# Patient Record
Sex: FEMALE | Race: BLACK OR AFRICAN AMERICAN | NOT HISPANIC OR LATINO | Employment: UNEMPLOYED | URBAN - METROPOLITAN AREA
[De-identification: names, ages, dates, MRNs, and addresses within clinical notes are randomized per-mention and may not be internally consistent; named-entity substitution may affect disease eponyms.]

---

## 2017-11-11 ENCOUNTER — HOSPITAL ENCOUNTER (EMERGENCY)
Facility: CLINIC | Age: 22
Discharge: HOME | End: 2017-11-11
Attending: EMERGENCY MEDICINE | Admitting: EMERGENCY MEDICINE

## 2017-11-11 VITALS
DIASTOLIC BLOOD PRESSURE: 57 MMHG | SYSTOLIC BLOOD PRESSURE: 115 MMHG | TEMPERATURE: 98.3 F | BODY MASS INDEX: 23.32 KG/M2 | HEIGHT: 65 IN | RESPIRATION RATE: 16 BRPM | WEIGHT: 140 LBS | HEART RATE: 71 BPM | OXYGEN SATURATION: 97 %

## 2017-11-11 DIAGNOSIS — R19.7 DIARRHEA, UNSPECIFIED TYPE: ICD-10-CM

## 2017-11-11 DIAGNOSIS — L73.1 INGROWN HAIR: ICD-10-CM

## 2017-11-11 PROCEDURE — 99283 EMERGENCY DEPT VISIT LOW MDM: CPT | Mod: 25

## 2017-11-11 PROCEDURE — 10060 I&D ABSCESS SIMPLE/SINGLE: CPT

## 2017-11-11 RX ORDER — LOPERAMIDE HYDROCHLORIDE 2 MG/1
2 TABLET ORAL 4 TIMES DAILY PRN
Qty: 8 TABLET | Refills: 0 | Status: SHIPPED | OUTPATIENT
Start: 2017-11-11 | End: 2020-10-06

## 2017-11-11 NOTE — ED AVS SNAPSHOT
Emergency Department    [address] [place] [address]    Phone:  [phone]    Fax:  [phone]                                       Hansa Ortiz   MRN: [id number]    Department:   Emergency Department   Date of Visit:  11/11/2017           After Visit Summary Signature Page     I have received my discharge instructions, and my questions have been answered. I have discussed any challenges I see with this plan with the nurse or doctor.    ..........................................................................................................................................  Patient/Patient Representative Signature      ..........................................................................................................................................  Patient Representative Print Name and Relationship to Patient    ..................................................               ................................................  Date                                            Time    ..........................................................................................................................................  Reviewed by Signature/Title    ...................................................              ..............................................  Date                                                            Time

## 2017-11-11 NOTE — ED PROVIDER NOTES
"  History     Chief Complaint:  \"I think I have an ingrown hair\"       HPI   Hansa Ortiz is a 22 year old female who presents for evaluation of the painful wound to her right groin. She denies a \"ingrown hair\" that is become painful with a small surrounding rash over the past 3 days. No prior similar problems. She is not diabetic and has had no fevers or purulent drainage, though she and her boyfriend had been picking at the area to see if it will drain. All they could get out was a scant amount of blood. She has no urinary symptoms and no symptoms down her leg.  She denies any possibility of pregnancy. She is single and reports concern for several loose nonbloody stools today, in the absence of recent antibiotic use or abdominal pain.    Allergies:    NKDA      Medications:    No regular medications      Past Medical History:    No past medical history on file.    There are no active problems to display for this patient.       Past Surgical History:    No past surgical history on file.     Family History:    family history is not on file.    Social History:    works at a local nursing home.      Review of Systems   All other systems reviewed and are negative.    Physical Exam     Patient Vitals for the past 24 hrs:   BP Temp Temp src Pulse Resp SpO2 Height Weight   11/11/17 1442 115/57 98.3  F (36.8  C) Oral 71 16 97 % 1.651 m (5' 5\") 63.5 kg (140 lb)        Physical Exam  General: nontoxic appearing woman sitting upright in room 27  HENT: mucous membranes moist   CV: regular rate, normal right femoral pulse is palpable and is distinct from the inguinal wound.  Resp: clear throughout, normal effort, no crackles or wheezing  GI: abdomen soft, nontender, normal bowel sounds  MSK: no bony tenderness, no CVAT  Skin: approximately 1 cm area of slight erythema at the right lateral edge of her pubic hair in the inguinal crease with a miniscule central opening through which only scant amount of blood can be drained.  No " fluctuance or purulence. No streaking erythema nor crepitus.  Female ED nurse Sheryl present as chaperone throughout the entire exam and procedure.  Neuro: alert, clear speech, oriented   Psych: normal mood and affect    Emergency Department Course   Procedures:  Procedure: Incision and Drainage   Performed by: Kevin Nunez MD    LOCATION:  R inguinal crease      ANESTHESIA:  Local field block using Marcaine 0.5% with epinephrine, total of 3 mLs    PREPARATION:  Cleansed with Betadine    PROCEDURE:  Area was incised with # 11 Blade (Sharp Point) with a Single Straight incision.  Wound treatment included removal of an embedded ingrown pubic hair, without purulence.  Wound gently bluntly explored.  Scant bleeding <1cc total.  No packing.  Appropriate dressing was applied to cover the area.    Patient Status:  Patient tolerated the procedure moderately well. There were no complications evident      Emergency Department Course:  Past medical records, nursing notes, and vitals reviewed.  I performed an exam of the patient and obtained history, as documented above.    Procedure performed as above.    I rechecked the patient. Findings and plan explained to the Patient. Patient was discharged home in improved condition.    Impression & Plan    Medical Decision Making:  She has some localized tenderness and findings on exam and procedure consistent with an ingrown hair. It is no convincing serious infection around this and I do not think she requires antibiotics. There is nothing to culture. Wound care was discussed. Regarding her diarrhea, I suspect a benign cause and there is no indication for emergent workup or immediate treatment, though I did discuss potential use of over-the-counter medications.  Return for acute worsening and otherwise follow up soon through clinic.      Diagnosis:    ICD-10-CM    1. Ingrown hair L73.1    2. Diarrhea, unspecified type R19.7         Discharge Medications:  Imodium          11/11/2017   No att. providers found        Kevin Nunez MD  11/11/17 1094

## 2017-11-11 NOTE — ED AVS SNAPSHOT
Emergency Department    [address] [facility] [address]    Phone:  [phone]    Fax:  [phone]                                       Hansa Ortiz   MRN: [id number]    Department:   Emergency Department   Date of Visit:  11/11/2017           Patient Information     Date Of Birth          1995        Your diagnoses for this visit were:     Ingrown hair     Diarrhea, unspecified type        You were seen by Kevin Nunez MD.      Follow-up Information     Follow up with [facility]. Schedule an appointment as soon as possible for a visit in 2 days.    Specialties:  Podiatry, Internal Medicine, Family Medicine    Why:  As needed    Contact information:    [address] [address] [phone]        Follow up with  Emergency Department.    Specialty:  EMERGENCY MEDICINE    Why:  As needed, If symptoms worsen    Contact information:    [address] [facility] [address] [phone]        Discharge Instructions       Keep your groin wound clean and dry for the next few days, though it is ok to bathe as you normally would.  No antibiotics are necessary at this time.  Regarding your diarrhea, it would be reasonable to take Imodium or Pepto Bismol (both available over the counter).  Be sure to drink plenty of fluids to stay well hydrated.    Discharge References/Attachments     DIARRHEA, TREATING (ENGLISH)      24 Hour Appointment Hotline       To make an appointment at any [facility], call [phone] ([phone]). If you don't have a family doctor or clinic, we will help you find one. [facility] are conveniently located to serve the needs of you and your family.             Review of your medicines      Notice     You have not been prescribed any medications.            Orders Needing Specimen Collection     None      Pending Results     No orders found from 11/9/2017 to 11/12/2017.             Pending Culture Results     No orders found from 11/9/2017 to 11/12/2017.            Pending Results Instructions     If you had any lab results that were not finalized at the time of your Discharge, you can call the ED Lab Result RN at [phone]. You will be contacted by this team for any positive Lab results or changes in treatment. The nurses are available 7 days a week from 10A to 6:30P.  You can leave a message 24 hours per day and they will return your call.        Test Results From Your Hospital Stay               Clinical Quality Measure: Blood Pressure Screening     Your blood pressure was checked while you were in the emergency department today. The last reading we obtained was  BP: 115/57 . Please read the guidelines below about what these numbers mean and what you should do about them.  If your systolic blood pressure (the top number) is less than 120 and your diastolic blood pressure (the bottom number) is less than 80, then your blood pressure is normal. There is nothing more that you need to do about it.  If your systolic blood pressure (the top number) is 120-139 or your diastolic blood pressure (the bottom number) is 80-89, your blood pressure may be higher than it should be. You should have your blood pressure rechecked within a year by a primary care provider.  If your systolic blood pressure (the top number) is 140 or greater or your diastolic blood pressure (the bottom number) is 90 or greater, you may have high blood pressure. High blood pressure is treatable, but if left untreated over time it can put you at risk for heart attack, stroke, or kidney failure. You should have your blood pressure rechecked by a primary care provider within the next 4 weeks.  If your provider in the emergency department today gave you specific instructions to follow-up with your doctor or provider even sooner than that, you should follow that instruction and not wait for up to 4 weeks for your follow-up  "visit.        Thank you for choosing [facility]       Thank you for choosing [facility] for your care. Our goal is always to provide you with excellent care. Hearing back from our patients is one way we can continue to improve our services. Please take a few minutes to complete the written survey that you may receive in the mail after you visit with us. Thank you!        [employer] Information     "[employer] lets you send messages to your doctor, view your test results, renew your prescriptions, schedule appointments and more. To sign up, go to [email or web address] . Click on \"Log in\" on the left side of the screen, which will take you to the Welcome page. Then click on \"Sign up Now\" on the right side of the page.     You will be asked to enter the access code listed below, as well as some personal information. Please follow the directions to create your username and password.     Your access code is: [email or web address]  Expires: 2018  3:41 PM     Your access code will  in 90 days. If you need help or a new code, please call your [facility] clinic or [phone].        Care EveryWhere ID     This is your Care EveryWhere ID. This could be used by other organizations to access your [facility] medical records  [id number]        Equal Access to Services     DAWNA BROWN : Hadii denny asencioo Soeva, waaxda luqadaha, qaybta kaalmada adeegyada, francesca manning. So [facility] [phone].    ATENCIÓN: Si habla español, tiene a mims disposición servicios gratuitos de asistencia lingüística. Llame al [phone].    We comply with applicable federal civil rights laws and [place] laws. We do not discriminate on the basis of race, color, national origin, age, disability, sex, sexual orientation, or gender identity.            After Visit Summary       This is your record. Keep this with you and show to your community pharmacist(s) and doctor(s) at your next visit.                  "

## 2017-11-11 NOTE — LETTER
November 11, 2017      To Whom It May Concern:      Hansa Ortiz was seen in our Emergency Department today, 11/11/17.  I expect her condition to improve over the next 2 days.  She may return to work when improved.        Sincerely,        Kevin Nunez MD

## 2020-01-14 LAB
HPV ABSTRACT: NORMAL
PAP-ABSTRACT: NORMAL
PAP: NORMAL

## 2020-05-09 ENCOUNTER — APPOINTMENT (OUTPATIENT)
Dept: ULTRASOUND IMAGING | Facility: CLINIC | Age: 25
End: 2020-05-09
Attending: PHYSICIAN ASSISTANT
Payer: COMMERCIAL

## 2020-05-09 ENCOUNTER — HOSPITAL ENCOUNTER (EMERGENCY)
Facility: CLINIC | Age: 25
Discharge: HOME | End: 2020-05-09
Attending: PHYSICIAN ASSISTANT | Admitting: PHYSICIAN ASSISTANT
Payer: COMMERCIAL

## 2020-05-09 VITALS
SYSTOLIC BLOOD PRESSURE: 104 MMHG | RESPIRATION RATE: 12 BRPM | OXYGEN SATURATION: 100 % | TEMPERATURE: 97.6 F | DIASTOLIC BLOOD PRESSURE: 80 MMHG | HEART RATE: 59 BPM

## 2020-05-09 DIAGNOSIS — R11.2 NAUSEA VOMITING AND DIARRHEA: ICD-10-CM

## 2020-05-09 DIAGNOSIS — R10.11 RUQ ABDOMINAL PAIN: ICD-10-CM

## 2020-05-09 DIAGNOSIS — R19.7 NAUSEA VOMITING AND DIARRHEA: ICD-10-CM

## 2020-05-09 LAB
ALBUMIN SERPL-MCNC: 4 G/DL (ref 3.4–5)
ALBUMIN UR-MCNC: 20 MG/DL
ALP SERPL-CCNC: 49 U/L (ref 40–150)
ALT SERPL W P-5'-P-CCNC: 45 U/L (ref 0–50)
AMORPH CRY #/AREA URNS HPF: ABNORMAL /HPF
ANION GAP SERPL CALCULATED.3IONS-SCNC: 7 MMOL/L (ref 3–14)
APPEARANCE UR: ABNORMAL
AST SERPL W P-5'-P-CCNC: 29 U/L (ref 0–45)
B-HCG FREE SERPL-ACNC: <5 IU/L
BASOPHILS # BLD AUTO: 0 10E9/L (ref 0–0.2)
BASOPHILS NFR BLD AUTO: 0.4 %
BILIRUB SERPL-MCNC: 0.6 MG/DL (ref 0.2–1.3)
BILIRUB UR QL STRIP: NEGATIVE
BUN SERPL-MCNC: 13 MG/DL (ref 7–30)
CALCIUM SERPL-MCNC: 8.4 MG/DL (ref 8.5–10.1)
CHLORIDE SERPL-SCNC: 109 MMOL/L (ref 94–109)
CO2 SERPL-SCNC: 23 MMOL/L (ref 20–32)
COLOR UR AUTO: YELLOW
CREAT SERPL-MCNC: 0.59 MG/DL (ref 0.52–1.04)
DIFFERENTIAL METHOD BLD: NORMAL
EOSINOPHIL # BLD AUTO: 0 10E9/L (ref 0–0.7)
EOSINOPHIL NFR BLD AUTO: 0.1 %
ERYTHROCYTE [DISTWIDTH] IN BLOOD BY AUTOMATED COUNT: 12.7 % (ref 10–15)
GFR SERPL CREATININE-BSD FRML MDRD: >90 ML/MIN/{1.73_M2}
GLUCOSE SERPL-MCNC: 123 MG/DL (ref 70–99)
GLUCOSE UR STRIP-MCNC: NEGATIVE MG/DL
HCT VFR BLD AUTO: 37.6 % (ref 35–47)
HGB BLD-MCNC: 12.2 G/DL (ref 11.7–15.7)
HGB UR QL STRIP: ABNORMAL
IMM GRANULOCYTES # BLD: 0.1 10E9/L (ref 0–0.4)
IMM GRANULOCYTES NFR BLD: 1.1 %
KETONES UR STRIP-MCNC: 10 MG/DL
LEUKOCYTE ESTERASE UR QL STRIP: NEGATIVE
LIPASE SERPL-CCNC: 48 U/L (ref 73–393)
LYMPHOCYTES # BLD AUTO: 1.6 10E9/L (ref 0.8–5.3)
LYMPHOCYTES NFR BLD AUTO: 21.8 %
MCH RBC QN AUTO: 31.4 PG (ref 26.5–33)
MCHC RBC AUTO-ENTMCNC: 32.4 G/DL (ref 31.5–36.5)
MCV RBC AUTO: 97 FL (ref 78–100)
MONOCYTES # BLD AUTO: 0.3 10E9/L (ref 0–1.3)
MONOCYTES NFR BLD AUTO: 3.9 %
MUCOUS THREADS #/AREA URNS LPF: PRESENT /LPF
NEUTROPHILS # BLD AUTO: 5.2 10E9/L (ref 1.6–8.3)
NEUTROPHILS NFR BLD AUTO: 72.7 %
NITRATE UR QL: NEGATIVE
NRBC # BLD AUTO: 0 10*3/UL
NRBC BLD AUTO-RTO: 0 /100
PH UR STRIP: 7.5 PH (ref 5–7)
PLATELET # BLD AUTO: 299 10E9/L (ref 150–450)
POTASSIUM SERPL-SCNC: 3.4 MMOL/L (ref 3.4–5.3)
PROT SERPL-MCNC: 8.2 G/DL (ref 6.8–8.8)
RBC # BLD AUTO: 3.89 10E12/L (ref 3.8–5.2)
RBC #/AREA URNS AUTO: 22 /HPF (ref 0–2)
SODIUM SERPL-SCNC: 139 MMOL/L (ref 133–144)
SOURCE: ABNORMAL
SP GR UR STRIP: 1.02 (ref 1–1.03)
SQUAMOUS #/AREA URNS AUTO: 12 /HPF (ref 0–1)
UROBILINOGEN UR STRIP-MCNC: NORMAL MG/DL (ref 0–2)
WBC # BLD AUTO: 7.2 10E9/L (ref 4–11)
WBC #/AREA URNS AUTO: 1 /HPF (ref 0–5)

## 2020-05-09 PROCEDURE — 25800030 ZZH RX IP 258 OP 636: Performed by: PHYSICIAN ASSISTANT

## 2020-05-09 PROCEDURE — 81001 URINALYSIS AUTO W/SCOPE: CPT | Performed by: PHYSICIAN ASSISTANT

## 2020-05-09 PROCEDURE — 96375 TX/PRO/DX INJ NEW DRUG ADDON: CPT

## 2020-05-09 PROCEDURE — 25000128 H RX IP 250 OP 636: Performed by: PHYSICIAN ASSISTANT

## 2020-05-09 PROCEDURE — 76705 ECHO EXAM OF ABDOMEN: CPT

## 2020-05-09 PROCEDURE — 84702 CHORIONIC GONADOTROPIN TEST: CPT

## 2020-05-09 PROCEDURE — 85025 COMPLETE CBC W/AUTO DIFF WBC: CPT | Performed by: PHYSICIAN ASSISTANT

## 2020-05-09 PROCEDURE — 96374 THER/PROPH/DIAG INJ IV PUSH: CPT

## 2020-05-09 PROCEDURE — 99285 EMERGENCY DEPT VISIT HI MDM: CPT | Mod: 25

## 2020-05-09 PROCEDURE — 96376 TX/PRO/DX INJ SAME DRUG ADON: CPT

## 2020-05-09 PROCEDURE — 83690 ASSAY OF LIPASE: CPT | Performed by: PHYSICIAN ASSISTANT

## 2020-05-09 PROCEDURE — 80053 COMPREHEN METABOLIC PANEL: CPT | Performed by: PHYSICIAN ASSISTANT

## 2020-05-09 PROCEDURE — 96361 HYDRATE IV INFUSION ADD-ON: CPT

## 2020-05-09 RX ORDER — SODIUM CHLORIDE 9 MG/ML
1000 INJECTION, SOLUTION INTRAVENOUS CONTINUOUS
Status: DISCONTINUED | OUTPATIENT
Start: 2020-05-09 | End: 2020-05-09 | Stop reason: HOSPADM

## 2020-05-09 RX ORDER — ONDANSETRON 2 MG/ML
4 INJECTION INTRAMUSCULAR; INTRAVENOUS EVERY 30 MIN PRN
Status: DISCONTINUED | OUTPATIENT
Start: 2020-05-09 | End: 2020-05-09 | Stop reason: HOSPADM

## 2020-05-09 RX ORDER — HYDROMORPHONE HYDROCHLORIDE 1 MG/ML
0.5 INJECTION, SOLUTION INTRAMUSCULAR; INTRAVENOUS; SUBCUTANEOUS
Status: COMPLETED | OUTPATIENT
Start: 2020-05-09 | End: 2020-05-09

## 2020-05-09 RX ORDER — OXYCODONE HYDROCHLORIDE 5 MG/1
5 TABLET ORAL EVERY 6 HOURS PRN
Qty: 12 TABLET | Refills: 0 | Status: SHIPPED | OUTPATIENT
Start: 2020-05-09 | End: 2020-10-06

## 2020-05-09 RX ORDER — ONDANSETRON 4 MG/1
4 TABLET, ORALLY DISINTEGRATING ORAL EVERY 8 HOURS PRN
Qty: 10 TABLET | Refills: 0 | Status: SHIPPED | OUTPATIENT
Start: 2020-05-09 | End: 2020-05-12

## 2020-05-09 RX ADMIN — HYDROMORPHONE HYDROCHLORIDE 0.5 MG: 1 INJECTION, SOLUTION INTRAMUSCULAR; INTRAVENOUS; SUBCUTANEOUS at 13:32

## 2020-05-09 RX ADMIN — SODIUM CHLORIDE 1000 ML: 9 INJECTION, SOLUTION INTRAVENOUS at 12:13

## 2020-05-09 RX ADMIN — ONDANSETRON 4 MG: 2 INJECTION INTRAMUSCULAR; INTRAVENOUS at 13:32

## 2020-05-09 RX ADMIN — HYDROMORPHONE HYDROCHLORIDE 0.5 MG: 1 INJECTION, SOLUTION INTRAMUSCULAR; INTRAVENOUS; SUBCUTANEOUS at 12:23

## 2020-05-09 RX ADMIN — ONDANSETRON 4 MG: 2 INJECTION INTRAMUSCULAR; INTRAVENOUS at 12:13

## 2020-05-09 ASSESSMENT — ENCOUNTER SYMPTOMS
VOMITING: 1
CHILLS: 1
ABDOMINAL PAIN: 1
FEVER: 0
NAUSEA: 1
HEADACHES: 0
DIARRHEA: 1

## 2020-05-09 NOTE — ED PROVIDER NOTES
History     Chief Complaint:  Nausea & Vomiting      HPI   Hansa Ortiz is a 24 year old female who presents via EMS with nausea and vomiting. Patient reports onset of upper abdominal pain preceding nausea and vomiting which began at 0700 today. She has also had three episodes of loose stools today. She also has chills. Hansa notes that she has not eaten abnormal food recently -- she did make herself chicken for dinner last night. She has been unable to tolerate food today. Patient has not taken any antiemetics. LMP was one week ago and this was normal for her. She denies fever, urinary symptoms, abnormal vaginal discharge, abnormal vaginal bleeding, and headache. However, she does confirm chance of pregnancy. She denies ill contacts.     Allergies:  No Known Drug Allergies     Medications:    The patient is currently on no regular medications.     Past Medical History:    Moderate cervical dysplasia   Irregular menstrual cycle   Varicella   STD   Situational stress     Past Surgical History:    LEEP     Family History:    Father - hypertension   Mother - DVT     Social History:  The patient was alone.  Smoking Status: Never  Smokeless Tobacco: Never  Alcohol Use: Not currently    Marital Status:  Single      Review of Systems   Constitutional: Positive for chills. Negative for fever.   Gastrointestinal: Positive for abdominal pain, diarrhea, nausea and vomiting.   Genitourinary: Negative.  Negative for menstrual problem, vaginal bleeding and vaginal discharge.   Neurological: Negative for headaches.   All other systems reviewed and are negative.      Physical Exam     Patient Vitals for the past 24 hrs:   BP Temp Temp src Pulse Heart Rate Resp SpO2   05/09/20 1137 (!) 147/91 97.6  F (36.4  C) Oral (!) 47 (!) 47 12 100 %       Physical Exam  Constitutional: Pleasant. Cooperative.   Eyes: Pupils equally round and reactive  HENT: Head is normal in appearance. Oropharynx is normal with moist mucus  membranes.  Cardiovascular: Regular rate and rhythm and without murmurs.  Respiratory: Normal respiratory effort, lungs are clear bilaterally.  GI: Diffuse TTP, however worse in RUQ. Soft, non-distended. No guarding, rebound, or rigidity.  Musculoskeletal: No asymmetry of the lower extremities, no tenderness to palpation.   Skin: Normal, without rash.  Neurologic: Cranial nerves grossly intact, normal cognition, no focal deficits. Alert and oriented x 3.   Psychiatric: Normal affect.  Nursing notes and vital signs reviewed.    Emergency Department Course     Imaging:  Radiology findings were communicated with the patient who voiced understanding of the findings.    US Abdomen Limited (RUQ)  Final Result  IMPRESSION:  1.  Normal right upper quadrant ultrasound  RAFA POOL MD    Laboratory:  Laboratory findings were communicated with the patient who voiced understanding of the findings.    CBC: AWNL. (WBC 7.2, HGB 12.2, )   CMP: Glucose 123 (H), Calcium 8.4 (L) o/w WNL (Creatinine 0.59)     Lipase: 48 (L)    ISTAT HCG Quantitative Pregnancy POCT: <5.0     UA: Yellow, slightly cloudy urine. Ketones urine 10, blood urine moderate, pH urine moderate, protein albumin urine 20, RBC urine 22 (H), Squamous epithelial/HPF urine 12 (H), mucous urine present, amorphous crystals few o/w WNL    Interventions:  Medications   0.9% sodium chloride BOLUS (0 mLs Intravenous Stopped 5/9/20 1520)     Followed by   sodium chloride 0.9% infusion (has no administration in time range)   ondansetron (ZOFRAN) injection 4 mg (4 mg Intravenous Given 5/9/20 1332)   HYDROmorphone (PF) (DILAUDID) injection 0.5 mg (0.5 mg Intravenous Given 5/9/20 1223)   HYDROmorphone (PF) (DILAUDID) injection 0.5 mg (0.5 mg Intravenous Given 5/9/20 1332)       Emergency Department Course:  Past medical records, nursing notes, and vitals reviewed.    1140 I performed an exam of the patient as documented above.     IV was inserted and blood was drawn for  laboratory testing, results above.  The patient provided a urine sample here in the emergency department. This was sent for laboratory testing, findings above.  The patient was sent for a US Abdomen Limited (RUQ) while in the emergency department, results above.     I rechecked the patient and discussed the results of her workup thus far.     Patient passed her PO challenge.     Findings and plan explained to the Patient. Patient discharged home with instructions regarding supportive care, medications, and reasons to return. The importance of close follow-up was reviewed. The patient was prescribed Zofran and Oxycodone.     I personally reviewed the laboratory and imaging results with the Patient and answered all related questions prior to discharge.     Impression & Plan     Medical Decision Making:  Hansa Ortiz is a 24 year old female who presents to the ED for evaluation of abdominal pain and nausea and vomiting.  Patient had sudden onset this a.m. with associated diarrhea.  See HPI as above for additional details.  Vitals and physical exam as above.  Differential is broad and included gallbladder etiology, gastroenteritis, SBO, GERD, hepatitis, pancreatitis, PUD, among others.  The above work-up was obtained.  No evidence for gallbladder pathology.  No evidence for pancreatitis or hepatitis either.  Patient reports complete resolution of symptoms following Zofran.  Low suspicion for SBO and symptoms are not consistent with GERD or PUD at this time.  Suspect her symptoms are secondary to gastroenteritis at this time.  She reported improvement of her symptoms following fluids and pain medication.  Will discharge her home with Zofran as well as a very short course of oral narcotics.  Discussed narcotic precautions. Discussed reasons to return. All questions answered. Patient discharged to home in stable condition.    Diagnosis:    ICD-10-CM    1. RUQ abdominal pain  R10.11 UA with Microscopic   2. Nausea  vomiting and diarrhea  R11.2     R19.7        Disposition:  Discharged to home.    Discharge Medications:  New Prescriptions    ONDANSETRON (ZOFRAN ODT) 4 MG ODT TAB    Take 1 tablet (4 mg) by mouth every 8 hours as needed for nausea    OXYCODONE (ROXICODONE) 5 MG TABLET    Take 1 tablet (5 mg) by mouth every 6 hours as needed for pain       Scribe Disclosure:  I, Johana Ortega, am serving as a scribe at 11:36 AM on 5/9/2020 to document services personally performed by Stefano Flannery PA-C based on my observations and the provider's statements to me.   5/9/2020   [facility] EMERGENCY DEPARTMENT       Stefano Flannery PA-C  05/09/20 1600

## 2020-05-09 NOTE — ED TRIAGE NOTES
Patient arrived at around 11:30 complaining of nausea and vomiting that started this morning. Denies fevers. Reports some chills that started at the same time as other symptoms. Patient reports lower mid abdominal pain described as an aching. Reports some diarrhea today with 3 episodes of loose stools. ABCs intact. Alert and oriented X4. Oriented to room and call light. Patient educated about hand hygiene practices.

## 2020-05-09 NOTE — ED NOTES
Bed: ED12  Expected date: 5/9/20  Expected time: 11:18 AM  Means of arrival: Ambulance  Comments:  ALLY 24f n/v

## 2020-05-09 NOTE — ED AVS SNAPSHOT
[facility] Emergency Department  [address]  [facility] [address]  Phone:  [phone]  Fax:  [phone]                                    Hansa Ortiz   MRN: [id number]    Department:  [facility] Emergency Department   Date of Visit:  5/9/2020           After Visit Summary Signature Page    I have received my discharge instructions, and my questions have been answered. I have discussed any challenges I see with this plan with the nurse or doctor.    ..........................................................................................................................................  Patient/Patient Representative Signature      ..........................................................................................................................................  Patient Representative Print Name and Relationship to Patient    ..................................................               ................................................  Date                                   Time    ..........................................................................................................................................  Reviewed by Signature/Title    ...................................................              ..............................................  Date                                               Time          22EPIC Rev 08/18

## 2020-08-25 ENCOUNTER — TRANSFERRED RECORDS (OUTPATIENT)
Dept: MULTI SPECIALTY CLINIC | Facility: CLINIC | Age: 25
End: 2020-08-25

## 2020-08-25 LAB
ABO + RH BLD: NORMAL
ABO + RH BLD: NORMAL
C TRACH DNA SPEC QL PROBE+SIG AMP: NEGATIVE
HBA1C MFR BLD: 5.2 % (ref 0–5.7)
HBV SURFACE AG SERPL QL IA: NEGATIVE
HIV 1+2 AB+HIV1 P24 AG SERPL QL IA: NEGATIVE
N GONORRHOEA DNA SPEC QL PROBE+SIG AMP: NEGATIVE
RUBELLA ABY IGG: NORMAL
TREPONEMA ANTIBODIES: NEGATIVE

## 2020-10-06 ENCOUNTER — PRENATAL OFFICE VISIT (OUTPATIENT)
Dept: NURSING | Facility: CLINIC | Age: 25
End: 2020-10-06
Payer: COMMERCIAL

## 2020-10-06 DIAGNOSIS — O09.91 SUPERVISION OF HIGH RISK PREGNANCY IN FIRST TRIMESTER: ICD-10-CM

## 2020-10-06 PROCEDURE — 99207 PR NO CHARGE NURSE ONLY: CPT

## 2020-10-06 RX ORDER — VITAMIN A ACETATE, .BETA.-CAROTENE, ASCORBIC ACID, CHOLECALCIFEROL, .ALPHA.-TOCOPHEROL ACETATE, DL-, THIAMINE MONONITRATE, RIBOFLAVIN, NIACINAMIDE, PYRIDOXINE HYDROCHLORIDE, FOLIC ACID, CYANOCOBALAMIN, CALCIUM CARBONATE, FERROUS FUMARATE, ZINC OXIDE, AND CUPRIC OXIDE 2000; 2000; 120; 400; 22; 1.84; 3; 20; 10; 1; 12; 200; 27; 25; 2 [IU]/1; [IU]/1; MG/1; [IU]/1; MG/1; MG/1; MG/1; MG/1; MG/1; MG/1; UG/1; MG/1; MG/1; MG/1; MG/1
1 TABLET ORAL DAILY
COMMUNITY
End: 2020-12-01

## 2020-10-06 ASSESSMENT — ANXIETY QUESTIONNAIRES
IF YOU CHECKED OFF ANY PROBLEMS ON THIS QUESTIONNAIRE, HOW DIFFICULT HAVE THESE PROBLEMS MADE IT FOR YOU TO DO YOUR WORK, TAKE CARE OF THINGS AT HOME, OR GET ALONG WITH OTHER PEOPLE: SOMEWHAT DIFFICULT
7. FEELING AFRAID AS IF SOMETHING AWFUL MIGHT HAPPEN: NOT AT ALL
1. FEELING NERVOUS, ANXIOUS, OR ON EDGE: SEVERAL DAYS
2. NOT BEING ABLE TO STOP OR CONTROL WORRYING: SEVERAL DAYS
5. BEING SO RESTLESS THAT IT IS HARD TO SIT STILL: NOT AT ALL
3. WORRYING TOO MUCH ABOUT DIFFERENT THINGS: SEVERAL DAYS
GAD7 TOTAL SCORE: 4
6. BECOMING EASILY ANNOYED OR IRRITABLE: SEVERAL DAYS

## 2020-10-06 ASSESSMENT — PATIENT HEALTH QUESTIONNAIRE - PHQ9
5. POOR APPETITE OR OVEREATING: NOT AT ALL
SUM OF ALL RESPONSES TO PHQ QUESTIONS 1-9: 4

## 2020-10-06 NOTE — Clinical Note
FYI mainly, but I am unsure which PNV I should send to pharmacy as there are so many.  [facility] [place] on [address]

## 2020-10-07 ASSESSMENT — ANXIETY QUESTIONNAIRES: GAD7 TOTAL SCORE: 4

## 2020-10-13 ENCOUNTER — PRENATAL OFFICE VISIT (OUTPATIENT)
Dept: OBGYN | Facility: CLINIC | Age: 25
End: 2020-10-13
Payer: COMMERCIAL

## 2020-10-13 VITALS — DIASTOLIC BLOOD PRESSURE: 64 MMHG | BODY MASS INDEX: 24.96 KG/M2 | WEIGHT: 150 LBS | SYSTOLIC BLOOD PRESSURE: 104 MMHG

## 2020-10-13 DIAGNOSIS — Z34.80 SUPERVISION OF OTHER NORMAL PREGNANCY: Primary | ICD-10-CM

## 2020-10-13 PROCEDURE — 99000 SPECIMEN HANDLING OFFICE-LAB: CPT | Performed by: OBSTETRICS & GYNECOLOGY

## 2020-10-13 PROCEDURE — 99207 PR PRENATAL VISIT: CPT | Performed by: OBSTETRICS & GYNECOLOGY

## 2020-10-13 PROCEDURE — 83021 HEMOGLOBIN CHROMOTOGRAPHY: CPT | Mod: 90 | Performed by: OBSTETRICS & GYNECOLOGY

## 2020-10-13 RX ORDER — PRENATAL VIT/IRON FUM/FOLIC AC 27MG-0.8MG
1 TABLET ORAL DAILY
Qty: 90 TABLET | Refills: 3 | Status: SHIPPED | OUTPATIENT
Start: 2020-10-13 | End: 2021-05-25

## 2020-10-14 LAB
HGB A1 MFR BLD: 96.8 % (ref 95–97.9)
HGB A2 MFR BLD: 2.8 % (ref 2–3.5)
HGB C MFR BLD: 0 % (ref 0–0)
HGB E MFR BLD: 0 % (ref 0–0)
HGB F MFR BLD: 0.4 % (ref 0–2.1)
HGB FRACT BLD ELPH-IMP: NORMAL
HGB OTHER MFR BLD: 0 % (ref 0–0)
HGB S BLD QL SOLY: NORMAL
HGB S MFR BLD: 0 % (ref 0–0)
PATH INTERP BLD-IMP: NORMAL

## 2020-10-22 ENCOUNTER — TELEPHONE (OUTPATIENT)
Dept: OBGYN | Facility: CLINIC | Age: 25
End: 2020-10-22

## 2020-10-22 NOTE — TELEPHONE ENCOUNTER
"Attempted to call pt to discuss unemployment appeal and why the need for the letter - not \"just cannot work\"/background.    Pt did not answer phone and mailbox is full.    Will routed to Dr Noel to see if she is aware of situation and willing to write a letter of appeal.    Maribel Arriaga RN on 10/22/2020 at 1:23 PM    "

## 2020-10-22 NOTE — TELEPHONE ENCOUNTER
Patient is asking for a note for her unemployment appeal stating that she is unable to work.  Please forward this on to Dr. Noel.  Patient specified it is just a note from the doctor, there is no specific form to fill out.    Thank you

## 2020-10-23 NOTE — TELEPHONE ENCOUNTER
Discussed with patient 's recommendations below. Patient states she has a hearing appt for unemployment on 11/8/2020. Discussed should make an office visit sooner to discuss this. reiterated 's instructions below.  States she is not specifically looking to file unemployment for pregnancy or health related however then said she was.discussed needs an appointment.should talk to PCP if not related to pregnancy, however restated again 's instructions.      Marie Jacobo RN on 10/23/2020 at 11:19 AM

## 2020-10-23 NOTE — TELEPHONE ENCOUNTER
No I do not. It did not come up at her visit. I will wait to discuss this with her in person because pregnancy on it's own does not qualify one for unemployment

## 2020-11-18 ENCOUNTER — NURSE TRIAGE (OUTPATIENT)
Dept: NURSING | Facility: CLINIC | Age: 25
End: 2020-11-18
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
## 2020-12-01 ENCOUNTER — PRENATAL OFFICE VISIT (OUTPATIENT)
Dept: OBGYN | Facility: CLINIC | Age: 25
End: 2020-12-01
Attending: OBSTETRICS & GYNECOLOGY
Payer: COMMERCIAL

## 2020-12-01 ENCOUNTER — ANCILLARY PROCEDURE (OUTPATIENT)
Dept: ULTRASOUND IMAGING | Facility: CLINIC | Age: 25
End: 2020-12-01
Attending: OBSTETRICS & GYNECOLOGY
Payer: COMMERCIAL

## 2020-12-01 VITALS — WEIGHT: 157.4 LBS | BODY MASS INDEX: 26.19 KG/M2 | DIASTOLIC BLOOD PRESSURE: 62 MMHG | SYSTOLIC BLOOD PRESSURE: 120 MMHG

## 2020-12-01 DIAGNOSIS — Z34.80 SUPERVISION OF OTHER NORMAL PREGNANCY: ICD-10-CM

## 2020-12-01 DIAGNOSIS — O09.92 SUPERVISION OF HIGH RISK PREGNANCY IN SECOND TRIMESTER: Primary | ICD-10-CM

## 2020-12-01 DIAGNOSIS — Z36.9 ENCOUNTER FOR ANTENATAL SCREENING: ICD-10-CM

## 2020-12-01 PROCEDURE — 76805 OB US >/= 14 WKS SNGL FETUS: CPT | Performed by: OBSTETRICS & GYNECOLOGY

## 2020-12-01 PROCEDURE — 81511 FTL CGEN ABNOR FOUR ANAL: CPT | Mod: 90 | Performed by: OBSTETRICS & GYNECOLOGY

## 2020-12-01 PROCEDURE — 99207 PR PRENATAL VISIT: CPT | Performed by: OBSTETRICS & GYNECOLOGY

## 2020-12-01 PROCEDURE — 99000 SPECIMEN HANDLING OFFICE-LAB: CPT | Performed by: OBSTETRICS & GYNECOLOGY

## 2020-12-01 NOTE — LETTER
December 1, 2020      Hansa Ortiz  [address]   [facility] [address]        To Whom It May Concern:    Hansa Ortiz was seen in our clinic for prenatal care, her estimated due date is 4/7/2021. She is physically able to work during this pregnancy. With the following restrictions of not having to lift more than 25 pounds to prevent lower back injury during her pregnancy. Preferably, we would also like to limit her exposure to people that may have COVID in any form of employment during her pregnancy.       Sincerely,        Rayna Noel MD

## 2020-12-01 NOTE — PROGRESS NOTES
Prenatal Visit: doing well. Would like a letter stating that she is physically able to work as when she initially filled out the form for unemployment benefits she had incorrectly stated that she was not able to work.  Her anatomic survey was done today  She desires some form of aneuploidy screening. After discussing the different options she would like to proceed with the quad screen.  RTC in 4 weeks  Rayna Noel MD

## 2020-12-05 LAB
# FETUSES US: NORMAL
# FETUSES: 1
AFP ADJ MOM AMN: 0.54
AFP SERPL-MCNC: 44 NG/ML
AGE - REPORTED: 27.7 YR
CURRENT SMOKER: NO
CURRENT SMOKER: NO
DIABETES STATUS PATIENT: NO
FAMILY MEMBER DISEASES HX: NO
FAMILY MEMBER DISEASES HX: NO
GA METHOD: NORMAL
GA METHOD: NORMAL
GA: NORMAL WK
HCG MOM SERPL: 0.49
HCG SERPL-ACNC: NORMAL IU/L
HX OF HEREDITARY DISORDERS: NO
IDDM PATIENT QL: NO
INHIBIN A MOM SERPL: 0.49
INHIBIN A SERPL-MCNC: 89 PG/ML
INTEGRATED SCN PATIENT-IMP: NORMAL
IVF PREGNANCY: NORMAL
LMP START DATE: NORMAL
MONOCHORIONIC TWINS: NO
PATHOLOGY STUDY: NORMAL
PREV FETUS DEFECT: NO
SERVICE CMNT-IMP: NO
SPECIMEN DRAWN SERPL: NORMAL
U ESTRIOL MOM SERPL: 0.7
U ESTRIOL SERPL-MCNC: 2.41 NG/ML
VALPROIC/CARBAMAZEPINE STATUS: NO
WEIGHT UNITS: NORMAL

## 2021-01-13 DIAGNOSIS — Z23 NEED FOR TDAP VACCINATION: ICD-10-CM

## 2021-01-13 DIAGNOSIS — Z36.9 ENCOUNTER FOR ANTENATAL SCREENING OF MOTHER: Primary | ICD-10-CM

## 2021-01-22 ENCOUNTER — PRENATAL OFFICE VISIT (OUTPATIENT)
Dept: OBGYN | Facility: CLINIC | Age: 26
End: 2021-01-22
Payer: COMMERCIAL

## 2021-01-22 ENCOUNTER — NURSE TRIAGE (OUTPATIENT)
Dept: NURSING | Facility: CLINIC | Age: 26
End: 2021-01-22

## 2021-01-22 VITALS — SYSTOLIC BLOOD PRESSURE: 116 MMHG | BODY MASS INDEX: 28.72 KG/M2 | DIASTOLIC BLOOD PRESSURE: 62 MMHG | WEIGHT: 172.6 LBS

## 2021-01-22 DIAGNOSIS — Z23 NEED FOR TDAP VACCINATION: ICD-10-CM

## 2021-01-22 DIAGNOSIS — D64.9 LOW HEMOGLOBIN: Primary | ICD-10-CM

## 2021-01-22 DIAGNOSIS — O09.93 SUPERVISION OF HIGH RISK PREGNANCY IN THIRD TRIMESTER: Primary | ICD-10-CM

## 2021-01-22 DIAGNOSIS — Z36.9 ENCOUNTER FOR ANTENATAL SCREENING OF MOTHER: ICD-10-CM

## 2021-01-22 DIAGNOSIS — O09.93 SUPERVISION OF HIGH RISK PREGNANCY IN THIRD TRIMESTER: ICD-10-CM

## 2021-01-22 PROBLEM — O09.90 PREGNANCY, SUPERVISION, HIGH-RISK: Status: ACTIVE | Noted: 2020-10-06

## 2021-01-22 LAB
ERYTHROCYTE [DISTWIDTH] IN BLOOD BY AUTOMATED COUNT: 13 % (ref 10–15)
GLUCOSE 1H P 50 G GLC PO SERPL-MCNC: 116 MG/DL (ref 60–129)
HCT VFR BLD AUTO: 33.2 % (ref 35–47)
HGB BLD-MCNC: 11 G/DL (ref 11.7–15.7)
MCH RBC QN AUTO: 30.6 PG (ref 26.5–33)
MCHC RBC AUTO-ENTMCNC: 33.1 G/DL (ref 31.5–36.5)
MCV RBC AUTO: 93 FL (ref 78–100)
PLATELET # BLD AUTO: 270 10E9/L (ref 150–450)
RBC # BLD AUTO: 3.59 10E12/L (ref 3.8–5.2)
WBC # BLD AUTO: 8.7 10E9/L (ref 4–11)

## 2021-01-22 PROCEDURE — 99207 PR PRENATAL VISIT: CPT | Performed by: OBSTETRICS & GYNECOLOGY

## 2021-01-22 PROCEDURE — 85027 COMPLETE CBC AUTOMATED: CPT | Performed by: OBSTETRICS & GYNECOLOGY

## 2021-01-22 PROCEDURE — 90715 TDAP VACCINE 7 YRS/> IM: CPT | Performed by: OBSTETRICS & GYNECOLOGY

## 2021-01-22 PROCEDURE — 82950 GLUCOSE TEST: CPT | Performed by: OBSTETRICS & GYNECOLOGY

## 2021-01-22 PROCEDURE — 36415 COLL VENOUS BLD VENIPUNCTURE: CPT | Performed by: OBSTETRICS & GYNECOLOGY

## 2021-01-22 PROCEDURE — 86780 TREPONEMA PALLIDUM: CPT | Mod: 90 | Performed by: OBSTETRICS & GYNECOLOGY

## 2021-01-22 PROCEDURE — 90471 IMMUNIZATION ADMIN: CPT | Performed by: OBSTETRICS & GYNECOLOGY

## 2021-01-22 PROCEDURE — 99000 SPECIMEN HANDLING OFFICE-LAB: CPT | Performed by: OBSTETRICS & GYNECOLOGY

## 2021-01-22 NOTE — PROGRESS NOTES
Syphilis is a sexually transmitted disease that can cause birth defects in the babies of untreated mothers. Every pregnant patient is tested for syphilis early in each pregnancy as part of the routine lab work. The [place] Department of [facility] has seen an increase in the rate of syphilis in [place]. The [facility] now recommends testing for syphilis 3 times during a pregnancy, the new prenatal visit, 28 weeks and when admitted for delivery. Patient accepts lab testing for syphilis.

## 2021-01-22 NOTE — NURSING NOTE
Prior to immunization administration, verified patients identity using patient s name and date of birth. Please see Immunization Activity for additional information.     Screening Questionnaire for Adult Immunization    Are you sick today?   No   Do you have allergies to medications, food, a vaccine component or latex?   No   Have you ever had a serious reaction after receiving a vaccination?   No   Do you have a long-term health problem with heart, lung, kidney, or metabolic disease (e.g., diabetes), asthma, a blood disorder, no spleen, complement component deficiency, a cochlear implant, or a spinal fluid leak?  Are you on long-term aspirin therapy?   No   Do you have cancer, leukemia, HIV/AIDS, or any other immune system problem?   No   Do you have a parent, brother, or sister with an immune system problem?   No   In the past 3 months, have you taken medications that affect  your immune system, such as prednisone, other steroids, or anticancer drugs; drugs for the treatment of rheumatoid arthritis, Crohn s disease, or psoriasis; or have you had radiation treatments?   No   Have you had a seizure, or a brain or other nervous system problem?   No   During the past year, have you received a transfusion of blood or blood    products, or been given immune (gamma) globulin or antiviral drug?   No   For women: Are you pregnant or is there a chance you could become       pregnant during the next month?   pregnant   Have you received any vaccinations in the past 4 weeks?   No     Immunization questionnaire answers were all negative.        Per orders of Dr. Noel, injection of Tdap given by Dorie Aguilar MA. Patient instructed to remain in clinic for 15 minutes afterwards, and to report any adverse reaction to me immediately.       Screening performed by Dorie Aguilar MA on 1/22/2021 at 2:17 PM.

## 2021-01-22 NOTE — PROGRESS NOTES
Prenatal visit doing well. We reviewed her last pap smear result. Cotesting pap smear wnl in January 2020, 1 year after LEEP for OSEI 2-3 with clear margins. Due for a repeat pap smear but will perform it postpartum at this point.  Discussed breast feeding. Had a lot of challenges the last time. Will think about whether or not she wants to breastfeed.  Discussed her mood changes. Continue to work with her therapist. I recommend adding in exercise. Consider starting an selective serotonin reuptake inhibitor to fend off PP anxiety and or depression. Accepts TDAP vaccine.  RTC in 2 weeks  Rayna Noel MD

## 2021-01-22 NOTE — TELEPHONE ENCOUNTER
"Pt requesting prescriptions be sent to her pharmacy for Vitamin C and Iron so that her insurance will pay for it.     Reason for Disposition    [1] Request for URGENT new prescription or refill of \"essential\" medication (i.e., likelihood of harm to patient if not taken) AND [2] triager unable to fill per unit policy    Protocols used: MEDICATION QUESTION CALL-A-AH      "

## 2021-01-23 LAB — T PALLIDUM AB SER QL: NONREACTIVE

## 2021-01-25 RX ORDER — ASCORBIC ACID 500 MG
500 TABLET ORAL DAILY
Qty: 90 TABLET | Refills: 1 | Status: ON HOLD | OUTPATIENT
Start: 2021-01-25 | End: 2022-09-07

## 2021-01-25 RX ORDER — FERROUS SULFATE 325(65) MG
325 TABLET ORAL
Qty: 90 TABLET | Refills: 1 | Status: ON HOLD | OUTPATIENT
Start: 2021-01-25 | End: 2022-09-07

## 2021-01-25 NOTE — TELEPHONE ENCOUNTER
Prescriptions for Iron and Vitamin C sent to pharmacy. Patient informed on vm that rx was sent to pharmacy.     Marie Jacobo RN

## 2021-02-01 ENCOUNTER — TELEPHONE (OUTPATIENT)
Dept: OBGYN | Facility: CLINIC | Age: 26
End: 2021-02-01

## 2021-02-01 NOTE — TELEPHONE ENCOUNTER
Patient calling to report COVID exposure on 1/28. Is quarantining and we rescheduled appointment to 2/9. Wondering if there's anything she should do aside from get tested if she develops symptoms.

## 2021-02-01 NOTE — TELEPHONE ENCOUNTER
Exposed to covid at a baby shower 1/28/21  Someone at her baby shower developed Covid   Does not feel she has sx's.  Just has some low back pain but knows    Eating healthy and checking her temps regularly. No sx's of loss of taste, fever, cough or fatigue. Taking care of her daughter and not sending her to  and monitoring her as well. Feeling a little overwhelmed emotionally.     Recommended pt keep her apt 2/4/21 as phone visit to touch base w Dr Noel and can go from there if she feels she needs to see her in office for any reason prior to there quarantine end. Pt would like that.    Applauded pt's care efforts of self and child - following recommendations and taking care of self and monitoring of sx's.    transferred to scheduling for phone visit.  Maribel Arriaga RN on 2/1/2021 at 9:50 AM

## 2021-02-04 ENCOUNTER — VIRTUAL VISIT (OUTPATIENT)
Dept: OBGYN | Facility: CLINIC | Age: 26
End: 2021-02-04
Payer: COMMERCIAL

## 2021-02-04 DIAGNOSIS — O09.93 SUPERVISION OF HIGH RISK PREGNANCY IN THIRD TRIMESTER: Primary | ICD-10-CM

## 2021-02-04 DIAGNOSIS — Z20.822 EXPOSURE TO COVID-19 VIRUS: ICD-10-CM

## 2021-02-04 PROCEDURE — 99207 PR PRENATAL VISIT: CPT | Performed by: OBSTETRICS & GYNECOLOGY

## 2021-02-04 NOTE — PROGRESS NOTES
"Hansa Ortiz is a 25 year old female who is being evaluated via a telephone visit.      What phone number would you like to be contacted at? [phone]        OBJECTIVE:     No vitals were obtained today due to virtual visit.    Physical Exam  {video visit exam brief selected:[id number]::\"GENERAL: Healthy, alert and no distress\",\"EYES: Eyes grossly normal to inspection.  No discharge or erythema, or obvious scleral/conjunctival abnormalities.\",\"RESP: No audible wheeze, cough, or visible cyanosis.  No visible retractions or increased work of breathing.  \",\"SKIN: Visible skin clear. No significant rash, abnormal pigmentation or lesions.\",\"NEURO: Cranial nerves grossly intact.  Mentation and speech appropriate for age.\",\"PSYCH: Mentation appears normal, affect normal/bright, judgement and insight intact, normal speech and appearance well-groomed.\"}          ASSESSMENT/PLAN:                                                      Phone call duration: *** minutes      ICD-10-CM    1. Supervision of high risk pregnancy in third trimester  O09.93        There are no Patient Instructions on file for this visit.    ***    Rayna Noel MD  [facility] [place]    "

## 2021-02-04 NOTE — Clinical Note
Please abstract the following data from this visit with this patient into the appropriate field in Epic:    Other Tests found in the patient's chart through Chart Review/Care Everywhere: abstract [facility] 1/14/20 NIL

## 2021-02-04 NOTE — PROGRESS NOTES
Hansa was exposed to a COVID positive person on 1/28 at a baby shower. Since then she developed congestion on 2/2. She has been having lower back pain prior to this exposure. Her daughter has mild symptoms but they are not distinguishable from allergies or her asthma. She would like to know if she could get testing done. She is having increased difficulty at work due to customer complaints about her attitude. 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 She is worried it will cost her her job.   I recommend testing for COVID due to the actual exposure and because it will give her peace of mind and it is also important to know if she has COVID due to the pregnancy as well. I advised her to do what she could to control how she responds to the negative stimulus from these customers. She has good fetal movement, no abdominal pain or contractions.     Total telephone visit time: 12 minutes     Rayna Noel MD

## 2021-02-08 DIAGNOSIS — Z20.822 EXPOSURE TO COVID-19 VIRUS: ICD-10-CM

## 2021-02-08 DIAGNOSIS — O09.93 SUPERVISION OF HIGH RISK PREGNANCY IN THIRD TRIMESTER: ICD-10-CM

## 2021-02-08 LAB
LABORATORY COMMENT REPORT: NORMAL
SARS-COV-2 RNA RESP QL NAA+PROBE: NEGATIVE
SARS-COV-2 RNA RESP QL NAA+PROBE: NORMAL
SPECIMEN SOURCE: NORMAL
SPECIMEN SOURCE: NORMAL

## 2021-02-08 PROCEDURE — U0003 INFECTIOUS AGENT DETECTION BY NUCLEIC ACID (DNA OR RNA); SEVERE ACUTE RESPIRATORY SYNDROME CORONAVIRUS 2 (SARS-COV-2) (CORONAVIRUS DISEASE [COVID-19]), AMPLIFIED PROBE TECHNIQUE, MAKING USE OF HIGH THROUGHPUT TECHNOLOGIES AS DESCRIBED BY CMS-2020-01-R: HCPCS | Performed by: OBSTETRICS & GYNECOLOGY

## 2021-02-08 PROCEDURE — U0005 INFEC AGEN DETEC AMPLI PROBE: HCPCS | Performed by: OBSTETRICS & GYNECOLOGY

## 2021-02-15 ENCOUNTER — TELEPHONE (OUTPATIENT)
Dept: OBGYN | Facility: CLINIC | Age: 26
End: 2021-02-15

## 2021-02-15 ENCOUNTER — HOSPITAL ENCOUNTER (OUTPATIENT)
Facility: CLINIC | Age: 26
Discharge: HOME | End: 2021-02-15
Attending: OBSTETRICS & GYNECOLOGY | Admitting: OBSTETRICS & GYNECOLOGY
Payer: COMMERCIAL

## 2021-02-15 ENCOUNTER — HOSPITAL ENCOUNTER (OUTPATIENT)
Facility: CLINIC | Age: 26
End: 2021-02-15
Admitting: OBSTETRICS & GYNECOLOGY
Payer: COMMERCIAL

## 2021-02-15 VITALS — DIASTOLIC BLOOD PRESSURE: 64 MMHG | SYSTOLIC BLOOD PRESSURE: 124 MMHG | TEMPERATURE: 98 F | RESPIRATION RATE: 16 BRPM

## 2021-02-15 PROBLEM — Z36.89 ENCOUNTER FOR TRIAGE IN PREGNANT PATIENT: Status: ACTIVE | Noted: 2021-02-15

## 2021-02-15 PROCEDURE — 59025 FETAL NON-STRESS TEST: CPT

## 2021-02-15 PROCEDURE — G0463 HOSPITAL OUTPT CLINIC VISIT: HCPCS | Mod: 25

## 2021-02-15 NOTE — TELEPHONE ENCOUNTER
"32w5d    Less than 10\" ago around 1100, sitting in chair at desk and chair broke and fell backwards and fell pretty hard onto her back and pelvis. Sat there for a few minutes and feels sore.  Light FM since fall  Did not hit her belly directly.    Instructed pt to be evaluated in [facility] at [facility] and expect about a 4 hour evaluation from the time of fall.    Pt verbalized understanding, in agreement with plan, and voiced no further questions.    [facility] notified pt coming in 20-30\" and to notify Dr Noel.    Maribel Arriaga, RN on 2/15/2021 at 11:12 AM    "

## 2021-02-16 NOTE — PLAN OF CARE
Hansa admitted to MAC room 235 for evaluation after fall.  Fall happened at 1045 this morning but wasn't able to arrive until this evening due to .oriented to room and call light. Placed on monitors with patient's consent.

## 2021-02-16 NOTE — DISCHARGE INSTRUCTIONS
Discharge Instruction for Undelivered Patients      You were seen for: Fetal Assessment  We Consulted: Dr. Noel  You had (Test or Medicine):NST     Diet:   Drink 8 to 12 glasses of liquids (milk, juice, water) every day.  You may eat meals and snacks.     Activity:  Count fetal kicks everyday (see handout)  Call your doctor or nurse midwife if your baby is moving less than usual.     Call your provider if you notice:  Swelling in your face or increased swelling in your hands or legs.  Headaches that are not relieved by Tylenol (acetaminophen).  Changes in your vision (blurring: seeing spots or stars.)  Nausea (sick to your stomach) and vomiting (throwing up).   Weight gain of 5 pounds or more per week.  Heartburn that doesn't go away.  Signs of bladder infection: pain when you urinate (use the toilet), need to go more often and more urgently.  The bag of montesinos (rupture of membranes) breaks, or you notice leaking in your underwear.  Bright red blood in your underwear.  Abdominal (lower belly) or stomach pain.  For first baby: Contractions (tightening) less than 5 minutes apart for one hour or more.  Second (plus) baby: Contractions (tightening) less than 10 minutes apart and getting stronger.  *If less than 34 weeks: Contractions (tightenings) more than 6 times in one hour.  Increase or change in vaginal discharge (note the color and amount)      Follow-up:  As scheduled in the clinic

## 2021-02-16 NOTE — PROVIDER NOTIFICATION
Dr. Noel notifed that patient was placed on the monitor and FHT with accelerations and moderate variability. Plan is to discharge patient and have her come in for her scheduled appointment.

## 2021-02-22 ENCOUNTER — PRENATAL OFFICE VISIT (OUTPATIENT)
Dept: OBGYN | Facility: CLINIC | Age: 26
End: 2021-02-22
Payer: COMMERCIAL

## 2021-02-22 VITALS — SYSTOLIC BLOOD PRESSURE: 128 MMHG | BODY MASS INDEX: 29.29 KG/M2 | DIASTOLIC BLOOD PRESSURE: 76 MMHG | WEIGHT: 176 LBS

## 2021-02-22 DIAGNOSIS — O09.93 SUPERVISION OF HIGH RISK PREGNANCY IN THIRD TRIMESTER: ICD-10-CM

## 2021-02-22 PROCEDURE — 99207 PR PRENATAL VISIT: CPT | Performed by: OBSTETRICS & GYNECOLOGY

## 2021-02-22 NOTE — PROGRESS NOTES
Prenatal Visit: Hansa is doing well overall. She states that she has been on edge lately. She is still continuing on in therapy. She states that her anxiety has helped her cope as a single mother up until this point. She is not interested in medication at this time because she is almost at the end of the pregnancy. We discussed the potential need for medication postpartum. Will continue to address.  Plan for GBS at the next visit.  Rayna Noel MD

## 2021-03-15 ENCOUNTER — PRENATAL OFFICE VISIT (OUTPATIENT)
Dept: OBGYN | Facility: CLINIC | Age: 26
End: 2021-03-15
Payer: COMMERCIAL

## 2021-03-15 VITALS — WEIGHT: 182.4 LBS | SYSTOLIC BLOOD PRESSURE: 108 MMHG | DIASTOLIC BLOOD PRESSURE: 62 MMHG | BODY MASS INDEX: 30.35 KG/M2

## 2021-03-15 DIAGNOSIS — Z36.85 ANTENATAL SCREENING FOR STREPTOCOCCUS B: ICD-10-CM

## 2021-03-15 DIAGNOSIS — O09.93 SUPERVISION OF HIGH RISK PREGNANCY IN THIRD TRIMESTER: Primary | ICD-10-CM

## 2021-03-15 PROCEDURE — 87653 STREP B DNA AMP PROBE: CPT | Performed by: OBSTETRICS & GYNECOLOGY

## 2021-03-15 PROCEDURE — 99207 PR PRENATAL VISIT: CPT | Performed by: OBSTETRICS & GYNECOLOGY

## 2021-03-15 NOTE — PROGRESS NOTES
Prenatal Visit: doing well. Good fetal movement. No other concerns. GBS done today. Labor precautions reviewed.  RTC in 1 week  Rayna Noel MD

## 2021-03-17 LAB
GP B STREP DNA SPEC QL NAA+PROBE: POSITIVE
SPECIMEN SOURCE: ABNORMAL

## 2021-03-23 ENCOUNTER — PRENATAL OFFICE VISIT (OUTPATIENT)
Dept: OBGYN | Facility: CLINIC | Age: 26
End: 2021-03-23
Payer: COMMERCIAL

## 2021-03-23 VITALS — DIASTOLIC BLOOD PRESSURE: 52 MMHG | SYSTOLIC BLOOD PRESSURE: 118 MMHG | WEIGHT: 182 LBS | BODY MASS INDEX: 30.29 KG/M2

## 2021-03-23 DIAGNOSIS — O09.93 SUPERVISION OF HIGH RISK PREGNANCY IN THIRD TRIMESTER: ICD-10-CM

## 2021-03-23 PROCEDURE — 99207 PR PRENATAL VISIT: CPT | Performed by: OBSTETRICS & GYNECOLOGY

## 2021-03-23 NOTE — PROGRESS NOTES
Prenatal Visit: good fetal movement. Cervix is 0.5cm today. Labor precautions reviewed. Hansa is getting more uncomfortable but fears a PLTCS so does not want an induction unless absolutely medically indicated.  RTC in one week  Rayna Noel MD

## 2021-03-24 ENCOUNTER — TELEPHONE (OUTPATIENT)
Dept: OBGYN | Facility: CLINIC | Age: 26
End: 2021-03-24

## 2021-03-24 NOTE — TELEPHONE ENCOUNTER
Patient saw ME yesterday and she told her to get a Covid test. Patient is asking how to go about that. Please call patient back today.

## 2021-03-24 NOTE — TELEPHONE ENCOUNTER
This was misunderstood. We will only do one ahead of her admission if we schedule an induction. Nothing to schedule for now

## 2021-03-24 NOTE — TELEPHONE ENCOUNTER
38w0d  Was told needed a covid test.    Patient not scheduled for induction.    Routing to  to review.     Marie Jacobo RN

## 2021-03-29 ENCOUNTER — PRENATAL OFFICE VISIT (OUTPATIENT)
Dept: OBGYN | Facility: CLINIC | Age: 26
End: 2021-03-29
Payer: COMMERCIAL

## 2021-03-29 VITALS — BODY MASS INDEX: 30.22 KG/M2 | SYSTOLIC BLOOD PRESSURE: 108 MMHG | DIASTOLIC BLOOD PRESSURE: 60 MMHG | WEIGHT: 181.6 LBS

## 2021-03-29 DIAGNOSIS — O09.93 SUPERVISION OF HIGH RISK PREGNANCY IN THIRD TRIMESTER: Primary | ICD-10-CM

## 2021-03-29 PROCEDURE — 99207 PR PRENATAL VISIT: CPT | Performed by: OBSTETRICS & GYNECOLOGY

## 2021-03-29 NOTE — PROGRESS NOTES
Prenatal Visit: Doing well. GBS positive. Cervix is unchanged. Fetal movement is present. Not as active since this morning. Active during the exam today. Discussed length of pregnancy. Would not recommend beyond 41 weeks. Labor precautions reviewed.  RTC in one week  Rayna Noel MD

## 2021-04-05 ENCOUNTER — PRENATAL OFFICE VISIT (OUTPATIENT)
Dept: OBGYN | Facility: CLINIC | Age: 26
End: 2021-04-05
Payer: COMMERCIAL

## 2021-04-05 VITALS — WEIGHT: 184 LBS | SYSTOLIC BLOOD PRESSURE: 130 MMHG | BODY MASS INDEX: 30.62 KG/M2 | DIASTOLIC BLOOD PRESSURE: 64 MMHG

## 2021-04-05 DIAGNOSIS — O09.93 SUPERVISION OF HIGH RISK PREGNANCY IN THIRD TRIMESTER: ICD-10-CM

## 2021-04-05 PROCEDURE — 99207 PR PRENATAL VISIT: CPT | Performed by: OBSTETRICS & GYNECOLOGY

## 2021-04-05 NOTE — PROGRESS NOTES
Prenatal Visit: doing well. Not having contractions. Membrane sweep. Recommend a blood pressure recheck in 1 day. Prehypertensive today. Discussed IOL at 41 weeks if BP is normal and if labor does not happen on its own.  Rayna Noel MD

## 2021-04-06 ENCOUNTER — ALLIED HEALTH/NURSE VISIT (OUTPATIENT)
Dept: NURSING | Facility: CLINIC | Age: 26
End: 2021-04-06
Payer: COMMERCIAL

## 2021-04-06 VITALS — SYSTOLIC BLOOD PRESSURE: 133 MMHG | HEART RATE: 101 BPM | DIASTOLIC BLOOD PRESSURE: 81 MMHG

## 2021-04-06 DIAGNOSIS — O09.91 SUPERVISION OF HIGH RISK PREGNANCY IN FIRST TRIMESTER: Primary | ICD-10-CM

## 2021-04-06 LAB
SARS-COV-2 RNA RESP QL NAA+PROBE: NORMAL
SPECIMEN SOURCE: NORMAL

## 2021-04-06 PROCEDURE — U0003 INFECTIOUS AGENT DETECTION BY NUCLEIC ACID (DNA OR RNA); SEVERE ACUTE RESPIRATORY SYNDROME CORONAVIRUS 2 (SARS-COV-2) (CORONAVIRUS DISEASE [COVID-19]), AMPLIFIED PROBE TECHNIQUE, MAKING USE OF HIGH THROUGHPUT TECHNOLOGIES AS DESCRIBED BY CMS-2020-01-R: HCPCS | Performed by: OBSTETRICS & GYNECOLOGY

## 2021-04-06 PROCEDURE — 99207 PR NO CHARGE NURSE ONLY: CPT

## 2021-04-06 PROCEDURE — U0005 INFEC AGEN DETEC AMPLI PROBE: HCPCS | Performed by: OBSTETRICS & GYNECOLOGY

## 2021-04-06 RX ORDER — NALOXONE HYDROCHLORIDE 0.4 MG/ML
0.4 INJECTION, SOLUTION INTRAMUSCULAR; INTRAVENOUS; SUBCUTANEOUS
Status: CANCELLED | OUTPATIENT
Start: 2021-04-06

## 2021-04-06 RX ORDER — LIDOCAINE 40 MG/G
CREAM TOPICAL
Status: CANCELLED | OUTPATIENT
Start: 2021-04-06

## 2021-04-06 RX ORDER — ACETAMINOPHEN 325 MG/1
650 TABLET ORAL EVERY 4 HOURS PRN
Status: CANCELLED | OUTPATIENT
Start: 2021-04-06

## 2021-04-06 RX ORDER — OXYTOCIN/0.9 % SODIUM CHLORIDE 30/500 ML
1-24 PLASTIC BAG, INJECTION (ML) INTRAVENOUS CONTINUOUS
Status: CANCELLED | OUTPATIENT
Start: 2021-04-06

## 2021-04-06 RX ORDER — METOCLOPRAMIDE HYDROCHLORIDE 5 MG/ML
10 INJECTION INTRAMUSCULAR; INTRAVENOUS EVERY 6 HOURS PRN
Status: CANCELLED | OUTPATIENT
Start: 2021-04-06

## 2021-04-06 RX ORDER — METHYLERGONOVINE MALEATE 0.2 MG/ML
200 INJECTION INTRAVENOUS
Status: CANCELLED | OUTPATIENT
Start: 2021-04-06

## 2021-04-06 RX ORDER — OXYCODONE AND ACETAMINOPHEN 5; 325 MG/1; MG/1
1 TABLET ORAL
Status: CANCELLED | OUTPATIENT
Start: 2021-04-06

## 2021-04-06 RX ORDER — NALOXONE HYDROCHLORIDE 0.4 MG/ML
0.2 INJECTION, SOLUTION INTRAMUSCULAR; INTRAVENOUS; SUBCUTANEOUS
Status: CANCELLED | OUTPATIENT
Start: 2021-04-06

## 2021-04-06 RX ORDER — SODIUM CHLORIDE, SODIUM LACTATE, POTASSIUM CHLORIDE, CALCIUM CHLORIDE 600; 310; 30; 20 MG/100ML; MG/100ML; MG/100ML; MG/100ML
INJECTION, SOLUTION INTRAVENOUS CONTINUOUS
Status: CANCELLED | OUTPATIENT
Start: 2021-04-06

## 2021-04-06 RX ORDER — PROCHLORPERAZINE 25 MG
25 SUPPOSITORY, RECTAL RECTAL EVERY 12 HOURS PRN
Status: CANCELLED | OUTPATIENT
Start: 2021-04-06

## 2021-04-06 RX ORDER — TERBUTALINE SULFATE 1 MG/ML
0.25 INJECTION, SOLUTION SUBCUTANEOUS
Status: CANCELLED | OUTPATIENT
Start: 2021-04-06

## 2021-04-06 RX ORDER — FENTANYL CITRATE 50 UG/ML
50-100 INJECTION, SOLUTION INTRAMUSCULAR; INTRAVENOUS
Status: CANCELLED | OUTPATIENT
Start: 2021-04-06

## 2021-04-06 RX ORDER — ONDANSETRON 2 MG/ML
4 INJECTION INTRAMUSCULAR; INTRAVENOUS EVERY 6 HOURS PRN
Status: CANCELLED | OUTPATIENT
Start: 2021-04-06

## 2021-04-06 RX ORDER — CARBOPROST TROMETHAMINE 250 UG/ML
250 INJECTION, SOLUTION INTRAMUSCULAR
Status: CANCELLED | OUTPATIENT
Start: 2021-04-06

## 2021-04-06 RX ORDER — IBUPROFEN 200 MG
800 TABLET ORAL
Status: CANCELLED | OUTPATIENT
Start: 2021-04-06

## 2021-04-06 RX ORDER — OXYTOCIN/0.9 % SODIUM CHLORIDE 30/500 ML
100-340 PLASTIC BAG, INJECTION (ML) INTRAVENOUS CONTINUOUS PRN
Status: CANCELLED | OUTPATIENT
Start: 2021-04-06

## 2021-04-06 RX ORDER — OXYTOCIN 10 [USP'U]/ML
10 INJECTION, SOLUTION INTRAMUSCULAR; INTRAVENOUS
Status: CANCELLED | OUTPATIENT
Start: 2021-04-06

## 2021-04-06 NOTE — PROGRESS NOTES
Hansa called about her scheduled IOL for 4/9 due to prehypertensive blood pressures. COVID testing done today. Informed to call at 6:30 and ask for the Charge RN  Rayna Noel MD  4/6/2021

## 2021-04-06 NOTE — NURSING NOTE
Pt here for BP check, feeling well. Consulted with Dr. Noel, ok to see pt next week or elective induction Friday. Discussed induction process with Hansa. She would prefer this. Dr. Noel will place COVID order, pt escorted to lab. Will be contacted with induction details.  Rochelle Kumar RN on 4/6/2021 at 9:55 AM

## 2021-04-07 LAB
LABORATORY COMMENT REPORT: NORMAL
SARS-COV-2 RNA RESP QL NAA+PROBE: NEGATIVE
SPECIMEN SOURCE: NORMAL

## 2021-04-09 ENCOUNTER — ANESTHESIA EVENT (OUTPATIENT)
Dept: OBGYN | Facility: CLINIC | Age: 26
End: 2021-04-09
Payer: COMMERCIAL

## 2021-04-09 ENCOUNTER — HOSPITAL ENCOUNTER (INPATIENT)
Facility: CLINIC | Age: 26
LOS: 2 days | Discharge: HOME | End: 2021-04-11
Attending: OBSTETRICS & GYNECOLOGY | Admitting: OBSTETRICS & GYNECOLOGY
Payer: COMMERCIAL

## 2021-04-09 ENCOUNTER — ANESTHESIA (OUTPATIENT)
Dept: OBGYN | Facility: CLINIC | Age: 26
End: 2021-04-09
Payer: COMMERCIAL

## 2021-04-09 PROBLEM — O09.93 SUPERVISION OF HIGH RISK PREGNANCY IN THIRD TRIMESTER: Status: ACTIVE | Noted: 2021-04-09

## 2021-04-09 LAB
ABO + RH BLD: NORMAL
ABO + RH BLD: NORMAL
AMPHETAMINES UR QL SCN: NEGATIVE
BASOPHILS # BLD AUTO: 0 10E9/L (ref 0–0.2)
BASOPHILS NFR BLD AUTO: 0.3 %
BLD GP AB SCN SERPL QL: NORMAL
BLOOD BANK CMNT PATIENT-IMP: NORMAL
CANNABINOIDS UR QL: NEGATIVE
COCAINE UR QL: NEGATIVE
DIFFERENTIAL METHOD BLD: ABNORMAL
EOSINOPHIL # BLD AUTO: 0.1 10E9/L (ref 0–0.7)
EOSINOPHIL NFR BLD AUTO: 1.8 %
ERYTHROCYTE [DISTWIDTH] IN BLOOD BY AUTOMATED COUNT: 14.5 % (ref 10–15)
HCT VFR BLD AUTO: 32 % (ref 35–47)
HGB BLD-MCNC: 10.4 G/DL (ref 11.7–15.7)
IMM GRANULOCYTES # BLD: 0.2 10E9/L (ref 0–0.4)
IMM GRANULOCYTES NFR BLD: 1.9 %
LYMPHOCYTES # BLD AUTO: 2 10E9/L (ref 0.8–5.3)
LYMPHOCYTES NFR BLD AUTO: 25 %
MCH RBC QN AUTO: 30.1 PG (ref 26.5–33)
MCHC RBC AUTO-ENTMCNC: 32.5 G/DL (ref 31.5–36.5)
MCV RBC AUTO: 93 FL (ref 78–100)
MONOCYTES # BLD AUTO: 0.6 10E9/L (ref 0–1.3)
MONOCYTES NFR BLD AUTO: 7.4 %
NEUTROPHILS # BLD AUTO: 5 10E9/L (ref 1.6–8.3)
NEUTROPHILS NFR BLD AUTO: 63.6 %
NRBC # BLD AUTO: 0 10*3/UL
NRBC BLD AUTO-RTO: 0 /100
OPIATES UR QL SCN: NEGATIVE
PCP UR QL SCN: NEGATIVE
PLATELET # BLD AUTO: 256 10E9/L (ref 150–450)
RBC # BLD AUTO: 3.45 10E12/L (ref 3.8–5.2)
SPECIMEN EXP DATE BLD: NORMAL
T PALLIDUM AB SER QL: NONREACTIVE
WBC # BLD AUTO: 7.9 10E9/L (ref 4–11)

## 2021-04-09 PROCEDURE — 250N000011 HC RX IP 250 OP 636: Performed by: ANESTHESIOLOGY

## 2021-04-09 PROCEDURE — 250N000013 HC RX MED GY IP 250 OP 250 PS 637: Performed by: OBSTETRICS & GYNECOLOGY

## 2021-04-09 PROCEDURE — 258N000003 HC RX IP 258 OP 636: Performed by: ANESTHESIOLOGY

## 2021-04-09 PROCEDURE — 86850 RBC ANTIBODY SCREEN: CPT | Performed by: OBSTETRICS & GYNECOLOGY

## 2021-04-09 PROCEDURE — 722N000001 HC LABOR CARE VAGINAL DELIVERY SINGLE

## 2021-04-09 PROCEDURE — 59400 OBSTETRICAL CARE: CPT | Performed by: OBSTETRICS & GYNECOLOGY

## 2021-04-09 PROCEDURE — 86901 BLOOD TYPING SEROLOGIC RH(D): CPT | Performed by: OBSTETRICS & GYNECOLOGY

## 2021-04-09 PROCEDURE — 3E0R3BZ INTRODUCTION OF ANESTHETIC AGENT INTO SPINAL CANAL, PERCUTANEOUS APPROACH: ICD-10-PCS | Performed by: ANESTHESIOLOGY

## 2021-04-09 PROCEDURE — 258N000003 HC RX IP 258 OP 636: Performed by: OBSTETRICS & GYNECOLOGY

## 2021-04-09 PROCEDURE — 10907ZC DRAINAGE OF AMNIOTIC FLUID, THERAPEUTIC FROM PRODUCTS OF CONCEPTION, VIA NATURAL OR ARTIFICIAL OPENING: ICD-10-PCS | Performed by: OBSTETRICS & GYNECOLOGY

## 2021-04-09 PROCEDURE — 36415 COLL VENOUS BLD VENIPUNCTURE: CPT | Performed by: OBSTETRICS & GYNECOLOGY

## 2021-04-09 PROCEDURE — 86780 TREPONEMA PALLIDUM: CPT | Performed by: OBSTETRICS & GYNECOLOGY

## 2021-04-09 PROCEDURE — 0UQMXZZ REPAIR VULVA, EXTERNAL APPROACH: ICD-10-PCS | Performed by: OBSTETRICS & GYNECOLOGY

## 2021-04-09 PROCEDURE — 80307 DRUG TEST PRSMV CHEM ANLYZR: CPT | Performed by: OBSTETRICS & GYNECOLOGY

## 2021-04-09 PROCEDURE — 85025 COMPLETE CBC W/AUTO DIFF WBC: CPT | Performed by: OBSTETRICS & GYNECOLOGY

## 2021-04-09 PROCEDURE — 250N000009 HC RX 250: Performed by: OBSTETRICS & GYNECOLOGY

## 2021-04-09 PROCEDURE — 370N000003 HC ANESTHESIA WARD SERVICE

## 2021-04-09 PROCEDURE — 86900 BLOOD TYPING SEROLOGIC ABO: CPT | Performed by: OBSTETRICS & GYNECOLOGY

## 2021-04-09 PROCEDURE — 120N000001 HC R&B MED SURG/OB

## 2021-04-09 PROCEDURE — 00HU33Z INSERTION OF INFUSION DEVICE INTO SPINAL CANAL, PERCUTANEOUS APPROACH: ICD-10-PCS | Performed by: ANESTHESIOLOGY

## 2021-04-09 PROCEDURE — 3E0P7VZ INTRODUCTION OF HORMONE INTO FEMALE REPRODUCTIVE, VIA NATURAL OR ARTIFICIAL OPENING: ICD-10-PCS | Performed by: OBSTETRICS & GYNECOLOGY

## 2021-04-09 PROCEDURE — 250N000011 HC RX IP 250 OP 636: Performed by: OBSTETRICS & GYNECOLOGY

## 2021-04-09 RX ORDER — ROPIVACAINE HYDROCHLORIDE 2 MG/ML
10 INJECTION, SOLUTION EPIDURAL; INFILTRATION; PERINEURAL ONCE
Status: DISCONTINUED | OUTPATIENT
Start: 2021-04-09 | End: 2021-04-10 | Stop reason: CLARIF

## 2021-04-09 RX ORDER — SODIUM CHLORIDE, SODIUM LACTATE, POTASSIUM CHLORIDE, CALCIUM CHLORIDE 600; 310; 30; 20 MG/100ML; MG/100ML; MG/100ML; MG/100ML
INJECTION, SOLUTION INTRAVENOUS CONTINUOUS
Status: DISCONTINUED | OUTPATIENT
Start: 2021-04-09 | End: 2021-04-09

## 2021-04-09 RX ORDER — MISOPROSTOL 200 UG/1
800 TABLET ORAL
Status: DISCONTINUED | OUTPATIENT
Start: 2021-04-09 | End: 2021-04-11 | Stop reason: HOSPADM

## 2021-04-09 RX ORDER — EPHEDRINE SULFATE 50 MG/ML
5 INJECTION, SOLUTION INTRAMUSCULAR; INTRAVENOUS; SUBCUTANEOUS
Status: DISCONTINUED | OUTPATIENT
Start: 2021-04-09 | End: 2021-04-10 | Stop reason: CLARIF

## 2021-04-09 RX ORDER — LIDOCAINE 40 MG/G
CREAM TOPICAL
Status: DISCONTINUED | OUTPATIENT
Start: 2021-04-09 | End: 2021-04-09

## 2021-04-09 RX ORDER — NALOXONE HYDROCHLORIDE 0.4 MG/ML
0.2 INJECTION, SOLUTION INTRAMUSCULAR; INTRAVENOUS; SUBCUTANEOUS
Status: DISCONTINUED | OUTPATIENT
Start: 2021-04-09 | End: 2021-04-11 | Stop reason: HOSPADM

## 2021-04-09 RX ORDER — TRANEXAMIC ACID 10 MG/ML
1 INJECTION, SOLUTION INTRAVENOUS EVERY 30 MIN PRN
Status: DISCONTINUED | OUTPATIENT
Start: 2021-04-09 | End: 2021-04-11 | Stop reason: HOSPADM

## 2021-04-09 RX ORDER — ONDANSETRON 2 MG/ML
4 INJECTION INTRAMUSCULAR; INTRAVENOUS EVERY 6 HOURS PRN
Status: DISCONTINUED | OUTPATIENT
Start: 2021-04-09 | End: 2021-04-10 | Stop reason: CLARIF

## 2021-04-09 RX ORDER — NALOXONE HYDROCHLORIDE 0.4 MG/ML
0.4 INJECTION, SOLUTION INTRAMUSCULAR; INTRAVENOUS; SUBCUTANEOUS
Status: DISCONTINUED | OUTPATIENT
Start: 2021-04-09 | End: 2021-04-11 | Stop reason: HOSPADM

## 2021-04-09 RX ORDER — SODIUM CHLORIDE 9 MG/ML
INJECTION, SOLUTION INTRAVENOUS CONTINUOUS
Status: DISCONTINUED | OUTPATIENT
Start: 2021-04-09 | End: 2021-04-09

## 2021-04-09 RX ORDER — OXYTOCIN/0.9 % SODIUM CHLORIDE 30/500 ML
340 PLASTIC BAG, INJECTION (ML) INTRAVENOUS CONTINUOUS PRN
Status: DISCONTINUED | OUTPATIENT
Start: 2021-04-09 | End: 2021-04-11 | Stop reason: HOSPADM

## 2021-04-09 RX ORDER — NALOXONE HYDROCHLORIDE 0.4 MG/ML
0.2 INJECTION, SOLUTION INTRAMUSCULAR; INTRAVENOUS; SUBCUTANEOUS
Status: DISCONTINUED | OUTPATIENT
Start: 2021-04-09 | End: 2021-04-09

## 2021-04-09 RX ORDER — IBUPROFEN 400 MG/1
800 TABLET, FILM COATED ORAL EVERY 6 HOURS PRN
Status: DISCONTINUED | OUTPATIENT
Start: 2021-04-09 | End: 2021-04-11 | Stop reason: HOSPADM

## 2021-04-09 RX ORDER — CARBOPROST TROMETHAMINE 250 UG/ML
250 INJECTION, SOLUTION INTRAMUSCULAR
Status: DISCONTINUED | OUTPATIENT
Start: 2021-04-09 | End: 2021-04-11 | Stop reason: HOSPADM

## 2021-04-09 RX ORDER — HYDROCORTISONE 2.5 %
CREAM (GRAM) TOPICAL 3 TIMES DAILY PRN
Status: DISCONTINUED | OUTPATIENT
Start: 2021-04-09 | End: 2021-04-11 | Stop reason: HOSPADM

## 2021-04-09 RX ORDER — MODIFIED LANOLIN
OINTMENT (GRAM) TOPICAL
Status: DISCONTINUED | OUTPATIENT
Start: 2021-04-09 | End: 2021-04-11 | Stop reason: HOSPADM

## 2021-04-09 RX ORDER — ACETAMINOPHEN 325 MG/1
650 TABLET ORAL EVERY 4 HOURS PRN
Status: DISCONTINUED | OUTPATIENT
Start: 2021-04-09 | End: 2021-04-09

## 2021-04-09 RX ORDER — PROCHLORPERAZINE 25 MG
25 SUPPOSITORY, RECTAL RECTAL EVERY 12 HOURS PRN
Status: DISCONTINUED | OUTPATIENT
Start: 2021-04-09 | End: 2021-04-09

## 2021-04-09 RX ORDER — AMOXICILLIN 250 MG
1 CAPSULE ORAL 2 TIMES DAILY
Status: DISCONTINUED | OUTPATIENT
Start: 2021-04-09 | End: 2021-04-11 | Stop reason: HOSPADM

## 2021-04-09 RX ORDER — IBUPROFEN 400 MG/1
800 TABLET, FILM COATED ORAL
Status: COMPLETED | OUTPATIENT
Start: 2021-04-09 | End: 2021-04-09

## 2021-04-09 RX ORDER — METHYLERGONOVINE MALEATE 0.2 MG/ML
200 INJECTION INTRAVENOUS
Status: DISCONTINUED | OUTPATIENT
Start: 2021-04-09 | End: 2021-04-11 | Stop reason: HOSPADM

## 2021-04-09 RX ORDER — AMOXICILLIN 250 MG
2 CAPSULE ORAL 2 TIMES DAILY
Status: DISCONTINUED | OUTPATIENT
Start: 2021-04-09 | End: 2021-04-11 | Stop reason: HOSPADM

## 2021-04-09 RX ORDER — ASCORBIC ACID 500 MG
500 TABLET ORAL DAILY
Status: DISCONTINUED | OUTPATIENT
Start: 2021-04-10 | End: 2021-04-11 | Stop reason: HOSPADM

## 2021-04-09 RX ORDER — OXYTOCIN 10 [USP'U]/ML
10 INJECTION, SOLUTION INTRAMUSCULAR; INTRAVENOUS
Status: DISCONTINUED | OUTPATIENT
Start: 2021-04-09 | End: 2021-04-11 | Stop reason: HOSPADM

## 2021-04-09 RX ORDER — OXYTOCIN/0.9 % SODIUM CHLORIDE 30/500 ML
100-340 PLASTIC BAG, INJECTION (ML) INTRAVENOUS CONTINUOUS PRN
Status: COMPLETED | OUTPATIENT
Start: 2021-04-09 | End: 2021-04-09

## 2021-04-09 RX ORDER — FENTANYL CITRATE 50 UG/ML
100 INJECTION, SOLUTION INTRAMUSCULAR; INTRAVENOUS ONCE
Status: DISCONTINUED | OUTPATIENT
Start: 2021-04-09 | End: 2021-04-10 | Stop reason: CLARIF

## 2021-04-09 RX ORDER — LIDOCAINE HYDROCHLORIDE AND EPINEPHRINE 15; 5 MG/ML; UG/ML
3 INJECTION, SOLUTION EPIDURAL
Status: DISCONTINUED | OUTPATIENT
Start: 2021-04-09 | End: 2021-04-10 | Stop reason: CLARIF

## 2021-04-09 RX ORDER — NALOXONE HYDROCHLORIDE 0.4 MG/ML
0.4 INJECTION, SOLUTION INTRAMUSCULAR; INTRAVENOUS; SUBCUTANEOUS
Status: DISCONTINUED | OUTPATIENT
Start: 2021-04-09 | End: 2021-04-09

## 2021-04-09 RX ORDER — FENTANYL CITRATE 50 UG/ML
50-100 INJECTION, SOLUTION INTRAMUSCULAR; INTRAVENOUS
Status: DISCONTINUED | OUTPATIENT
Start: 2021-04-09 | End: 2021-04-09

## 2021-04-09 RX ORDER — TERBUTALINE SULFATE 1 MG/ML
0.25 INJECTION, SOLUTION SUBCUTANEOUS
Status: DISCONTINUED | OUTPATIENT
Start: 2021-04-09 | End: 2021-04-09

## 2021-04-09 RX ORDER — ONDANSETRON 2 MG/ML
4 INJECTION INTRAMUSCULAR; INTRAVENOUS EVERY 6 HOURS PRN
Status: DISCONTINUED | OUTPATIENT
Start: 2021-04-09 | End: 2021-04-11 | Stop reason: HOSPADM

## 2021-04-09 RX ORDER — NALBUPHINE HYDROCHLORIDE 10 MG/ML
2.5-5 INJECTION, SOLUTION INTRAMUSCULAR; INTRAVENOUS; SUBCUTANEOUS EVERY 6 HOURS PRN
Status: DISCONTINUED | OUTPATIENT
Start: 2021-04-09 | End: 2021-04-10 | Stop reason: CLARIF

## 2021-04-09 RX ORDER — OXYTOCIN 10 [USP'U]/ML
10 INJECTION, SOLUTION INTRAMUSCULAR; INTRAVENOUS
Status: DISCONTINUED | OUTPATIENT
Start: 2021-04-09 | End: 2021-04-09

## 2021-04-09 RX ORDER — OXYTOCIN/0.9 % SODIUM CHLORIDE 30/500 ML
100 PLASTIC BAG, INJECTION (ML) INTRAVENOUS CONTINUOUS
Status: DISCONTINUED | OUTPATIENT
Start: 2021-04-09 | End: 2021-04-11 | Stop reason: HOSPADM

## 2021-04-09 RX ORDER — OXYCODONE AND ACETAMINOPHEN 5; 325 MG/1; MG/1
1 TABLET ORAL
Status: DISCONTINUED | OUTPATIENT
Start: 2021-04-09 | End: 2021-04-09

## 2021-04-09 RX ORDER — ONDANSETRON 4 MG/1
4 TABLET, ORALLY DISINTEGRATING ORAL EVERY 6 HOURS PRN
Status: DISCONTINUED | OUTPATIENT
Start: 2021-04-09 | End: 2021-04-10 | Stop reason: CLARIF

## 2021-04-09 RX ORDER — OXYTOCIN/0.9 % SODIUM CHLORIDE 30/500 ML
1-24 PLASTIC BAG, INJECTION (ML) INTRAVENOUS CONTINUOUS
Status: DISCONTINUED | OUTPATIENT
Start: 2021-04-09 | End: 2021-04-09

## 2021-04-09 RX ORDER — ACETAMINOPHEN 325 MG/1
650 TABLET ORAL EVERY 4 HOURS PRN
Status: DISCONTINUED | OUTPATIENT
Start: 2021-04-09 | End: 2021-04-11 | Stop reason: HOSPADM

## 2021-04-09 RX ORDER — PENICILLIN G POTASSIUM 5000000 [IU]/1
5 INJECTION, POWDER, FOR SOLUTION INTRAMUSCULAR; INTRAVENOUS ONCE
Status: COMPLETED | OUTPATIENT
Start: 2021-04-09 | End: 2021-04-09

## 2021-04-09 RX ORDER — ROPIVACAINE HYDROCHLORIDE 2 MG/ML
INJECTION, SOLUTION EPIDURAL; INFILTRATION; PERINEURAL
Status: COMPLETED | OUTPATIENT
Start: 2021-04-09 | End: 2021-04-09

## 2021-04-09 RX ORDER — BISACODYL 10 MG
10 SUPPOSITORY, RECTAL RECTAL DAILY PRN
Status: DISCONTINUED | OUTPATIENT
Start: 2021-04-11 | End: 2021-04-11 | Stop reason: HOSPADM

## 2021-04-09 RX ORDER — FERROUS SULFATE 325(65) MG
325 TABLET ORAL
Status: DISCONTINUED | OUTPATIENT
Start: 2021-04-10 | End: 2021-04-11 | Stop reason: HOSPADM

## 2021-04-09 RX ORDER — MISOPROSTOL 200 UG/1
800 TABLET ORAL
Status: COMPLETED | OUTPATIENT
Start: 2021-04-09 | End: 2021-04-09

## 2021-04-09 RX ORDER — OXYCODONE HYDROCHLORIDE 5 MG/1
5 TABLET ORAL EVERY 4 HOURS PRN
Status: DISCONTINUED | OUTPATIENT
Start: 2021-04-09 | End: 2021-04-11 | Stop reason: HOSPADM

## 2021-04-09 RX ORDER — TRANEXAMIC ACID 10 MG/ML
1 INJECTION, SOLUTION INTRAVENOUS EVERY 30 MIN PRN
Status: DISCONTINUED | OUTPATIENT
Start: 2021-04-09 | End: 2021-04-09

## 2021-04-09 RX ORDER — FENTANYL CITRATE 50 UG/ML
INJECTION, SOLUTION INTRAMUSCULAR; INTRAVENOUS
Status: COMPLETED | OUTPATIENT
Start: 2021-04-09 | End: 2021-04-09

## 2021-04-09 RX ORDER — PRENATAL VIT/IRON FUM/FOLIC AC 27MG-0.8MG
1 TABLET ORAL DAILY
Status: DISCONTINUED | OUTPATIENT
Start: 2021-04-10 | End: 2021-04-11 | Stop reason: HOSPADM

## 2021-04-09 RX ORDER — METOCLOPRAMIDE HYDROCHLORIDE 5 MG/ML
10 INJECTION INTRAMUSCULAR; INTRAVENOUS EVERY 6 HOURS PRN
Status: DISCONTINUED | OUTPATIENT
Start: 2021-04-09 | End: 2021-04-11 | Stop reason: HOSPADM

## 2021-04-09 RX ADMIN — ACETAMINOPHEN 650 MG: 325 TABLET, FILM COATED ORAL at 23:02

## 2021-04-09 RX ADMIN — SODIUM CHLORIDE, POTASSIUM CHLORIDE, SODIUM LACTATE AND CALCIUM CHLORIDE 125 ML: 600; 310; 30; 20 INJECTION, SOLUTION INTRAVENOUS at 19:29

## 2021-04-09 RX ADMIN — Medication 12 ML/HR: at 13:53

## 2021-04-09 RX ADMIN — SODIUM CHLORIDE, POTASSIUM CHLORIDE, SODIUM LACTATE AND CALCIUM CHLORIDE 500 ML: 600; 310; 30; 20 INJECTION, SOLUTION INTRAVENOUS at 20:40

## 2021-04-09 RX ADMIN — PENICILLIN G POTASSIUM 5 MILLION UNITS: 5000000 POWDER, FOR SOLUTION INTRAMUSCULAR; INTRAPLEURAL; INTRATHECAL; INTRAVENOUS at 09:30

## 2021-04-09 RX ADMIN — Medication 2 MILLI-UNITS/MIN: at 09:31

## 2021-04-09 RX ADMIN — SODIUM CHLORIDE, POTASSIUM CHLORIDE, SODIUM LACTATE AND CALCIUM CHLORIDE 1000 ML: 600; 310; 30; 20 INJECTION, SOLUTION INTRAVENOUS at 15:30

## 2021-04-09 RX ADMIN — Medication 12 ML/HR: at 21:53

## 2021-04-09 RX ADMIN — MISOPROSTOL 800 MCG: 200 TABLET ORAL at 22:45

## 2021-04-09 RX ADMIN — SODIUM CHLORIDE 2.5 MILLION UNITS: 9 INJECTION, SOLUTION INTRAVENOUS at 13:28

## 2021-04-09 RX ADMIN — IBUPROFEN 800 MG: 400 TABLET ORAL at 23:02

## 2021-04-09 RX ADMIN — FENTANYL CITRATE 100 MCG: 50 INJECTION, SOLUTION INTRAMUSCULAR; INTRAVENOUS at 14:04

## 2021-04-09 RX ADMIN — Medication 2 MILLI-UNITS/MIN: at 21:40

## 2021-04-09 RX ADMIN — ROPIVACAINE HYDROCHLORIDE 10 ML: 2 INJECTION, SOLUTION EPIDURAL; INFILTRATION at 14:04

## 2021-04-09 RX ADMIN — ONDANSETRON 4 MG: 2 INJECTION INTRAMUSCULAR; INTRAVENOUS at 21:46

## 2021-04-09 RX ADMIN — SODIUM CHLORIDE 2.5 MILLION UNITS: 9 INJECTION, SOLUTION INTRAVENOUS at 22:01

## 2021-04-09 RX ADMIN — Medication 340 ML/HR: at 22:38

## 2021-04-09 RX ADMIN — SODIUM CHLORIDE 2.5 MILLION UNITS: 9 INJECTION, SOLUTION INTRAVENOUS at 17:35

## 2021-04-09 ASSESSMENT — ACTIVITIES OF DAILY LIVING (ADL)
HEARING_DIFFICULTY_OR_DEAF: NO
DRESSING/BATHING_DIFFICULTY: NO
TOILETING_ISSUES: NO
WALKING_OR_CLIMBING_STAIRS_DIFFICULTY: NO
DIFFICULTY_COMMUNICATING: NO
CONCENTRATING,_REMEMBERING_OR_MAKING_DECISIONS_DIFFICULTY: NO
DIFFICULTY_EATING/SWALLOWING: NO
FALL_HISTORY_WITHIN_LAST_SIX_MONTHS: NO
VISION_MANAGEMENT: GLASSES
WEAR_GLASSES_OR_BLIND: YES

## 2021-04-09 NOTE — PROVIDER NOTIFICATION
04/09/21 1330 04/09/21 1352 04/09/21 1400   Epidural Placement Care   Epidural Request/Placement epidural requested anesthesiologist at bedside;patient positioned;time out performed epidural test

## 2021-04-09 NOTE — PROGRESS NOTES
[facility] Labor and Delivery Progress Note    Hansa Ortiz MRN# [id number]   Age: 25 year old YOB: 1995           Subjective:   Feeling nausea. Not feeling pain.            Objective:     Patient Vitals for the past 24 hrs:   BP Temp Temp src Resp SpO2 Oximeter Heart Rate   21 1600 104/68 -- -- -- 93 % 73 bpm   21 1530 107/61 -- -- -- 96 % 59 bpm   21 1500 116/70 98.2  F (36.8  C) Temporal -- 94 % 69 bpm   21 1430 115/70 -- -- -- 94 % 86 bpm   21 1400 122/85 -- -- -- 99 % 86 bpm   21 1330 128/73 -- -- -- -- 85 bpm   21 1205 -- -- -- -- -- 89 bpm   21 1200 123/66 99  F (37.2  C) Temporal 15 -- --   21 0835 126/70 98.2  F (36.8  C) Temporal -- -- 91 bpm         Cervical Exam: 3.5 / % / -2      Position: Mid    Membranes: Leaking     Fetal Heart Rate:    Monitor: external US    Variability: moderate (amplitude range 6 to 25 bpm)    Baseline Rate: normal range    Fetal Heart Rate Tracin bpm baseline          Assessment:   Hansa Ortiz is a 25 year old  who is 40w2d here with an induction of labor          Plan:   Able to break up scar tissue digitally. Continue to augment with oxytocin. Fetal tracing is cat II but overall reassuring with moderate variability.        Rayna Noel MD

## 2021-04-09 NOTE — PLAN OF CARE
0830:   Pt. Arrived and was admitted to unit.   Pt. Consented to monitoring of fetal heart and TOCO.   Plan of care discussed with patient. Penicillin started per GBS positive protocol. SVE performed by RN. OB will be in this afternoon to evaluate.   0931:   Pitocin started at 2 mu.

## 2021-04-09 NOTE — ANESTHESIA PROCEDURE NOTES
Epidural catheter Procedure Note  Pre-Procedure   Staff -        Anesthesiologist:  Bakari Grimm MD       Performed By: anesthesiologist       Location: OB       Pre-Anesthestic Checklist: patient identified, IV checked, site marked, risks and benefits discussed, informed consent, monitors and equipment checked, pre-op evaluation, at physician/surgeon's request and post-op pain management  Timeout:       Correct Patient: Yes        Correct Procedure: Yes        Correct Site: Yes        Correct Position: Yes   Procedure Documentation  Procedure: epidural catheter       Diagnosis: Labor Pain       Patient Position: sitting       Patient Prep/Sterile Barriers: sterile gloves, mask, patient draped       Skin prep: Betadine       Local skin infiltrated with 4 mL of 1% lidocaine.        Insertion Site: L3-4. (midline approach).       Technique: LORT saline        BHAVIN at 5 cm.       Needle Type: Mom Trustedy needle       Needle Gauge: 17.        Needle Length (Inches): 3.5        Catheter: 19 G.         Catheter threaded easily.         4 cm epidural space.         Threaded 9 cm at skin.         # of attempts: 1 and  # of redirects:  1    Assessment/Narrative         Paresthesias: No.      Test dose of 3 mL lidocaine 1.5% w/ 1:200,000 epinephrine at.         Test dose negative, 3 minutes after injection, for signs of intravascular, subdural, or intrathecal injection.       Insertion/Infusion Method: LORT saline       Aspiration negative for Heme or CSF via Epidural Catheter.    Medication(s) Administered   0.2% Ropivacaine (Epidural), 10 mL  Fentanyl PF (Epidural), 100 mcg  Medication Administration Time: 4/9/2021 2:04 PM    Comments:  Patient tolerated procedure well

## 2021-04-09 NOTE — PLAN OF CARE
1730:  Sadie at bedside. Pt. Has scar tissue from Leep procedure in 2019. Sadie performed a SVE to help release some scar tissue to progress cervical dilation.

## 2021-04-09 NOTE — PROVIDER NOTIFICATION
04/09/21 1500   Provider Notification   Provider Name/Title Sadie   Method of Notification Electronic Page   Request Evaluate - Remote   Notification Reason Decels;Labor Status;Uterine Activity;Pain;Status Update;SVE

## 2021-04-09 NOTE — H&P
[facility]    History and Physical  Obstetrics and Gynecology     Date of Admission:  2021    Assessment & Plan   Hansa Ortiz is a 25 year old female  here for an elective induction of labor.     ASSESSMENT:   IUP @ 40w2d  History of LEEP  GBS positive on PCN      PLAN:   Amniotomy performed for augmentation  Continue PCN    Rayna Noel MD    History of Present Illness   Hansa Ortiz is a 25 year old female  40w2d  Estimated Date of Delivery: 2021 is calculated from Patient's last menstrual period was 2020. [facility] of labor. She is GBS positive    PRENATAL COURSE  Prenatal course was essentially uncomplicated      Recent Labs   Lab Test 21  0916   ABO A   RH Pos   AS Neg     Rhogam not indicated   Recent Labs   Lab Test 03/15/21  1640 20   HEPBANG  --  negative   HIAGAB  --  negative   GBS Positive*  --          Prior to Admission Medications   Prior to Admission Medications   Prescriptions Last Dose Informant Patient Reported? Taking?   Prenatal Vit-Fe Fumarate-FA (PRENATAL MULTIVITAMIN W/IRON) 27-0.8 MG tablet 2021 at Unknown time  No Yes   Sig: Take 1 tablet by mouth daily   ferrous sulfate (FEROSUL) 325 (65 Fe) MG tablet 2021 at Unknown time  No Yes   Sig: Take 1 tablet (325 mg) by mouth daily (with breakfast)   vitamin C (ASCORBIC ACID) 500 MG tablet 2021 at Unknown time  No Yes   Sig: Take 1 tablet (500 mg) by mouth daily      Facility-Administered Medications: None     Allergies   No Known Allergies      Immunization History   Immunization History   Administered Date(s) Administered     Tdap (Adacel,Boostrix) 2021       Past Medical History:   Diagnosis Date     Back pain     [place] in [place] lot in [place].  Has on going mid back pain     Hx of abnormal cervical Pap smear      Trichomonas infection 2020    treated       Past Surgical History:    Procedure Laterality Date     LEEP TX, CERVICAL  01/01/2019       Vitals:    04/09/21 0835 04/09/21 1200   BP: 126/70 123/66   Resp:  15   Temp: 98.2  F (36.8  C) 99  F (37.2  C)   TempSrc: Temporal Temporal   ;    Abdomen: gravid, single vertex fetus, non-tender, EFW 6 lbs 10oz  SVE:1.5/75/-2 AROM clear fluid. Scar tissue from LEEP  FHT: 150bpm/+accels/ no decels/ mod giovanny  Constitutional: healthy, alert, active and no distress   Extremities: NT, no edema  Neurologic: Awake, alert, oriented x3  Neuropsychiatric: General: normal, calm and normal eye contact  Heart: well perfused extremities  Lungs: non-labored respirations  Rayna Noel MD

## 2021-04-09 NOTE — PLAN OF CARE
Suspicious odor of marijuana in room.  Became more intense after partner went outside.  Also bottle of tequilla was on bathroom vanity and then was visible in blue bag.  Charge nurse notified.  Tox screen was sent on pt due to earlier admitted marijuana use in pregnancy.

## 2021-04-09 NOTE — PROVIDER NOTIFICATION
04/09/21 1730   Provider Notification   Provider Name/Title Enademariannee   Method of Notification At Bedside   Notification Reason Decels;Membrane Status;Labor Status;Uterine Activity;Status Update;SVE

## 2021-04-09 NOTE — ANESTHESIA PREPROCEDURE EVALUATION
Anesthesia Pre-Procedure Evaluation    Patient: Hansa Ortiz   MRN: [id number] : 1995        Preoperative Diagnosis: * No surgery found *   Procedure :      Past Medical History:   Diagnosis Date     Back pain     [place] in [place] lot in [place].  Has on going mid back pain     Hx of abnormal cervical Pap smear      Trichomonas infection 2020    treated      Past Surgical History:   Procedure Laterality Date     LEEP TX, CERVICAL  2019      No Known Allergies   Social History     Tobacco Use     Smoking status: Never Smoker     Smokeless tobacco: Never Used   Substance Use Topics     Alcohol use: Not Currently      Wt Readings from Last 1 Encounters:   21 83.5 kg (184 lb)        Anesthesia Evaluation            ROS/MED HX  ENT/Pulmonary:    (-) asthma   Neurologic:  - neg neurologic ROS     Cardiovascular:    (-) PIH   METS/Exercise Tolerance:     Hematologic:     (+) no anticoagulation therapy, no coagulopathy,     Musculoskeletal:       GI/Hepatic:     (+) GERD,     Renal/Genitourinary:       Endo:       Psychiatric/Substance Use:       Infectious Disease:       Malignancy:       Other:            Physical Exam    Airway        Mallampati: II   TM distance: > 3 FB   Neck ROM: full     Respiratory Devices and Support         Dental  no notable dental history         Cardiovascular   cardiovascular exam normal          Pulmonary   pulmonary exam normal                OUTSIDE LABS:  CBC:   Lab Results   Component Value Date    WBC 7.9 2021    WBC 8.7 2021    HGB 10.4 (L) 2021    HGB 11.0 (L) 2021    HCT 32.0 (L) 2021    HCT 33.2 (L) 2021     2021     2021     BMP:   Lab Results   Component Value Date     2020     2007    POTASSIUM 3.4 2020    POTASSIUM 3.4 2007    CHLORIDE 109 2020    CHLORIDE 105 2007    CO2 23 2020    CO2 19 (L) 2007    BUN 13 2020     BUN 9 12/27/2007    CR 0.59 05/09/2020    CR 0.58 (L) 12/27/2007     (H) 05/09/2020     (H) 12/27/2007     COAGS: No results found for: PTT, INR, FIBR  POC:   Lab Results   Component Value Date    HCG Negative 12/27/2007     HEPATIC:   Lab Results   Component Value Date    ALBUMIN 4.0 05/09/2020    PROTTOTAL 8.2 05/09/2020    ALT 45 05/09/2020    AST 29 05/09/2020    ALKPHOS 49 05/09/2020    BILITOTAL 0.6 05/09/2020     OTHER:   Lab Results   Component Value Date    A1C 5.2 08/25/2020    SUMAN 8.4 (L) 05/09/2020    LIPASE 48 (L) 05/09/2020       Anesthesia Plan    ASA Status:  2      Anesthesia Type: Epidural.              Consents    Anesthesia Plan(s) and associated risks, benefits, and realistic alternatives discussed. Questions answered and patient/representative(s) expressed understanding.     - Discussed with:  Patient         Postoperative Care            Comments:    Orders to manage the epidural infusion have been entered, and through coordination with the nurse, we will continute to manage and monitor the patient's labor epidural.  We will continuously be available to adjust as needed thruout the entire L&D process.             Bakari Grimm MD

## 2021-04-10 LAB — HGB BLD-MCNC: 9.4 G/DL (ref 11.7–15.7)

## 2021-04-10 PROCEDURE — 36415 COLL VENOUS BLD VENIPUNCTURE: CPT | Performed by: OBSTETRICS & GYNECOLOGY

## 2021-04-10 PROCEDURE — 250N000011 HC RX IP 250 OP 636: Performed by: OBSTETRICS & GYNECOLOGY

## 2021-04-10 PROCEDURE — 90707 MMR VACCINE SC: CPT | Performed by: OBSTETRICS & GYNECOLOGY

## 2021-04-10 PROCEDURE — 85018 HEMOGLOBIN: CPT | Performed by: OBSTETRICS & GYNECOLOGY

## 2021-04-10 PROCEDURE — 120N000012 HC R&B POSTPARTUM

## 2021-04-10 PROCEDURE — 90471 IMMUNIZATION ADMIN: CPT | Performed by: OBSTETRICS & GYNECOLOGY

## 2021-04-10 PROCEDURE — 250N000013 HC RX MED GY IP 250 OP 250 PS 637: Performed by: OBSTETRICS & GYNECOLOGY

## 2021-04-10 RX ADMIN — ACETAMINOPHEN 650 MG: 325 TABLET, FILM COATED ORAL at 05:03

## 2021-04-10 RX ADMIN — ACETAMINOPHEN 650 MG: 325 TABLET, FILM COATED ORAL at 20:54

## 2021-04-10 RX ADMIN — PRENATAL VITAMINS-IRON FUMARATE 27 MG IRON-FOLIC ACID 0.8 MG TABLET 1 TABLET: at 10:49

## 2021-04-10 RX ADMIN — ACETAMINOPHEN 650 MG: 325 TABLET, FILM COATED ORAL at 15:17

## 2021-04-10 RX ADMIN — FERROUS SULFATE TAB 325 MG (65 MG ELEMENTAL FE) 325 MG: 325 (65 FE) TAB at 10:47

## 2021-04-10 RX ADMIN — MEASLES, MUMPS, AND RUBELLA VIRUS VACCINE LIVE 0.5 ML: 1000; 12500; 1000 INJECTION, POWDER, LYOPHILIZED, FOR SUSPENSION SUBCUTANEOUS at 20:55

## 2021-04-10 RX ADMIN — IBUPROFEN 800 MG: 400 TABLET ORAL at 17:05

## 2021-04-10 RX ADMIN — IBUPROFEN 800 MG: 400 TABLET ORAL at 10:47

## 2021-04-10 RX ADMIN — SENNOSIDES AND DOCUSATE SODIUM 1 TABLET: 8.6; 5 TABLET ORAL at 20:54

## 2021-04-10 RX ADMIN — OXYCODONE HYDROCHLORIDE AND ACETAMINOPHEN 500 MG: 500 TABLET ORAL at 10:47

## 2021-04-10 RX ADMIN — SENNOSIDES AND DOCUSATE SODIUM 1 TABLET: 8.6; 5 TABLET ORAL at 10:46

## 2021-04-10 RX ADMIN — ACETAMINOPHEN 650 MG: 325 TABLET, FILM COATED ORAL at 10:47

## 2021-04-10 RX ADMIN — IBUPROFEN 800 MG: 400 TABLET ORAL at 22:38

## 2021-04-10 RX ADMIN — IBUPROFEN 800 MG: 400 TABLET ORAL at 05:03

## 2021-04-10 NOTE — PROGRESS NOTES
"[facility] Obstetrics Post-Partum Progress Note          Assessment and Plan:    Assessment:   Post-partum day #1  Vacuum Assisted Vaginal Delivery  L&D complications: Fetal distress      Doing well.  Anemia present prior to delivery  No excessive bleeding  Pain well-controlled.      Plan:   Anticipate discharge tomorrow on oral iron           Interval History:   Doing well overall. Having some vaginal soreness near periurethral stitches          Significant Problems:    None          Review of Systems:    The Review of Systems is negative other than noted in the HPI          Medications:     Current Facility-Administered Medications   Medication     acetaminophen (TYLENOL) tablet 650 mg     [START ON 4/11/2021] bisacodyl (DULCOLAX) Suppository 10 mg     carboprost (HEMABATE) injection 250 mcg     carboprost (HEMABATE) injection 250 mcg     ferrous sulfate (FEROSUL) tablet 325 mg     hydrocortisone 2.5 % cream     ibuprofen (ADVIL/MOTRIN) tablet 800 mg     lactated ringers BOLUS 1,000 mL     lanolin cream     [START ON 4/11/2021] magnesium hydroxide (MILK OF MAGNESIA) suspension 30 mL     Measles, Mumps & Rubella Vac (MMR) injection 0.5 mL     Medication Instructions: misoprostol (CYTOTEC)- Nurse to discuss ordering with provider, if needed. Ordered via \"OB misoprostol (CYTOTEC) Postpartum Hemorrhage PANEL\"     methylergonovine (METHERGINE) injection 200 mcg     methylergonovine (METHERGINE) injection 200 mcg     metoclopramide (REGLAN) injection 10 mg     misoprostol (CYTOTEC) tablet 800 mcg     naloxone (NARCAN) injection 0.2 mg    Or     naloxone (NARCAN) injection 0.4 mg    Or     naloxone (NARCAN) injection 0.2 mg    Or     naloxone (NARCAN) injection 0.4 mg     NO Rho (D) immune globulin (RhoGam) needed - mother Rh POSITIVE     No Tdap Needed - Assessment: Patient does not need Tdap vaccine     ondansetron (ZOFRAN) injection 4 mg     Opioid plan postpartum - medication instruction     Opioid " plan postpartum - medication instruction     oxyCODONE (ROXICODONE) tablet 5 mg     oxytocin (PITOCIN) 30 units in 500 mL 0.9% NaCl infusion     oxytocin (PITOCIN) 30 units in 500 mL 0.9% NaCl infusion     oxytocin (PITOCIN) injection 10 Units     prenatal multivitamin w/iron per tablet 1 tablet     prochlorperazine (COMPAZINE) injection 10 mg     senna-docusate (SENOKOT-S/PERICOLACE) 8.6-50 MG per tablet 1 tablet    Or     senna-docusate (SENOKOT-S/PERICOLACE) 8.6-50 MG per tablet 2 tablet     [START ON 4/11/2021] sodium phosphate (FLEET ENEMA) 1 enema     tranexamic acid 1 g in 100 mL 0.7% NaCl IV bag (premix)     vitamin C (ASCORBIC ACID) tablet 500 mg             Physical Exam:     Patient Vitals for the past 24 hrs:   BP Temp Temp src Pulse Resp SpO2   04/10/21 0503 -- -- -- -- 16 --   04/10/21 0400 118/66 98.4  F (36.9  C) Oral 83 16 --   04/10/21 0130 117/71 98.8  F (37.1  C) Oral 82 16 --   04/10/21 0040 138/78 -- -- -- -- --   04/10/21 0025 128/68 -- -- -- -- --   04/10/21 0010 134/63 -- -- -- -- --   04/09/21 2350 133/66 -- -- -- -- --   04/09/21 2335 136/70 -- -- -- -- --   04/09/21 2320 (!) 152/78 -- -- -- -- --   04/09/21 2305 (!) 142/69 -- -- -- -- --   04/09/21 2250 130/58 -- -- -- -- --   04/09/21 2215 133/60 -- -- -- -- 98 %   04/09/21 2200 123/58 98.7  F (37.1  C) Temporal -- -- --   04/09/21 2145 133/76 -- -- -- -- --   04/09/21 2100 132/72 98  F (36.7  C) -- -- -- --   04/09/21 2015 131/69 -- -- -- -- 96 %   04/09/21 1955 115/72 -- -- -- -- 96 %   04/09/21 1953 -- 98.2  F (36.8  C) Temporal -- -- --   04/09/21 1945 -- -- -- -- -- 94 %   04/09/21 1941 118/75 -- -- -- -- --   04/09/21 1926 106/68 -- -- -- -- --   04/09/21 1911 105/74 -- -- -- -- --   04/09/21 1830 -- 98.3  F (36.8  C) Temporal -- -- 95 %   04/09/21 1800 94/52 -- -- -- -- 96 %   04/09/21 1730 111/61 97.7  F (36.5  C) Temporal -- -- 92 %   04/09/21 1715 112/75 -- -- -- -- 95 %   04/09/21 1700 112/78 -- -- -- -- 94 %   04/09/21 1630  -- 98  F (36.7  C) Temporal -- -- --   04/09/21 1600 104/68 -- -- -- -- 93 %   04/09/21 1530 107/61 -- -- -- -- 96 %   04/09/21 1500 116/70 98.2  F (36.8  C) Temporal -- -- 94 %   04/09/21 1430 115/70 -- -- -- -- 94 %   04/09/21 1400 122/85 -- -- -- -- 99 %   04/09/21 1330 128/73 -- -- -- -- --   04/09/21 1200 123/66 99  F (37.2  C) Temporal -- 15 --     GEN: NAD  GI: soft, fundus firm at the level of the umbilicus  Ext: non-tender          Data:     Hemoglobin   Date Value Ref Range Status   04/10/2021 9.4 (L) 11.7 - 15.7 g/dL Final   04/09/2021 10.4 (L) 11.7 - 15.7 g/dL Final   01/22/2021 11.0 (L) 11.7 - 15.7 g/dL Final   05/09/2020 12.2 11.7 - 15.7 g/dL Final   12/27/2007 12.6 11.7 - 15.7 g/dL Final     -    Rayna Noel MD

## 2021-04-10 NOTE — ANESTHESIA POSTPROCEDURE EVALUATION
Patient: Hansa Ortiz    * No procedures listed *    Diagnosis:* No pre-op diagnosis entered *  Diagnosis Additional Information: No value filed.    Anesthesia Type:  No value filed.    Note:  Disposition: Inpatient   Postop Pain Control: Uneventful   PONV: No   Neuro/Psych: Uneventful            Sign Out: Acceptable/Baseline neuro status   Airway/Respiratory: Uneventful            Sign Out: Acceptable/Baseline resp. status   CV/Hemodynamics: Uneventful            Sign Out: Acceptable CV status   Other NRE: NONE   DID A NON-ROUTINE EVENT OCCUR? No    Event details/Postop Comments:  Patient reports tenderness at the sight of the epidural placement.  No shooting pain.      Patient also reports some numbness on the inside of her left foot.  She has good strength and good movement.  She said that side of her epidural was very strong.      Patient instructed to monitor symptoms closely.  If she has worsening numbness, worsening back pain, or starts developing weakness she will contact us immediately.           Last vitals:  Vitals:    04/10/21 0503 04/10/21 0900 04/10/21 1535   BP:  114/71 118/77   Pulse:  83 55   Resp: 16 16 16   Temp:  36.8  C (98.3  F) 36.7  C (98  F)   SpO2:          Last vitals prior to Anesthesia Care Transfer:      Electronically Signed By: Beba Love  April 10, 2021  4:40 PM

## 2021-04-10 NOTE — PLAN OF CARE
Vital signs stable. Postpartum assessment WDL. Pain controlled with ibuprofen and tylenol, as well as hot packs and encouraging to void for cramps. Patient voiding without difficulty.  Baby is formula feeding. Patient and infant bonding well. Will continue with current plan of care.

## 2021-04-10 NOTE — PROVIDER NOTIFICATION
04/09/21 1955   Provider Notification   Provider Name/Title Dr. Noel   Method of Notification At Bedside   Request Evaluate in Person   Notification Reason SVE    Patient completely dilated per provider. Will labor patient down for an hour.

## 2021-04-10 NOTE — PROGRESS NOTES
[facility] Labor and Delivery Progress Note    Hansa Ortiz MRN# [id number]   Age: 25 year old YOB: 1995           Subjective:   Called in for deep variable decelerations. 7 cm at the time. Pitocin turned off at 191           Objective:     Patient Vitals for the past 24 hrs:   BP Temp Temp src Resp SpO2 Oximeter Heart Rate   21 115/72 -- -- -- 96 % 82 bpm   21 -- 98.2  F (36.8  C) Temporal -- -- --   21 -- -- -- -- 94 % 74 bpm   21 118/75 -- -- -- -- 72 bpm   21 1926 106/68 -- -- -- -- 82 bpm   21 1911 105/74 -- -- -- -- 78 bpm   21 1830 -- 98.3  F (36.8  C) Temporal -- 95 % 70 bpm   21 1800 94/52 -- -- -- 96 % 69 bpm   21 1730 111/61 97.7  F (36.5  C) Temporal -- 92 % --   21 1715 112/75 -- -- -- 95 % --   21 1700 112/78 -- -- -- 94 % 71 bpm   21 1630 -- 98  F (36.7  C) Temporal -- -- --   21 1600 104/68 -- -- -- 93 % 73 bpm   21 1530 107/61 -- -- -- 96 % 59 bpm   21 1500 116/70 98.2  F (36.8  C) Temporal -- 94 % 69 bpm   21 1430 115/70 -- -- -- 94 % 86 bpm   21 1400 122/85 -- -- -- 99 % 86 bpm   21 1330 128/73 -- -- -- -- 85 bpm   21 1205 -- -- -- -- -- 89 bpm   21 1200 123/66 99  F (37.2  C) Temporal 15 -- --   21 0835 126/70 98.2  F (36.8  C) Temporal -- -- 91 bpm         Cervical Exam: 10 / % / 0         Membranes: Leaking     Fetal Heart Rate:    Monitor: external US    Variability: moderate (amplitude range 6 to 25 bpm)    Baseline Rate: normal range    Fetal Heart Rate Tracin bpm / early decelerations. Intermittent variable decelerations.          Assessment:   Hansa Ortiz is a 25 year old  who is 40w2d here with elective induction of labor due to pre-hypertensive blood pressures          Plan:   Labor down for one hour then start pushing. Consider restarting pitocin during pushing if needed.      Rayna Noel  MD

## 2021-04-10 NOTE — PLAN OF CARE
Vital signs stable. Postpartum assessment WDL. Pain controlled with tylenol and motrin  Patient  still have slight tingling and numbness in left feet  stand by assist voiding without difficulty. Bottle feeding baby . Patient and infant bonding well. Will continue with current plan of care.

## 2021-04-10 NOTE — L&D DELIVERY NOTE
Delivery Summary    Hansa Ortiz MRN# [id number]   Age: 25 year old YOB: 1995     ASSESSMENT & PLAN: Hansa is a 24 yo  admitted at 40+2wga for an elective induction of labor due to prehypertensive blood pressures in the office of 130s/80s. She was induced with oxytocin and amniotomy. Digital loosening of the scar tissue from her prior LEEP was done after her epidural was in place. She experienced intermittent variable decelerations and prolonged decelerations. She progressed to complete dilation 6 hours and 40 minutes after ruptured membranes. She was placed in a laboring down position but after having a prolonged deceleration she was made to start pushing. She pushed mostly on hands and knees for 2 hours and 49 minutes to deliver a viable male infant in KERI presentation via vacuum assist. See details below. Apgars 7, 9. Weight 8 lbs 11 ounces.    Vacuum Delivery: Hansa had fetal bradycardia to 60bpm after cat II tracing throughout pushing. She was verbally consented for a vacuum assisted vaginal delivery. The risks were discussed as well as alternative delivery strategies. The bladder was emptied and the fetal position was noted to be in the left occiput presentation. The Kiwi was placed anterior to the posterior fontanelle. With excellent maternal effort she was able to deliver the fetal head. The nuchal cord was reduced. The anterior and posterior shoulders delivered easily. The vacuum was removed and the infant was placed on the maternal abdomen and immediately evaluated by the  ICU team. She sustained bilateral periurethral lacerations that was repaired with 3-0 vicryl. She had a large gush of fluid after the placenta delivered and was treated with rectal misoprostol. Her estimated blood loss is 300 ml.       Kelli Ortiz [[id number]]    Labor Event Times    Labor onset date: 21 Onset time:  2:00 PM   Dilation complete date: 21 Complete time:  8:40 PM   Start  pushing date/time: 2021 204      Labor Events     labor?: No   steroids: None  Labor Type: AROM, Induction/Cervical ripening  Predominate monitoring during 1st stage: continuous electronic fetal monitoring     Antibiotics received during labor?: Yes  Reason for Antibiotics: GBS  Antibiotics received for GBS: Penicillin  Antibiotics Given (GBS): Greater than 4 hours prior to delivery     Rupture date/time: 21 1305   Rupture type: Artificial Rupture of Membranes  Fluid color: Clear     Induction: Oxytocin  Induction date/time: 21 0931   Cervical ripening date/time:     Indications for induction: Elective     Augmentation: AROM  Indications for augmentation: Ineffective Contraction Pattern  1:1 continuous labor support provided by?: RN       Delivery/Placenta Date and Time    Delivery Date: 21 Delivery Time: 10:36 PM           Apgars    Living status: Living   1 Minute 5 Minute 10 Minute 15 Minute 20 Minute   Skin color: 0  1       Heart rate: 2  2       Reflex irritability: 2  2       Muscle tone: 1  2       Respiratory effort: 2  2       Total: 7  9       Apgars assigned by: PHYLLIS WORKMAN CNP     Cord    Cord Complications: Nuchal   Nuchal Intervention: reduced         Nuchal cord description: tight nuchal cord         Stem cell collection?: No        Resuscitation    Output in Delivery Room: Stool      Measurements    Weight: 8 lb 11.3 oz    Output in delivery room: Stool     Labor Events and Shoulder Dystocia    Fetal Tracing Prior to Delivery: Category 2  Fetal Tracing Comments: moderate variability. +accels. Terminal bradycardia  Shoulder dystocia present?: Neg     Delivery (Maternal) (Provider to Complete) ([id number])    Episiotomy: None  Perineal lacerations: None    Periurethral laceration: bilateral Repaired?: Yes   Repair suture: 3-0 Vicryl  Genital tract inspection done: Pos     Blood Loss  Mother: Hansa Ortiz S #[id number]   Start of Mother's Information     IO Blood Loss  21 1400 - 21 7667    None           End of Mother's Information  Mother: Hansa Ortiz S #[id number]          Delivery - Provider to Complete ([id number])    Attempted Delivery Types (Choose all that apply): Vacuum (Extractor)  Delivery Type (Choose the 1 that will go to the Birth History): Vaginal, Vacuum (Extractor)    Verbal Informed Consent Obtained For Vacuum: Yes Alternate Labor Strategies Discussed (vacuum): Yes    Emergency Resources Available (vacuum): Charge Nurse/Team, Resuscitation Team   Type (vacuum): Kiwi Accrued Pulling Time (vacuum): 50 seconds      # of Pop-Offs (vacuum): 0 # of Pulls/Contractions (vacuum): 2   Position (vacuum): OA Fetal Station (vacuum): +2      Indication for Operative Vaginal Delivery (vacuum): Fetal Status   Operative Vaginal Delivery Brief Note Vacuum: Hansa had fetal bradycardia to 60bpm after cat II tracing throughout pushing. She was verbally consented for a vacuum assisted vaginal delivery. The risks were discussed as well as alternative delivery strategies. The bladder was emptied and the fetal position was noted to be in the left occiput presentation. The Kiwi was placed anterior to the posterior fontanelle. With excellent maternal effort she was able to deliver the fetal head. The nuchal cord was reduced. The anterior and posterior shoulders delivered easily. The vacuum was removed and the infant was placed on the maternal abdomen and immediately evaluated by the  ICU team. She sustained bilateral periurethral lacerations that was repaired with 3-0 vicryl. She had a large gush of fluid after the placenta delivered and was treated with rectal misoprostol. Her estimated blood loss is 300 ml.                          Placenta    Removal: Spontaneous  Disposition: Hospital disposal           Anesthesia    Method: Epidural                Presentation and Position    Presentation: Vertex    Position: Left Occiput Anterior                  Rayna Noel MD

## 2021-04-10 NOTE — PLAN OF CARE
Up to floor at 0000 oriented to room /unit resources folder given infant safety reviewed questions answered pain  well controlled call light at bed side  encourage to call with needs continue to monitor

## 2021-04-11 VITALS
TEMPERATURE: 98 F | DIASTOLIC BLOOD PRESSURE: 85 MMHG | RESPIRATION RATE: 16 BRPM | HEART RATE: 69 BPM | SYSTOLIC BLOOD PRESSURE: 116 MMHG | OXYGEN SATURATION: 98 %

## 2021-04-11 PROCEDURE — 250N000013 HC RX MED GY IP 250 OP 250 PS 637: Performed by: OBSTETRICS & GYNECOLOGY

## 2021-04-11 RX ORDER — ACETAMINOPHEN 325 MG/1
650 TABLET ORAL EVERY 4 HOURS PRN
Qty: 60 TABLET | Refills: 0 | Status: ON HOLD | OUTPATIENT
Start: 2021-04-11 | End: 2022-09-07

## 2021-04-11 RX ORDER — IBUPROFEN 800 MG/1
800 TABLET, FILM COATED ORAL EVERY 6 HOURS PRN
Qty: 90 TABLET | Refills: 0 | Status: SHIPPED | OUTPATIENT
Start: 2021-04-11 | End: 2022-06-15

## 2021-04-11 RX ORDER — CYCLOBENZAPRINE HCL 10 MG
10 TABLET ORAL 3 TIMES DAILY PRN
Qty: 60 TABLET | Refills: 0 | Status: SHIPPED | OUTPATIENT
Start: 2021-04-11 | End: 2021-04-27

## 2021-04-11 RX ADMIN — FERROUS SULFATE TAB 325 MG (65 MG ELEMENTAL FE) 325 MG: 325 (65 FE) TAB at 07:38

## 2021-04-11 RX ADMIN — IBUPROFEN 800 MG: 400 TABLET ORAL at 05:06

## 2021-04-11 RX ADMIN — ACETAMINOPHEN 650 MG: 325 TABLET, FILM COATED ORAL at 12:06

## 2021-04-11 RX ADMIN — IBUPROFEN 800 MG: 400 TABLET ORAL at 12:06

## 2021-04-11 RX ADMIN — SENNOSIDES AND DOCUSATE SODIUM 1 TABLET: 8.6; 5 TABLET ORAL at 07:37

## 2021-04-11 RX ADMIN — ACETAMINOPHEN 650 MG: 325 TABLET, FILM COATED ORAL at 05:05

## 2021-04-11 RX ADMIN — OXYCODONE HYDROCHLORIDE 5 MG: 5 TABLET ORAL at 07:38

## 2021-04-11 RX ADMIN — OXYCODONE HYDROCHLORIDE AND ACETAMINOPHEN 500 MG: 500 TABLET ORAL at 07:38

## 2021-04-11 RX ADMIN — OXYCODONE HYDROCHLORIDE 5 MG: 5 TABLET ORAL at 03:14

## 2021-04-11 RX ADMIN — PRENATAL VITAMINS-IRON FUMARATE 27 MG IRON-FOLIC ACID 0.8 MG TABLET 1 TABLET: at 07:38

## 2021-04-11 RX ADMIN — ACETAMINOPHEN 650 MG: 325 TABLET, FILM COATED ORAL at 01:24

## 2021-04-11 NOTE — PLAN OF CARE
Hansa reports low back pain and uterine cramping 7/10. Decreased with oxycodone and heating pad. Pt continues to report numbness on bottom of right foot. No weakness noted, steady gait. Anesthesia aware. Pt bottle feeding infant, tolerating well. Plans to discharge  today.

## 2021-04-11 NOTE — PLAN OF CARE
VSS.Ibuprofen & tylenol providing adequate pain control for perineal discomfort. Also using ice and tucks pads. C/O abdominal cramping and low back pain. Aqua K pad given to pt and is helping. Also c/o slight numbness in left ankle but able to ambulate independently.Catrina. Reg diet. Voiding.Scored 9 on depression screen.  Bottle feeding formula to .

## 2021-04-11 NOTE — PROGRESS NOTES
"[facility] Obstetrics Post-Partum Progress Note          Assessment and Plan:    Assessment:   Post-partum day #2  Vacuum extraction  L&D complications: Fetal bradycardia      Doing well.  Anemia not from hemorrhage but present prior to delivery: continue home oral iron  Lower back pain and numbness in right foot is due to her epidural and prolonged pushing. Normal strength in her feet bilaterally.       Plan:   Discharge today  Get Flexeril from [facility]  Follow up in 2 weeks for mood check and also for neurological symptoms           Interval History:   Doing better. Right foot numbness and lower back pain          Significant Problems:    None          Review of Systems:    The Review of Systems is negative other than noted in the HPI          Medications:     Current Facility-Administered Medications   Medication     acetaminophen (TYLENOL) tablet 650 mg     bisacodyl (DULCOLAX) Suppository 10 mg     carboprost (HEMABATE) injection 250 mcg     carboprost (HEMABATE) injection 250 mcg     ferrous sulfate (FEROSUL) tablet 325 mg     hydrocortisone 2.5 % cream     ibuprofen (ADVIL/MOTRIN) tablet 800 mg     lactated ringers BOLUS 1,000 mL     lanolin cream     magnesium hydroxide (MILK OF MAGNESIA) suspension 30 mL     Medication Instructions: misoprostol (CYTOTEC)- Nurse to discuss ordering with provider, if needed. Ordered via \"OB misoprostol (CYTOTEC) Postpartum Hemorrhage PANEL\"     methylergonovine (METHERGINE) injection 200 mcg     methylergonovine (METHERGINE) injection 200 mcg     metoclopramide (REGLAN) injection 10 mg     misoprostol (CYTOTEC) tablet 800 mcg     naloxone (NARCAN) injection 0.2 mg    Or     naloxone (NARCAN) injection 0.4 mg    Or     naloxone (NARCAN) injection 0.2 mg    Or     naloxone (NARCAN) injection 0.4 mg     NO Rho (D) immune globulin (RhoGam) needed - mother Rh POSITIVE     No Tdap Needed - Assessment: Patient does not need Tdap vaccine     ondansetron (ZOFRAN) " injection 4 mg     Opioid plan postpartum - medication instruction     Opioid plan postpartum - medication instruction     oxyCODONE (ROXICODONE) tablet 5 mg     oxytocin (PITOCIN) 30 units in 500 mL 0.9% NaCl infusion     oxytocin (PITOCIN) 30 units in 500 mL 0.9% NaCl infusion     oxytocin (PITOCIN) injection 10 Units     prenatal multivitamin w/iron per tablet 1 tablet     prochlorperazine (COMPAZINE) injection 10 mg     senna-docusate (SENOKOT-S/PERICOLACE) 8.6-50 MG per tablet 1 tablet    Or     senna-docusate (SENOKOT-S/PERICOLACE) 8.6-50 MG per tablet 2 tablet     sodium phosphate (FLEET ENEMA) 1 enema     tranexamic acid 1 g in 100 mL 0.7% NaCl IV bag (premix)     vitamin C (ASCORBIC ACID) tablet 500 mg             Physical Exam:     Patient Vitals for the past 24 hrs:   BP Temp Temp src Pulse Resp   04/11/21 0756 116/85 98  F (36.7  C) Oral 69 16   04/11/21 0000 109/72 98.1  F (36.7  C) Oral 69 18   04/10/21 1535 118/77 98  F (36.7  C) Oral 55 16     Uterine fundus is firm, non-tender and at below the level of the umbilicus  MSK: epidural site non-tender. Bilateral tenderness at the level of her iliac crests  Normal strength in her feet bilaterally          Data:     Hemoglobin   Date Value Ref Range Status   04/10/2021 9.4 (L) 11.7 - 15.7 g/dL Final   04/09/2021 10.4 (L) 11.7 - 15.7 g/dL Final   01/22/2021 11.0 (L) 11.7 - 15.7 g/dL Final   05/09/2020 12.2 11.7 - 15.7 g/dL Final   12/27/2007 12.6 11.7 - 15.7 g/dL Final     -    Rayna Noel MD

## 2021-04-11 NOTE — PLAN OF CARE
Upon entering room, infant was found alone in chair. Educated parents on importance of keeping infant in bassinet on back at all times when not holding infant.

## 2021-04-11 NOTE — PLAN OF CARE
D: VSS, assessments WDL.   I: Pt. received complete discharge paperwork and home medications as filled by discharge pharmacy. Additional rx sent to home pharmacy; pt plans to stop on her way home.  Pt. was given times of last dose for all discharge medications in writing on discharge medication sheets.  Discharge teaching included home medication, pain management, activity restrictions, postpartum cares, and signs and symptoms of infection.    A: Discharge outcomes on care plan met.  Mother states understanding and comfort with self care and follow up care.   P: Pt. Discharged.  Pt. was accompanied by FOB and  and left with personal belongings.  Home care referral sent.  Pt. to follow up with OB provider per discharge instructions.  Pt. had no further questions at the time of discharge and no unmet needs were identified.

## 2021-04-11 NOTE — PLAN OF CARE
Vital signs stable. Postpartum assessment WDL. Uterine fundus is firm and midline. Pt reports abdominal cramping, tingling in left foot, and back pain. Was seen by anesthesia in evening. Started taking Oxy for increasing pain throughout night. Pt is also taking Tylenol, Ibu, and using a heating pad. Up and ambulating; free of dizziness. Breastfeeding well. Questions/concerns addressed.

## 2021-04-26 NOTE — PROGRESS NOTES
SUBJECTIVE:                                                   Hansa Ortiz is a 25 year old female who presents to clinic today for the following health issue(s):  Patient presents with:  Follow Up: Patient states if she sits too long she would have pinched nerve feeling in her toes. It's making progress from before.       Additional information: Patient delivered on 2021 to a baby boy.     HPI:  Doing better from a neuropathy perspective. Only two toes are numb. Able to walk for the most part without any difficulty. Reports a good mood. She has noted discharge that has a fishy odor.    Patient's last menstrual period was 2020..     Patient is not sexually active, .  Using none for contraception.    reports that she has never smoked. She has never used smokeless tobacco.    STD testing offered?  Declined    Health maintenance updated:  yes    Today's PHQ-2 Score: No flowsheet data found.  Today's PHQ-9 Score:   PHQ-9 SCORE 2021   PHQ-9 Total Score 0     Today's KIRTI-7 Score:   KIRTI-7 SCORE 2021   Total Score 0       Problem list and histories reviewed & adjusted, as indicated.  Additional history: as documented.    Patient Active Problem List   Diagnosis     Pregnancy, supervision, high-risk     Encounter for triage in pregnant patient     Indication for care in labor or delivery     Supervision of high risk pregnancy in third trimester     Past Surgical History:   Procedure Laterality Date     LEEP TX, CERVICAL  2019      Social History     Tobacco Use     Smoking status: Never Smoker     Smokeless tobacco: Never Used   Substance Use Topics     Alcohol use: Not Currently      Problem (# of Occurrences) Relation (Name,Age of Onset)    Clotting Disorder (1) Mother            Current Outpatient Medications   Medication Sig     acetaminophen (TYLENOL) 325 MG tablet Take 2 tablets (650 mg) by mouth every 4 hours as needed for mild pain     ferrous sulfate (FEROSUL) 325 (65 Fe) MG  "tablet Take 1 tablet (325 mg) by mouth daily (with breakfast)     ibuprofen (ADVIL/MOTRIN) 800 MG tablet Take 1 tablet (800 mg) by mouth every 6 hours as needed for other (cramping)     Prenatal Vit-Fe Fumarate-FA (PRENATAL MULTIVITAMIN W/IRON) 27-0.8 MG tablet Take 1 tablet by mouth daily     vitamin C (ASCORBIC ACID) 500 MG tablet Take 1 tablet (500 mg) by mouth daily     No current facility-administered medications for this visit.      No Known Allergies    ROS:  12 point review of systems negative other than symptoms noted below or in the HPI.  No urinary frequency or dysuria, bladder or kidney problems      OBJECTIVE:     /60 (BP Location: Right arm, Patient Position: Sitting, Cuff Size: Adult Regular)   Pulse 96   Ht 1.619 m (5' 3.75\")   Wt 74.8 kg (165 lb)   LMP 07/01/2020   Breastfeeding No   BMI 28.54 kg/m    Body mass index is 28.54 kg/m .    Exam:  Constitutional:  Appearance: Well nourished, well developed alert, in no acute distress  Skin: General Inspection:  No rashes present, no lesions present, no areas of discoloration.  Neurologic:  Mental Status:  Oriented X3.  Normal strength and tone, sensory exam grossly normal, mentation intact and speech normal.    Psychiatric:  Mentation appears normal and affect normal/bright.   GI: soft, non-tender to palpation  : deferred. No perineal stitches.    In-Clinic Test Results:  No results found for this or any previous visit (from the past 24 hour(s)).    ASSESSMENT/PLAN:                                                        ICD-10-CM    1. Routine postpartum follow-up  Z39.2        Continued observation for now as her symptoms are improving. No concern for postpartum depression. Will give a course of flagyl for maldorous discharge. No concern for endometritis.    Rayna Noel MD  [facility] [place]  "

## 2021-04-27 ENCOUNTER — OFFICE VISIT (OUTPATIENT)
Dept: OBGYN | Facility: CLINIC | Age: 26
End: 2021-04-27
Payer: COMMERCIAL

## 2021-04-27 VITALS
SYSTOLIC BLOOD PRESSURE: 106 MMHG | BODY MASS INDEX: 28.17 KG/M2 | HEART RATE: 96 BPM | WEIGHT: 165 LBS | DIASTOLIC BLOOD PRESSURE: 60 MMHG | HEIGHT: 64 IN

## 2021-04-27 DIAGNOSIS — N89.8 VAGINAL ODOR: ICD-10-CM

## 2021-04-27 PROCEDURE — 99024 POSTOP FOLLOW-UP VISIT: CPT | Performed by: OBSTETRICS & GYNECOLOGY

## 2021-04-27 RX ORDER — METRONIDAZOLE 500 MG/1
500 TABLET ORAL 2 TIMES DAILY
Qty: 14 TABLET | Refills: 0 | Status: SHIPPED | OUTPATIENT
Start: 2021-04-27 | End: 2021-05-04

## 2021-04-27 ASSESSMENT — ANXIETY QUESTIONNAIRES
3. WORRYING TOO MUCH ABOUT DIFFERENT THINGS: NOT AT ALL
IF YOU CHECKED OFF ANY PROBLEMS ON THIS QUESTIONNAIRE, HOW DIFFICULT HAVE THESE PROBLEMS MADE IT FOR YOU TO DO YOUR WORK, TAKE CARE OF THINGS AT HOME, OR GET ALONG WITH OTHER PEOPLE: NOT DIFFICULT AT ALL
6. BECOMING EASILY ANNOYED OR IRRITABLE: NOT AT ALL
1. FEELING NERVOUS, ANXIOUS, OR ON EDGE: NOT AT ALL
7. FEELING AFRAID AS IF SOMETHING AWFUL MIGHT HAPPEN: NOT AT ALL
5. BEING SO RESTLESS THAT IT IS HARD TO SIT STILL: NOT AT ALL
GAD7 TOTAL SCORE: 0
2. NOT BEING ABLE TO STOP OR CONTROL WORRYING: NOT AT ALL

## 2021-04-27 ASSESSMENT — PATIENT HEALTH QUESTIONNAIRE - PHQ9
5. POOR APPETITE OR OVEREATING: NOT AT ALL
SUM OF ALL RESPONSES TO PHQ QUESTIONS 1-9: 0

## 2021-04-27 ASSESSMENT — MIFFLIN-ST. JEOR: SCORE: 1464.47

## 2021-04-28 ASSESSMENT — ANXIETY QUESTIONNAIRES: GAD7 TOTAL SCORE: 0

## 2021-05-20 NOTE — PROGRESS NOTES
"SUBJECTIVE:  Hansa Ortiz,  is here for a postpartum visit.  She had a Vacuum delivery on 2021 delivering a healthy baby boy, named Chelle weighing 8 lbs 11.3 oz at term.      HPI:  Hansa presents for follow up. She is doing well overall. Her mood is reported as up and down but mostly good. Her daughter is bonding well with her son. She is interested in getting the Depo Provera for contraception. She is not breastfeeding currently. She is due for a repeat pap smear this year as follow up to her LEEP in 2019 with OSEI 2-3.    Last PHQ-9 score on record=   PHQ-9 SCORE 2021   PHQ-9 Total Score 3     KIRTI-7 SCORE 10/6/2020 2021 2021   Total Score 4 0 3       Pap:   Lab Results   Component Value Date    PAP NILM 2020        Delivery complications:  No  Breast feeding:  No  Bladder problems:  No  Bowel problems/hemorrhoids:  No  Episiotomy/laceration/incision healed? No  Vaginal flow:  None  Tehama:  Yes, prior to her last period.  Contraception: depo or other injectable  Emotional adjustment:  doing well, happy, tired and having some difficulties adjusting  Back to work:  Unsure for now    12 point review of systems negative other than symptoms noted below or in the HPI.    OBJECTIVE:  Vitals: /72 (BP Location: Right arm, Patient Position: Sitting, Cuff Size: Adult Regular)   Pulse 76   Ht 1.619 m (5' 3.75\")   Wt 76.1 kg (167 lb 12.8 oz)   LMP 2021   Breastfeeding No   BMI 29.03 kg/m    BMI= Body mass index is 29.03 kg/m .  General - pleasant female in no acute distress.  Breast -  symmetric, no skin changes, no puckering and no lymphadenopathy  Abdomen - No incision  Pelvic - EG: normal adult female, BUS: within normal limits, Vagina: well rugated, no discharge, Cervix: no lesions or CMT, Uterus: firm, normal sized and nontender, retroverted in position. Adnexae: no masses or tenderness.  Rectovaginal - deferred.    ASSESSMENT:    ICD-10-CM    1. Routine postpartum " follow-up  Z39.2    2. Screening for cervical cancer  Z12.4 Pap imaged thin layer screen with HPV - recommended age 30 - 65 years (select HPV order below)     HPV High Risk Types DNA Cervical   3. S/P LEEP of cervix  Z98.890 Pap imaged thin layer screen with HPV - recommended age 30 - 65 years (select HPV order below)     HPV High Risk Types DNA Cervical       PLAN:  May resume normal activities without restrictions.  Pap smear was done today.    Full counseling was provided, and all questions were answered.   Return to clinic in one year for an annual visit.   UPT and Depo Provera today    Rayna Noel MD

## 2021-05-25 ENCOUNTER — PRENATAL OFFICE VISIT (OUTPATIENT)
Dept: OBGYN | Facility: CLINIC | Age: 26
End: 2021-05-25
Payer: COMMERCIAL

## 2021-05-25 VITALS
DIASTOLIC BLOOD PRESSURE: 72 MMHG | HEIGHT: 64 IN | WEIGHT: 167.8 LBS | HEART RATE: 76 BPM | BODY MASS INDEX: 28.65 KG/M2 | SYSTOLIC BLOOD PRESSURE: 100 MMHG

## 2021-05-25 DIAGNOSIS — Z30.42 ENCOUNTER FOR DEPO-PROVERA CONTRACEPTION: Primary | ICD-10-CM

## 2021-05-25 DIAGNOSIS — Z12.4 SCREENING FOR CERVICAL CANCER: ICD-10-CM

## 2021-05-25 DIAGNOSIS — Z98.890 S/P LEEP OF CERVIX: ICD-10-CM

## 2021-05-25 PROBLEM — O09.90 PREGNANCY, SUPERVISION, HIGH-RISK: Status: RESOLVED | Noted: 2020-10-06 | Resolved: 2021-05-25

## 2021-05-25 LAB — HCG UR QL: NEGATIVE

## 2021-05-25 PROCEDURE — 87624 HPV HI-RISK TYP POOLED RSLT: CPT | Performed by: OBSTETRICS & GYNECOLOGY

## 2021-05-25 PROCEDURE — 81025 URINE PREGNANCY TEST: CPT | Performed by: OBSTETRICS & GYNECOLOGY

## 2021-05-25 PROCEDURE — 96372 THER/PROPH/DIAG INJ SC/IM: CPT | Performed by: OBSTETRICS & GYNECOLOGY

## 2021-05-25 PROCEDURE — G0145 SCR C/V CYTO,THINLAYER,RESCR: HCPCS | Performed by: OBSTETRICS & GYNECOLOGY

## 2021-05-25 PROCEDURE — 99207 PR POST PARTUM EXAM: CPT | Performed by: OBSTETRICS & GYNECOLOGY

## 2021-05-25 RX ORDER — MEDROXYPROGESTERONE ACETATE 150 MG/ML
150 INJECTION, SUSPENSION INTRAMUSCULAR
Status: COMPLETED | OUTPATIENT
Start: 2021-05-25 | End: 2022-01-27

## 2021-05-25 RX ADMIN — MEDROXYPROGESTERONE ACETATE 150 MG: 150 INJECTION, SUSPENSION INTRAMUSCULAR at 10:42

## 2021-05-25 SDOH — HEALTH STABILITY: MENTAL HEALTH: HOW OFTEN DO YOU HAVE 6 OR MORE DRINKS ON ONE OCCASION?: NOT ASKED

## 2021-05-25 SDOH — HEALTH STABILITY: MENTAL HEALTH: HOW OFTEN DO YOU HAVE A DRINK CONTAINING ALCOHOL?: NOT ASKED

## 2021-05-25 SDOH — HEALTH STABILITY: MENTAL HEALTH: HOW MANY STANDARD DRINKS CONTAINING ALCOHOL DO YOU HAVE ON A TYPICAL DAY?: NOT ASKED

## 2021-05-25 ASSESSMENT — PATIENT HEALTH QUESTIONNAIRE - PHQ9
5. POOR APPETITE OR OVEREATING: SEVERAL DAYS
SUM OF ALL RESPONSES TO PHQ QUESTIONS 1-9: 3

## 2021-05-25 ASSESSMENT — ANXIETY QUESTIONNAIRES
IF YOU CHECKED OFF ANY PROBLEMS ON THIS QUESTIONNAIRE, HOW DIFFICULT HAVE THESE PROBLEMS MADE IT FOR YOU TO DO YOUR WORK, TAKE CARE OF THINGS AT HOME, OR GET ALONG WITH OTHER PEOPLE: SOMEWHAT DIFFICULT
7. FEELING AFRAID AS IF SOMETHING AWFUL MIGHT HAPPEN: NOT AT ALL
3. WORRYING TOO MUCH ABOUT DIFFERENT THINGS: SEVERAL DAYS
2. NOT BEING ABLE TO STOP OR CONTROL WORRYING: SEVERAL DAYS
6. BECOMING EASILY ANNOYED OR IRRITABLE: NOT AT ALL
1. FEELING NERVOUS, ANXIOUS, OR ON EDGE: NOT AT ALL
GAD7 TOTAL SCORE: 3
5. BEING SO RESTLESS THAT IT IS HARD TO SIT STILL: NOT AT ALL

## 2021-05-25 ASSESSMENT — MIFFLIN-ST. JEOR: SCORE: 1477.17

## 2021-05-25 NOTE — NURSING NOTE
Clinic Administered Medication Documentation      Depo Provera Documentation    URINE HCG: negative    Depo-Provera Standing Order inclusion/exclusion criteria reviewed.   Patient meets: inclusion criteria     BP: 100/72  LAST PAP/EXAM:   Lab Results   Component Value Date    PAP NILM 01/14/2020       Prior to injection, verified patient identity using patient's name and date of birth. Medication was administered. Please see MAR and medication order for additional information.     Was entire vial of medication used? Yes  Vial/Syringe: Single dose vial  Expiration Date:  02/28/2023    Patient instructed to report any adverse reaction to staff immediately .  NEXT INJECTION DUE: 8/10/21 - 8/24/21  SITE: BALAJI HENRIQUEZ    NDC # 1444-0001-56  LOT # [id number]

## 2021-05-26 ASSESSMENT — ANXIETY QUESTIONNAIRES: GAD7 TOTAL SCORE: 3

## 2021-05-27 LAB
COPATH REPORT: NORMAL
PAP: NORMAL

## 2021-06-01 ENCOUNTER — PATIENT OUTREACH (OUTPATIENT)
Dept: OBGYN | Facility: CLINIC | Age: 26
End: 2021-06-01

## 2021-06-01 LAB
FINAL DIAGNOSIS: ABNORMAL
HPV HR 12 DNA CVX QL NAA+PROBE: POSITIVE
HPV16 DNA SPEC QL NAA+PROBE: NEGATIVE
HPV18 DNA SPEC QL NAA+PROBE: NEGATIVE
SPECIMEN DESCRIPTION: ABNORMAL
SPECIMEN SOURCE CVX/VAG CYTO: ABNORMAL

## 2021-06-16 NOTE — PROGRESS NOTES
SUBJECTIVE:                                                   Hansa Ortiz is a 25 year old female who presents to clinic today for the following health issue(s):  Patient presents with:  Abnormal Uterine Bleeding: Pt states that today is day 10 of bleeding. Normal bleeding just longer than she is used to. She got her depo shot at her PP visit.      HPI:  Patient had PP visit in 21.  In May had a depo-provera injection with cycle.  She has been lightly bleeding for last 10 days.  Patient denies any pelvic pain, no vaginal symptoms, no clots with it. Patient states she does need to come back for colpo with DR. Abrams for other HPV+.      Patient's last menstrual period was 2021..     Patient is sexually active, .  Using depo or other injectable for contraception.    reports that she has never smoked. She has never used smokeless tobacco.    STD testing offered?  Declined    Health maintenance updated:  yes    Today's PHQ-2 Score: No flowsheet data found.  Today's PHQ-9 Score:   PHQ-9 SCORE 2021   PHQ-9 Total Score 2     Today's KIRTI-7 Score:   KIRTI-7 SCORE 2021   Total Score 0       Problem list and histories reviewed & adjusted, as indicated.  Additional history: as documented.    Patient Active Problem List   Diagnosis     Encounter for triage in pregnant patient     Indication for care in labor or delivery     Supervision of high risk pregnancy in third trimester     OSEI III (cervical intraepithelial neoplasia III)     Past Surgical History:   Procedure Laterality Date     LEEP TX, CERVICAL  2019      Social History     Tobacco Use     Smoking status: Never Smoker     Smokeless tobacco: Never Used   Substance Use Topics     Alcohol use: Yes     Comment: wine      Problem (# of Occurrences) Relation (Name,Age of Onset)    Clotting Disorder (1) Mother            Current Outpatient Medications   Medication Sig     acetaminophen (TYLENOL) 325 MG tablet Take 2 tablets (650 mg) by  "mouth every 4 hours as needed for mild pain (Patient not taking: Reported on 6/17/2021)     ferrous sulfate (FEROSUL) 325 (65 Fe) MG tablet Take 1 tablet (325 mg) by mouth daily (with breakfast) (Patient not taking: Reported on 6/17/2021)     ibuprofen (ADVIL/MOTRIN) 800 MG tablet Take 1 tablet (800 mg) by mouth every 6 hours as needed for other (cramping) (Patient not taking: Reported on 6/17/2021)     vitamin C (ASCORBIC ACID) 500 MG tablet Take 1 tablet (500 mg) by mouth daily (Patient not taking: Reported on 6/17/2021)     Current Facility-Administered Medications   Medication     medroxyPROGESTERone (DEPO-PROVERA) injection 150 mg     No Known Allergies    ROS:  12 point review of systems negative other than symptoms noted below or in the HPI.  Genitourinary: Irregular Menses  No urinary frequency or dysuria, bladder or kidney problems      OBJECTIVE:     BP 92/56   Ht 1.619 m (5' 3.75\")   Wt 75.9 kg (167 lb 6.4 oz)   LMP 06/07/2021   Breastfeeding No   BMI 28.96 kg/m    Body mass index is 28.96 kg/m .    Exam:  Constitutional:  Appearance: Well nourished, well developed alert, in no acute distress  Neurologic:  Mental Status:  Oriented X3.  Normal strength and tone, sensory exam grossly normal, mentation intact and speech normal.    Psychiatric:  Mentation appears normal and affect normal/bright.  No Pelvic Exam performed  Patient declined.    In-Clinic Test Results:  No results found for this or any previous visit (from the past 24 hour(s)).    ASSESSMENT/PLAN:                                                        ICD-10-CM    1. Irregular bleeding  N92.6          Discussed with patient this can be normal on progesterone only birth control.  Patient chooses to monitor bleeding for now.will schedule colposcopy    PHYLLIS Simons CNP  [facility] [place]  "

## 2021-06-17 ENCOUNTER — OFFICE VISIT (OUTPATIENT)
Dept: OBGYN | Facility: CLINIC | Age: 26
End: 2021-06-17
Payer: COMMERCIAL

## 2021-06-17 VITALS
DIASTOLIC BLOOD PRESSURE: 56 MMHG | SYSTOLIC BLOOD PRESSURE: 92 MMHG | HEIGHT: 64 IN | WEIGHT: 167.4 LBS | BODY MASS INDEX: 28.58 KG/M2

## 2021-06-17 DIAGNOSIS — N92.6 IRREGULAR BLEEDING: Primary | ICD-10-CM

## 2021-06-17 PROCEDURE — 99212 OFFICE O/P EST SF 10 MIN: CPT | Performed by: NURSE PRACTITIONER

## 2021-06-17 ASSESSMENT — MIFFLIN-ST. JEOR: SCORE: 1475.35

## 2021-06-17 ASSESSMENT — PATIENT HEALTH QUESTIONNAIRE - PHQ9
SUM OF ALL RESPONSES TO PHQ QUESTIONS 1-9: 2
5. POOR APPETITE OR OVEREATING: NOT AT ALL

## 2021-06-17 ASSESSMENT — ANXIETY QUESTIONNAIRES
2. NOT BEING ABLE TO STOP OR CONTROL WORRYING: NOT AT ALL
5. BEING SO RESTLESS THAT IT IS HARD TO SIT STILL: NOT AT ALL
7. FEELING AFRAID AS IF SOMETHING AWFUL MIGHT HAPPEN: NOT AT ALL
3. WORRYING TOO MUCH ABOUT DIFFERENT THINGS: NOT AT ALL
GAD7 TOTAL SCORE: 0
IF YOU CHECKED OFF ANY PROBLEMS ON THIS QUESTIONNAIRE, HOW DIFFICULT HAVE THESE PROBLEMS MADE IT FOR YOU TO DO YOUR WORK, TAKE CARE OF THINGS AT HOME, OR GET ALONG WITH OTHER PEOPLE: NOT DIFFICULT AT ALL
1. FEELING NERVOUS, ANXIOUS, OR ON EDGE: NOT AT ALL
6. BECOMING EASILY ANNOYED OR IRRITABLE: NOT AT ALL

## 2021-06-18 ASSESSMENT — ANXIETY QUESTIONNAIRES: GAD7 TOTAL SCORE: 0

## 2021-07-05 NOTE — PROGRESS NOTES
INDICATIONS:                                                    Is a pregnancy test required: No  Was a consent obtained?  Yes    Today's PHQ-2 Score: No flowsheet data found.  Today's PHQ-9 Score:    PHQ-9 SCORE 6/17/2021   PHQ-9 Total Score 2       Hansa Ortiz, is a 25 year old female, who had a recent NIL pap.  HPV Other positive Yes prior history of abnormal pap. Here today for colposcopy. Discussed indication, risks of infection and bleeding.  8/27/14 ASCUS  9/7/16 ASCUS  10/5/17 ASCUS  11/8/18 HSIL  11/27/18 Colpo ECC OSEI II, bx cervicitis  1/3/19 LEEP OSEI II-III completely excised, ECC neg  1/14/20 NIL pap, neg HPV  5/25/21 NIL pap, + HR HPV (not 16/18)  Her last pap was   Lab Results   Component Value Date    PAP NIL, HPV other + 05/25/2021    .    PROCEDURE:                                                      Cervix is stained with acetic acid and viewed colposcopically. Squamocolumnar junction is visualized in it's entirety. no visible lesions . Biopsy done No. Endocervical curretage Done         POST PROCEDURE:                                                      IMPRESSION: Patient tolerated procedure well, colposcopy adequate and HPV    PLAN : Await the results of the biopsies.  She tolerated the procedure well. There were no complications. Patient was discharged in stable condition.    Patient advised to call the clinic if excessive bleeding, pelvic pain, or fever.     Follow-up based on pathology results.  Rayna Noel MD

## 2021-07-06 ENCOUNTER — OFFICE VISIT (OUTPATIENT)
Dept: OBGYN | Facility: CLINIC | Age: 26
End: 2021-07-06
Payer: COMMERCIAL

## 2021-07-06 VITALS
HEIGHT: 64 IN | BODY MASS INDEX: 28.68 KG/M2 | WEIGHT: 168 LBS | SYSTOLIC BLOOD PRESSURE: 112 MMHG | DIASTOLIC BLOOD PRESSURE: 64 MMHG

## 2021-07-06 DIAGNOSIS — Z01.812 PRE-PROCEDURE LAB EXAM: ICD-10-CM

## 2021-07-06 DIAGNOSIS — R87.810 CERVICAL HIGH RISK HPV (HUMAN PAPILLOMAVIRUS) TEST POSITIVE: Primary | ICD-10-CM

## 2021-07-06 DIAGNOSIS — Z98.890 S/P LEEP: ICD-10-CM

## 2021-07-06 PROCEDURE — 57456 ENDOCERV CURETTAGE W/SCOPE: CPT | Performed by: OBSTETRICS & GYNECOLOGY

## 2021-07-06 PROCEDURE — 88305 TISSUE EXAM BY PATHOLOGIST: CPT | Performed by: PATHOLOGY

## 2021-07-06 ASSESSMENT — MIFFLIN-ST. JEOR: SCORE: 1478.07

## 2021-07-09 LAB — COPATH REPORT: NORMAL

## 2021-07-13 ENCOUNTER — PATIENT OUTREACH (OUTPATIENT)
Dept: OBGYN | Facility: CLINIC | Age: 26
End: 2021-07-13

## 2021-08-13 ENCOUNTER — HOSPITAL ENCOUNTER (EMERGENCY)
Facility: CLINIC | Age: 26
Discharge: LEFT BEFORE DISCHARGE | End: 2021-08-13
Attending: PHYSICIAN ASSISTANT

## 2021-08-13 VITALS
WEIGHT: 163 LBS | TEMPERATURE: 97.3 F | SYSTOLIC BLOOD PRESSURE: 139 MMHG | HEART RATE: 71 BPM | RESPIRATION RATE: 20 BRPM | OXYGEN SATURATION: 99 % | BODY MASS INDEX: 28.2 KG/M2 | DIASTOLIC BLOOD PRESSURE: 94 MMHG
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
## 2021-08-15 ENCOUNTER — HOSPITAL ENCOUNTER (EMERGENCY)
Facility: CLINIC | Age: 26
Discharge: LEFT BEFORE DISCHARGE | End: 2021-08-15
Payer: COMMERCIAL

## 2021-08-15 VITALS
WEIGHT: 163 LBS | BODY MASS INDEX: 28.2 KG/M2 | RESPIRATION RATE: 16 BRPM | DIASTOLIC BLOOD PRESSURE: 95 MMHG | HEART RATE: 66 BPM | SYSTOLIC BLOOD PRESSURE: 137 MMHG | TEMPERATURE: 99 F | OXYGEN SATURATION: 100 %
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
## 2021-08-20 ENCOUNTER — TELEPHONE (OUTPATIENT)
Dept: OBGYN | Facility: CLINIC | Age: 26
End: 2021-08-20

## 2021-08-20 ENCOUNTER — ALLIED HEALTH/NURSE VISIT (OUTPATIENT)
Dept: NURSING | Facility: CLINIC | Age: 26
End: 2021-08-20
Payer: COMMERCIAL

## 2021-08-20 DIAGNOSIS — Z30.42 ENCOUNTER FOR DEPO-PROVERA CONTRACEPTION: Primary | ICD-10-CM

## 2021-08-20 PROCEDURE — 96372 THER/PROPH/DIAG INJ SC/IM: CPT | Performed by: OBSTETRICS & GYNECOLOGY

## 2021-08-20 PROCEDURE — 99207 PR NO CHARGE NURSE ONLY: CPT

## 2021-08-20 RX ADMIN — MEDROXYPROGESTERONE ACETATE 150 MG: 150 INJECTION, SUSPENSION INTRAMUSCULAR at 09:18

## 2021-08-20 NOTE — PROGRESS NOTES
Clinic Administered Medication Documentation          Depo Provera Documentation    URINE HCG: not indicated    Depo-Provera Standing Order inclusion/exclusion criteria reviewed.   Patient meets: inclusion criteria     BP: Data Unavailable  LAST PAP/EXAM:   Lab Results   Component Value Date    PAP NIL 05/25/2021       Prior to injection, verified patient identity using patient's name and date of birth. Medication was administered. Please see MAR and medication order for additional information.     Was entire vial of medication used? Yes  Vial/Syringe: Single dose vial  Expiration Date:  12/31/2022  NDC: 62981-700-80  LOT: [id number]    Patient instructed to report any adverse reaction to staff immediately .  NEXT INJECTION DUE: 11/5/21 - 11/19/21  SITE: BALAJI Miranda CMA on 8/20/2021 at 9:11 AM    Writer gave incorrect dates to patient for return date. Writer left message for patient with correct dates. Writer created phone encounter regarding this matter.

## 2021-08-20 NOTE — TELEPHONE ENCOUNTER
Consent to communicate PHI was given. Writer Left message stating her next depo shot date was supposed to be in December. Writer was incorrect. Writer stated her next depo shot should be between 11/5/2021 - 11/19/2021. If patient calls back please notify her of message above and schedule her next depo date correctly.

## 2021-11-10 ENCOUNTER — ALLIED HEALTH/NURSE VISIT (OUTPATIENT)
Dept: NURSING | Facility: CLINIC | Age: 26
End: 2021-11-10
Payer: COMMERCIAL

## 2021-11-10 DIAGNOSIS — Z30.42 ENCOUNTER FOR DEPO-PROVERA CONTRACEPTION: Primary | ICD-10-CM

## 2021-11-10 PROCEDURE — 96372 THER/PROPH/DIAG INJ SC/IM: CPT | Performed by: OBSTETRICS & GYNECOLOGY

## 2021-11-10 PROCEDURE — 99207 PR NO CHARGE NURSE ONLY: CPT

## 2021-11-10 RX ADMIN — MEDROXYPROGESTERONE ACETATE 150 MG: 150 INJECTION, SUSPENSION INTRAMUSCULAR at 11:03

## 2021-11-10 NOTE — PROGRESS NOTES
Clinic Administered Medication Documentation    Administrations This Visit     medroxyPROGESTERone (DEPO-PROVERA) injection 150 mg     Admin Date  11/10/2021 Action  Given Dose  150 mg Route  Intramuscular Site  Left Ventrogluteal Administered By  Audrey Dupree CMA    Ordering Provider: Rayna Noel MD    Patient Supplied?: No    Comments: due: 1/26-2/9/22                  Depo Provera Documentation    URINE HCG: not indicated    Depo-Provera Standing Order inclusion/exclusion criteria reviewed.   Patient meets: inclusion criteria     BP: Data Unavailable  LAST PAP/EXAM:   Lab Results   Component Value Date    PAP NIL 05/25/2021       Prior to injection, verified patient identity using patient's name and date of birth. Medication was administered. Please see MAR and medication order for additional information.     Was entire vial of medication used? Yes  Vial/Syringe: Single dose vial  Expiration Date:  04/2023  Site: Left Ventrogluteal  Patient instructed to report any adverse reaction to staff immediately .  NEXT INJECTION DUE: 1/26/22 - 2/9/22      Audrey Dupree CMA on 11/10/2021 at 10:56 AM

## 2022-01-27 ENCOUNTER — ALLIED HEALTH/NURSE VISIT (OUTPATIENT)
Dept: NURSING | Facility: CLINIC | Age: 27
End: 2022-01-27
Payer: COMMERCIAL

## 2022-01-27 DIAGNOSIS — Z30.42 ENCOUNTER FOR DEPO-PROVERA CONTRACEPTION: Primary | ICD-10-CM

## 2022-01-27 PROCEDURE — 99207 PR NO CHARGE NURSE ONLY: CPT

## 2022-01-27 PROCEDURE — 96372 THER/PROPH/DIAG INJ SC/IM: CPT | Performed by: OBSTETRICS & GYNECOLOGY

## 2022-01-27 RX ADMIN — MEDROXYPROGESTERONE ACETATE 150 MG: 150 INJECTION, SUSPENSION INTRAMUSCULAR at 12:23

## 2022-01-27 NOTE — PROGRESS NOTES
Clinic Administered Medication Documentation          Depo Provera Documentation    URINE HCG: not indicated    Depo- Provera Standing Order inclusion/exclusion criteria reviewed.  Patient meets: inclusion criteria    BP: Data Unavailable  LAST PAP/EXAM:   Lab Results   Component Value Date    PAP NIL 05/25/2021       Prior to injection, verified patient identity using patient's name and date of birth. Medication was administered. Please see MAR and medication order for additional information.     Was entire vial of medication used? Yes  Vial/Syringe: Single dose vial  Expiration Date:  08/2023  Site: Left Ventrogluteal per pt   Patient instructed to report any adverse reaction to staff immediately .  NEXT INJECTION DUE: 4/14/22 - 4/28/22    Carol Siu CMA on 1/27/22 at 12:23 PM

## 2022-02-02 ENCOUNTER — LAB REQUISITION (OUTPATIENT)
Dept: LAB | Facility: CLINIC | Age: 27
End: 2022-02-02

## 2022-02-02 PROCEDURE — 86706 HEP B SURFACE ANTIBODY: CPT | Performed by: INTERNAL MEDICINE

## 2022-02-02 PROCEDURE — 86481 TB AG RESPONSE T-CELL SUSP: CPT | Performed by: INTERNAL MEDICINE

## 2022-02-02 PROCEDURE — 86787 VARICELLA-ZOSTER ANTIBODY: CPT | Performed by: INTERNAL MEDICINE

## 2022-02-03 LAB
HBV SURFACE AB SERPL IA-ACNC: 906.32 M[IU]/ML
VZV IGG SER QL IA: 191 INDEX
VZV IGG SER QL IA: POSITIVE

## 2022-02-04 LAB
GAMMA INTERFERON BACKGROUND BLD IA-ACNC: 0.43 IU/ML
M TB IFN-G BLD-IMP: NEGATIVE
M TB IFN-G CD4+ BCKGRND COR BLD-ACNC: 9.57 IU/ML
MITOGEN IGNF BCKGRD COR BLD-ACNC: -0.07 IU/ML
MITOGEN IGNF BCKGRD COR BLD-ACNC: -0.11 IU/ML
QUANTIFERON MITOGEN: 10 IU/ML
QUANTIFERON NIL TUBE: 0.43 IU/ML
QUANTIFERON TB1 TUBE: 0.36 IU/ML
QUANTIFERON TB2 TUBE: 0.32

## 2022-04-26 ENCOUNTER — ALLIED HEALTH/NURSE VISIT (OUTPATIENT)
Dept: NURSING | Facility: CLINIC | Age: 27
End: 2022-04-26

## 2022-04-26 DIAGNOSIS — Z30.42 ENCOUNTER FOR DEPO-PROVERA CONTRACEPTION: Primary | ICD-10-CM

## 2022-04-26 PROCEDURE — 99207 PR NO CHARGE NURSE ONLY: CPT

## 2022-04-26 PROCEDURE — 96372 THER/PROPH/DIAG INJ SC/IM: CPT | Performed by: OBSTETRICS & GYNECOLOGY

## 2022-04-26 RX ORDER — MEDROXYPROGESTERONE ACETATE 150 MG/ML
150 INJECTION, SUSPENSION INTRAMUSCULAR ONCE
Status: COMPLETED | OUTPATIENT
Start: 2022-04-26 | End: 2022-04-26

## 2022-04-26 RX ADMIN — MEDROXYPROGESTERONE ACETATE 150 MG: 150 INJECTION, SUSPENSION INTRAMUSCULAR at 12:00

## 2022-04-26 NOTE — PROGRESS NOTES
Clinic Administered Medication Documentation    Administrations This Visit     medroxyPROGESTERone (DEPO-PROVERA) injection 150 mg     Admin Date  04/26/2022 Action  Given Dose  150 mg Route  Intramuscular Site  Right Ventrogluteal Administered By  Audrey Dupree CMA    Ordering Provider: Rayna Noel MD    Patient Supplied?: No    Comments: Due w/ annual: 7/12-7/26                  Depo Provera Documentation    URINE HCG: not indicated    Depo-Provera Standing Order inclusion/exclusion criteria reviewed.   Patient meets: inclusion criteria     BP: Data Unavailable  LAST PAP/EXAM:   Lab Results   Component Value Date    PAP NIL 05/25/2021       Prior to injection, verified patient identity using patient's name and date of birth. Medication was administered. Please see MAR and medication order for additional information.     Was entire vial of medication used? Yes  Vial/Syringe: Single dose vial  Expiration Date:  09/30/2023  Site: Right Ventrogluteal  Patient instructed to report any adverse reaction to staff immediately .  NEXT INJECTION DUE: 7/12/22 - 7/26/22- needs annual before next Depo dose  Audrey Dupree CMA on 4/26/2022 at 11:52 AM

## 2022-05-19 ENCOUNTER — OFFICE VISIT (OUTPATIENT)
Dept: OBGYN | Facility: CLINIC | Age: 27
End: 2022-05-19

## 2022-05-19 VITALS
WEIGHT: 158.6 LBS | DIASTOLIC BLOOD PRESSURE: 54 MMHG | SYSTOLIC BLOOD PRESSURE: 92 MMHG | BODY MASS INDEX: 27.08 KG/M2 | HEIGHT: 64 IN

## 2022-05-19 DIAGNOSIS — N93.8 DUB (DYSFUNCTIONAL UTERINE BLEEDING): ICD-10-CM

## 2022-05-19 DIAGNOSIS — B96.89 BACTERIAL VAGINOSIS: ICD-10-CM

## 2022-05-19 DIAGNOSIS — N89.8 VAGINAL ODOR: Primary | ICD-10-CM

## 2022-05-19 DIAGNOSIS — N76.0 BACTERIAL VAGINOSIS: ICD-10-CM

## 2022-05-19 LAB
CLUE CELLS: PRESENT
TRICHOMONAS, WET PREP: ABNORMAL
WBC'S/HIGH POWER FIELD, WET PREP: ABNORMAL
YEAST, WET PREP: ABNORMAL

## 2022-05-19 PROCEDURE — 87591 N.GONORRHOEAE DNA AMP PROB: CPT | Performed by: NURSE PRACTITIONER

## 2022-05-19 PROCEDURE — 87210 SMEAR WET MOUNT SALINE/INK: CPT | Performed by: NURSE PRACTITIONER

## 2022-05-19 PROCEDURE — 99214 OFFICE O/P EST MOD 30 MIN: CPT | Performed by: NURSE PRACTITIONER

## 2022-05-19 PROCEDURE — 87491 CHLMYD TRACH DNA AMP PROBE: CPT | Performed by: NURSE PRACTITIONER

## 2022-05-19 RX ORDER — METRONIDAZOLE 7.5 MG/G
1 GEL VAGINAL AT BEDTIME
Qty: 70 G | Refills: 0 | Status: SHIPPED | OUTPATIENT
Start: 2022-05-19 | End: 2022-05-24

## 2022-05-19 NOTE — PROGRESS NOTES
SUBJECTIVE:                                                   Hansa Ortiz is a 26 year old female who presents to clinic today for the following health issue(s):  Patient presents with:  Abnormal Uterine Bleeding: Irregular bleeding - spotting/  pink/red in color-  has been happening for the past year, been on Depo for past year - prior Depo use was 5-6 years had no cycles during this time          HPI:Patient had a baby a year ago.  Went back on depoprovera and has spotting pretty much off and on for the last year.  She did not have that at all when on it previously.  She also states has a vagina odor.  Denies any pelvic pain.  Does want STD testing done.      No LMP recorded..     Patient is sexually active, .  Using depo or other injectable for contraception. 20   reports that she has never smoked. She has never used smokeless tobacco.  STD testing offered?  Declined    Health maintenance updated:  yes    Today's PHQ-2 Score: No flowsheet data found.  Today's PHQ-9 Score:   PHQ-9 SCORE 2021   PHQ-9 Total Score 2     Today's KIRTI-7 Score:   KIRTI-7 SCORE 2021   Total Score 0       Problem list and histories reviewed & adjusted, as indicated.  Additional history: as documented.    Patient Active Problem List   Diagnosis     Encounter for triage in pregnant patient     Indication for care in labor or delivery     Supervision of high risk pregnancy in third trimester     OSEI III (cervical intraepithelial neoplasia III)     Past Surgical History:   Procedure Laterality Date     LEEP TX, CERVICAL  2019      Social History     Tobacco Use     Smoking status: Never Smoker     Smokeless tobacco: Never Used   Substance Use Topics     Alcohol use: Yes     Comment: wine      Problem (# of Occurrences) Relation (Name,Age of Onset)    Clotting Disorder (1) Mother            Current Outpatient Medications   Medication Sig     acetaminophen (TYLENOL) 325 MG tablet Take 2 tablets (650 mg) by mouth  "every 4 hours as needed for mild pain (Patient not taking: No sig reported)     ferrous sulfate (FEROSUL) 325 (65 Fe) MG tablet Take 1 tablet (325 mg) by mouth daily (with breakfast) (Patient not taking: No sig reported)     ibuprofen (ADVIL/MOTRIN) 800 MG tablet Take 1 tablet (800 mg) by mouth every 6 hours as needed for other (cramping) (Patient not taking: No sig reported)     metroNIDAZOLE (METROGEL) 0.75 % vaginal gel Place 1 applicator (5 g) vaginally At Bedtime for 5 days     vitamin C (ASCORBIC ACID) 500 MG tablet Take 1 tablet (500 mg) by mouth daily (Patient not taking: No sig reported)     No current facility-administered medications for this visit.     No Known Allergies    ROS:  12 point review of systems negative other than symptoms noted below or in the HPI.  Genitourinary: Irregular Menses  No urinary frequency or dysuria, bladder or kidney problems, vaginal odor      OBJECTIVE:     BP 92/54   Ht 1.619 m (5' 3.75\")   Wt 71.9 kg (158 lb 9.6 oz)   Breastfeeding No   BMI 27.44 kg/m    Body mass index is 27.44 kg/m .    Exam:  Constitutional:  Appearance: Well nourished, well developed alert, in no acute distress  Neurologic:  Mental Status:  Oriented X3.  Normal strength and tone, sensory exam grossly normal, mentation intact and speech normal.    Psychiatric:  Mentation appears normal and affect normal/bright.  Pelvic Exam:  External Genitalia:     Normal appearance for age, no discharge present, no tenderness present, no inflammatory lesions present, color normal  Vagina:     Normal vaginal vault without central or paravaginal defects, pinkish brown discharge present, no inflammatory lesions present, no masses present Wet mount was done.  Chlamydia/GC done  Bladder:     Nontender to palpation  Urethra:   Urethral Body:  Urethra palpation normal, urethra structural support normal   Urethral Meatus:  No erythema or lesions present  Cervix:     Appearance healthy, no lesions present, nontender to " palpation, no bleeding present  Perineum:     Perineum within normal limits, no evidence of trauma, no rashes or skin lesions present  Anus:     Anus within normal limits, no hemorrhoids present  Inguinal Lymph Nodes:     No lymphadenopathy present  Pubic Hair:     Normal pubic hair distribution for age  Genitalia and Groin:     No rashes present, no lesions present, no areas of discoloration, no masses present       In-Clinic Test Results:  Results for orders placed or performed in visit on 05/19/22 (from the past 24 hour(s))   Wet preparation    Specimen: Vagina; Swab   Result Value Ref Range    Trichomonas Absent Absent    Yeast Absent Absent    Clue Cells Present (A) Absent    WBCs/high power field 2+ (A) None       ASSESSMENT/PLAN:                                                        ICD-10-CM    1. Vaginal odor  N89.8 Wet preparation   2. DUB (dysfunctional uterine bleeding)  N93.8 US Transvaginal Pelvic Non-OB     NEISSERIA GONORRHOEA PCR     CHLAMYDIA TRACHOMATIS PCR   3. Bacterial vaginosis  N76.0 metroNIDAZOLE (METROGEL) 0.75 % vaginal gel    B96.89          Patient to start on metrogel.  Will notify patient with lab results when available.  If spotting persists after treatment of metrogel, to return for pelvic US and to see me after.    PHYLLIS Simons CNP  [facility] [place]

## 2022-05-21 LAB
C TRACH DNA SPEC QL NAA+PROBE: NEGATIVE
N GONORRHOEA DNA SPEC QL NAA+PROBE: NEGATIVE

## 2022-06-07 ENCOUNTER — HOSPITAL ENCOUNTER (EMERGENCY)
Facility: CLINIC | Age: 27
Discharge: HOME | End: 2022-06-07
Attending: PHYSICIAN ASSISTANT | Admitting: PHYSICIAN ASSISTANT
Payer: COMMERCIAL

## 2022-06-07 ENCOUNTER — APPOINTMENT (OUTPATIENT)
Dept: GENERAL RADIOLOGY | Facility: CLINIC | Age: 27
End: 2022-06-07
Attending: PHYSICIAN ASSISTANT
Payer: COMMERCIAL

## 2022-06-07 VITALS
BODY MASS INDEX: 26.33 KG/M2 | HEIGHT: 65 IN | OXYGEN SATURATION: 97 % | RESPIRATION RATE: 16 BRPM | DIASTOLIC BLOOD PRESSURE: 78 MMHG | SYSTOLIC BLOOD PRESSURE: 109 MMHG | WEIGHT: 158 LBS | HEART RATE: 79 BPM | TEMPERATURE: 97.5 F

## 2022-06-07 DIAGNOSIS — S61.213A LACERATION OF LEFT MIDDLE FINGER WITHOUT FOREIGN BODY WITHOUT DAMAGE TO NAIL, INITIAL ENCOUNTER: ICD-10-CM

## 2022-06-07 DIAGNOSIS — S63.613A SPRAIN OF LEFT MIDDLE FINGER, UNSPECIFIED SITE OF DIGIT, INITIAL ENCOUNTER: ICD-10-CM

## 2022-06-07 PROCEDURE — 10060 I&D ABSCESS SIMPLE/SINGLE: CPT

## 2022-06-07 PROCEDURE — 73140 X-RAY EXAM OF FINGER(S): CPT | Mod: LT

## 2022-06-07 PROCEDURE — 99284 EMERGENCY DEPT VISIT MOD MDM: CPT | Mod: 25

## 2022-06-07 PROCEDURE — 29130 APPL FINGER SPLINT STATIC: CPT | Mod: F2

## 2022-06-07 PROCEDURE — 250N000013 HC RX MED GY IP 250 OP 250 PS 637: Performed by: EMERGENCY MEDICINE

## 2022-06-07 RX ORDER — IBUPROFEN 600 MG/1
600 TABLET, FILM COATED ORAL ONCE
Status: COMPLETED | OUTPATIENT
Start: 2022-06-07 | End: 2022-06-07

## 2022-06-07 RX ORDER — ACETAMINOPHEN 500 MG
1000 TABLET ORAL ONCE
Status: COMPLETED | OUTPATIENT
Start: 2022-06-07 | End: 2022-06-07

## 2022-06-07 RX ADMIN — IBUPROFEN 600 MG: 600 TABLET ORAL at 19:25

## 2022-06-07 RX ADMIN — ACETAMINOPHEN 1000 MG: 500 TABLET, FILM COATED ORAL at 19:25
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
## 2022-06-08 NOTE — ED PROVIDER NOTES
History     Chief Complaint:  Hand Injury      HPI   Hansa Ortiz is a 26 year old female who presents with injury to the left middle finger after slamming it in a car door just prior to arrival.  She denies numbness.  She feels like she cannot move the finger.  She localizes the pain to the middle and distal phalanxes.    Review of Systems   All other systems reviewed and are negative.    Allergies:  No Known Allergies      Medications:    acetaminophen (TYLENOL) 325 MG tablet  ferrous sulfate (FEROSUL) 325 (65 Fe) MG tablet  ibuprofen (ADVIL/MOTRIN) 800 MG tablet  vitamin C (ASCORBIC ACID) 500 MG tablet        Past Medical History:    Past Medical History:   Diagnosis Date     Back pain      OSEI III (cervical intraepithelial neoplasia III) 1/3/2019     Hx of abnormal cervical Pap smear      Trichomonas infection 01/2020     Patient Active Problem List    Diagnosis Date Noted     Indication for care in labor or delivery 04/09/2021     Priority: Medium     Supervision of high risk pregnancy in third trimester 04/09/2021     Priority: Medium     Encounter for triage in pregnant patient 02/15/2021     Priority: Medium     OSEI III (cervical intraepithelial neoplasia III) 01/03/2019     Priority: Medium     8/27/14 ASCUS  9/7/16 ASCUS  10/5/17 ASCUS  11/8/18 HSIL  11/27/18 Colpo ECC OSEI II, bx cervicitis  1/3/19 LEEP OSEI II-III completely excised, ECC neg  1/14/20 NIL pap, neg HPV  5/25/21 NIL pap, + HR HPV (not 16/18). Plan: colposcopy due before 8/25/21 6/1/21 Left message  06/02/21 Pt notified  7/6/21 [place]- ECC, negative. Plan 1 yr repeat pap. Pt notified by phone           Past Surgical History:    Past Surgical History:   Procedure Laterality Date     LEEP TX, CERVICAL  01/01/2019       Family History:    Family History   Problem Relation Age of Onset     Clotting Disorder Mother        Social History:  [employer], [place]  The patient presents to the ED with significant other    Physical Exam  "    Patient Vitals for the past 24 hrs:   BP Temp Temp src Pulse Resp SpO2 Height Weight   06/07/22 1918 109/78 97.5  F (36.4  C) Temporal 79 16 97 % 1.651 m (5' 5\") 71.7 kg (158 lb)       Physical Exam  General: Alert and oriented.    Head: Normocephalic.  External ears and nose normal.    Eyes: Pupils equal and round.  Normal tracking.    Pulmonary/Chest: Effort and rate normal    MSK: Normal ROM in MCP, PIP, DIP joints of fingers and hand.  No ligamentous laxity of joints.    SKIN: There is a 1 cm very shallow laceration over the flexor surface of the left third finger.  No bleeding vessels or tendon injury.  Warm and dry with strong radial pulse and normal capillary refill.    NEURO:  Normal strength of flexion and extension at MCP, PIP, and DIP joints.  Normal sensation to fine touch bilaterally in finger.    PSYCH:  Normal affect      Emergency Department Course   Imaging:  Fingers XR, 2-3 views, left   Final Result   IMPRESSION: No acute fracture is identified. Normal joint spaces and alignment.           Report per radiology    Emergency Department Course:  Reviewed:  I reviewed nursing notes, vitals, past medical history, Care Everywhere and [facility]    Assessments:  1930 I obtained history and examined the patient as noted above.    I rechecked the patient and explained findings.     Interventions:  Medications   acetaminophen (TYLENOL) tablet 1,000 mg (1,000 mg Oral Given 6/7/22 1925)   ibuprofen (ADVIL/MOTRIN) tablet 600 mg (600 mg Oral Given 6/7/22 1925)     Disposition:  The patient was discharged to home.     Impression & Plan    Medical Decision Making:  Hansa Ortiz is a 26 year old female who presents with laceration and pain in left middle finger after slamming it in a door..  Patient history and records reviewed.  On examination, the patient has a laceration, but is otherwise well appearing.  There is no evidence of neurovascular compromise, fracture, imbedded foreign body, or tendon injury.  " Digital block performed, wound irrigated explored and dressed no occasion for closure given small superficial nature.  Tetanus checked and UTD.  Discussed indications to return to ED if new or worsening symptoms, or signs of infection such as fever, swelling, spreading erythema, drainage, or increasing pain.  Splint given for comfort follow-up with PCP or Ortho if not improving as expected.    Diagnosis:    ICD-10-CM    1. Laceration of left middle finger without foreign body without damage to nail, initial encounter  S61.213A    2. Sprain of left middle finger, unspecified site of digit, initial encounter  S63.613A        Discharge Medications:  New Prescriptions    No medications on file         Scribe Disclosure:  Malachi DECKER PA-C, am serving as a scribe at 7:21 PM on 6/7/2022 to document services personally performed by Rolo based on my observations and the provider's statements to me.      Malachi Seth PA-C  06/07/22 2017

## 2022-06-15 ENCOUNTER — HOSPITAL ENCOUNTER (EMERGENCY)
Facility: CLINIC | Age: 27
Discharge: HOME | End: 2022-06-15
Attending: EMERGENCY MEDICINE | Admitting: EMERGENCY MEDICINE
Payer: COMMERCIAL

## 2022-06-15 ENCOUNTER — APPOINTMENT (OUTPATIENT)
Dept: CT IMAGING | Facility: CLINIC | Age: 27
End: 2022-06-15
Attending: EMERGENCY MEDICINE
Payer: COMMERCIAL

## 2022-06-15 VITALS
SYSTOLIC BLOOD PRESSURE: 111 MMHG | HEIGHT: 65 IN | OXYGEN SATURATION: 96 % | WEIGHT: 158 LBS | DIASTOLIC BLOOD PRESSURE: 86 MMHG | TEMPERATURE: 98.9 F | HEART RATE: 82 BPM | RESPIRATION RATE: 16 BRPM | BODY MASS INDEX: 26.33 KG/M2

## 2022-06-15 DIAGNOSIS — S16.1XXA CERVICAL STRAIN, INITIAL ENCOUNTER: ICD-10-CM

## 2022-06-15 PROCEDURE — 72125 CT NECK SPINE W/O DYE: CPT

## 2022-06-15 PROCEDURE — 99284 EMERGENCY DEPT VISIT MOD MDM: CPT | Mod: 25

## 2022-06-15 PROCEDURE — 250N000013 HC RX MED GY IP 250 OP 250 PS 637: Performed by: EMERGENCY MEDICINE

## 2022-06-15 RX ORDER — CYCLOBENZAPRINE HCL 10 MG
10 TABLET ORAL EVERY 8 HOURS PRN
Qty: 20 TABLET | Refills: 0 | Status: SHIPPED | OUTPATIENT
Start: 2022-06-15 | End: 2022-06-23

## 2022-06-15 RX ORDER — ACETAMINOPHEN 325 MG/1
650 TABLET ORAL ONCE
Status: COMPLETED | OUTPATIENT
Start: 2022-06-15 | End: 2022-06-15

## 2022-06-15 RX ORDER — IBUPROFEN 800 MG/1
800 TABLET, FILM COATED ORAL EVERY 8 HOURS PRN
Qty: 15 TABLET | Refills: 0 | Status: ON HOLD | OUTPATIENT
Start: 2022-06-15 | End: 2022-09-07

## 2022-06-15 RX ORDER — IBUPROFEN 600 MG/1
600 TABLET, FILM COATED ORAL ONCE
Status: COMPLETED | OUTPATIENT
Start: 2022-06-15 | End: 2022-06-15

## 2022-06-15 RX ADMIN — ACETAMINOPHEN 650 MG: 325 TABLET, FILM COATED ORAL at 09:54

## 2022-06-15 RX ADMIN — IBUPROFEN 600 MG: 600 TABLET, FILM COATED ORAL at 08:53

## 2022-06-15 ASSESSMENT — ENCOUNTER SYMPTOMS
ABDOMINAL PAIN: 0
NECK PAIN: 1
WEAKNESS: 0
NUMBNESS: 0
HEADACHES: 0
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
## 2022-06-21 ENCOUNTER — PATIENT OUTREACH (OUTPATIENT)
Dept: OBGYN | Facility: CLINIC | Age: 27
End: 2022-06-21
Payer: COMMERCIAL

## 2022-06-21 NOTE — LETTER
June 21, 2022      Hansa Ortiz  [address]  [place] [address]        Dear ,    This letter is to remind you that you are due for your follow-up Pap smear and Human Papillomavirus (HPV) test.    Please call [phone] to schedule your appointment at your earliest convenience.    If you have completed the appointment outside of the [facility] system, please have the records forwarded to our office. We will update your chart for your provider to review before your next annual wellness visit.     Thank you for choosing [facility]!      Sincerely,    Your [facility] Care Team

## 2022-07-21 ENCOUNTER — ALLIED HEALTH/NURSE VISIT (OUTPATIENT)
Dept: NURSING | Facility: CLINIC | Age: 27
End: 2022-07-21
Payer: COMMERCIAL

## 2022-07-21 DIAGNOSIS — Z30.42 ENCOUNTER FOR DEPO-PROVERA CONTRACEPTION: Primary | ICD-10-CM

## 2022-07-21 PROCEDURE — 96372 THER/PROPH/DIAG INJ SC/IM: CPT | Performed by: OBSTETRICS & GYNECOLOGY

## 2022-07-21 PROCEDURE — 99207 PR NO CHARGE NURSE ONLY: CPT

## 2022-07-21 RX ORDER — MEDROXYPROGESTERONE ACETATE 150 MG/ML
150 INJECTION, SUSPENSION INTRAMUSCULAR
Status: COMPLETED | OUTPATIENT
Start: 2022-07-21 | End: 2022-07-21

## 2022-07-21 RX ADMIN — MEDROXYPROGESTERONE ACETATE 150 MG: 150 INJECTION, SUSPENSION INTRAMUSCULAR at 15:30

## 2022-07-21 NOTE — PROGRESS NOTES
Clinic Administered Medication Documentation    Administrations This Visit     medroxyPROGESTERone (DEPO-PROVERA) injection 150 mg     Admin Date  07/21/2022 Action  Given Dose  150 mg Route  Intramuscular Site  Left Ventrogluteal Administered By  Audrey Dupree CMA    Ordering Provider: Rayna Noel MD    Patient Supplied?: No    Comments: Due: 10/6-10/20  Annual [facility] 9/26                  Depo Provera Documentation    URINE HCG: not indicated    Depo-Provera Standing Order inclusion/exclusion criteria reviewed.   Patient meets: inclusion criteria     BP: Data Unavailable  LAST PAP/EXAM:   Lab Results   Component Value Date    PAP NIL 05/25/2021       Prior to injection, verified patient identity using patient's name and date of birth. Medication was administered. Please see MAR and medication order for additional information.     Was entire vial of medication used? Yes  Vial/Syringe: Single dose vial  Expiration Date:  12/2023  Site: Left Ventrogluteal  Patient instructed to report any adverse reaction to staff immediately .  NEXT INJECTION DUE: 10/6/22 - 10/20/22  Audrey Dupree CMA on 7/21/2022 at 3:14 PM

## 2022-07-27 ENCOUNTER — OFFICE VISIT (OUTPATIENT)
Dept: OBGYN | Facility: CLINIC | Age: 27
End: 2022-07-27
Payer: COMMERCIAL

## 2022-07-27 VITALS — SYSTOLIC BLOOD PRESSURE: 106 MMHG | WEIGHT: 184 LBS | BODY MASS INDEX: 30.62 KG/M2 | DIASTOLIC BLOOD PRESSURE: 60 MMHG

## 2022-07-27 DIAGNOSIS — B97.7 HPV IN FEMALE: ICD-10-CM

## 2022-07-27 DIAGNOSIS — N92.6 IRREGULAR BLEEDING: Primary | ICD-10-CM

## 2022-07-27 PROCEDURE — 99213 OFFICE O/P EST LOW 20 MIN: CPT | Performed by: NURSE PRACTITIONER

## 2022-07-27 PROCEDURE — 87624 HPV HI-RISK TYP POOLED RSLT: CPT | Performed by: NURSE PRACTITIONER

## 2022-07-27 PROCEDURE — 88175 CYTOPATH C/V AUTO FLUID REDO: CPT | Performed by: NURSE PRACTITIONER

## 2022-07-27 RX ORDER — CYCLOBENZAPRINE HCL 10 MG
10 TABLET ORAL
COMMUNITY
Start: 2021-09-13

## 2022-07-27 RX ORDER — IBUPROFEN 600 MG/1
TABLET, FILM COATED ORAL
Status: ON HOLD | COMMUNITY
Start: 2021-09-13 | End: 2022-09-07

## 2022-07-27 RX ORDER — IBUPROFEN 600 MG/1
600 TABLET, FILM COATED ORAL
Status: ON HOLD | COMMUNITY
Start: 2021-09-21 | End: 2022-09-07

## 2022-07-27 NOTE — PROGRESS NOTES
SUBJECTIVE:                                                   Hansa Ortiz is a 27 year old female who presents to clinic today for the following health issue(s):  Patient presents with:  Vaginal Problem: Long periods, other complication associated to depo, heavy bleeding, denies any other pains          HPI:*-Patient has been on depoprovera since baby a year ago.  She states she has been bleeding off and on since starting.  At times can be heavy and other times just spotting.  She denies any pelvic pain.  She is due for a pap this month Had a colpo in  with Dr. Noel for NIL pap positive for other HPV.      No LMP recorded (lmp unknown)..     Patient is sexually active, .  Using depo or other injectable for contraception.    reports that she has never smoked. She has never used smokeless tobacco.    STD testing offered?  Declined    Health maintenance updated:  yes    Today's PHQ-2 Score: No flowsheet data found.  Today's PHQ-9 Score:   PHQ-9 SCORE 2021   PHQ-9 Total Score 2     Today's KIRTI-7 Score:   KIRTI-7 SCORE 2021   Total Score 0       Problem list and histories reviewed & adjusted, as indicated.  Additional history: as documented.    Patient Active Problem List   Diagnosis     Encounter for triage in pregnant patient     Indication for care in labor or delivery     Supervision of high risk pregnancy in third trimester     OSEI III (cervical intraepithelial neoplasia III)     Past Surgical History:   Procedure Laterality Date     LEEP TX, CERVICAL  2019      Social History     Tobacco Use     Smoking status: Never Smoker     Smokeless tobacco: Never Used   Substance Use Topics     Alcohol use: Yes     Comment: wine      Problem (# of Occurrences) Relation (Name,Age of Onset)    Clotting Disorder (1) Mother            Current Outpatient Medications   Medication Sig     cyclobenzaprine (FLEXERIL) 10 MG tablet Take 10 mg by mouth     ferrous sulfate (FEROSUL) 325 (65 Fe) MG tablet  Take 1 tablet (325 mg) by mouth daily (with breakfast)     ibuprofen (ADVIL/MOTRIN) 600 MG tablet Take 600 mg by mouth     ibuprofen (ADVIL/MOTRIN) 600 MG tablet TAKE 1 TABLET BY MOUTH EVERY 8 HOURS AS NEEDED     ibuprofen (ADVIL/MOTRIN) 800 MG tablet Take 1 tablet (800 mg) by mouth every 8 hours as needed for moderate pain (take with food)     vitamin C (ASCORBIC ACID) 500 MG tablet Take 1 tablet (500 mg) by mouth daily     acetaminophen (TYLENOL) 325 MG tablet Take 2 tablets (650 mg) by mouth every 4 hours as needed for mild pain (Patient not taking: No sig reported)     No current facility-administered medications for this visit.     No Known Allergies    ROS:  12 point review of systems negative other than symptoms noted below or in the HPI.  Genitourinary: Irregular Menses        OBJECTIVE:     /60   Wt 83.5 kg (184 lb)   LMP  (LMP Unknown)   Breastfeeding No   BMI 30.62 kg/m    Body mass index is 30.62 kg/m .    Exam:  Constitutional:  Appearance: Well nourished, well developed alert, in no acute distress  Neurologic:  Mental Status:  Oriented X3.  Normal strength and tone, sensory exam grossly normal, mentation intact and speech normal.    Psychiatric:  Mentation appears normal and affect normal/bright.  Pelvic Exam:  External Genitalia:     Normal appearance for age, no discharge present, no tenderness present, no inflammatory lesions present, color normal  Vagina:     Normal vaginal vault without central or paravaginal defects, no discharge present, no inflammatory lesions present, no masses present  Bladder:     Nontender to palpation  Urethra:   Urethral Body:  Urethra palpation normal, urethra structural support normal   Urethral Meatus:  No erythema or lesions present  Cervix:     Appearance healthy, no lesions present, nontender to palpation, spotting  present  Uterus:     Uterus: firm, normal sized and nontender, anteverted in position.   Adnexa:     No adnexal tenderness present, no adnexal  masses present  Perineum:     Perineum within normal limits, no evidence of trauma, no rashes or skin lesions present  Anus:     Anus within normal limits, no hemorrhoids present  Inguinal Lymph Nodes:     No lymphadenopathy present  Pubic Hair:     Normal pubic hair distribution for age  Genitalia and Groin:     No rashes present, no lesions present, no areas of discoloration, no masses present       In-Clinic Test Results:  No results found for this or any previous visit (from the past 24 hour(s)).    ASSESSMENT/PLAN:                                                        ICD-10-CM    1. Irregular bleeding  N92.6 US Transvaginal Pelvic Non-OB   2. HPV in female  B97.7 Pap screen reflex to HPV if ASCUS - recommend age 25 - 29         Patient to return for pelvic US if all normal discussed adding estrogen for a month to see if helps bleeding stop.    PHYLLIS Simons CNP  [facility] [place]

## 2022-07-29 LAB
BKR LAB AP GYN ADEQUACY: NORMAL
BKR LAB AP GYN INTERPRETATION: NORMAL
BKR LAB AP HPV REFLEX: NORMAL
BKR LAB AP PREVIOUS ABNL DX: NORMAL
BKR LAB AP PREVIOUS ABNORMAL: NORMAL
PATH REPORT.COMMENTS IMP SPEC: NORMAL
PATH REPORT.COMMENTS IMP SPEC: NORMAL
PATH REPORT.RELEVANT HX SPEC: NORMAL

## 2022-08-01 ENCOUNTER — ANCILLARY PROCEDURE (OUTPATIENT)
Dept: ULTRASOUND IMAGING | Facility: CLINIC | Age: 27
End: 2022-08-01
Payer: COMMERCIAL

## 2022-08-01 ENCOUNTER — OFFICE VISIT (OUTPATIENT)
Dept: OBGYN | Facility: CLINIC | Age: 27
End: 2022-08-01
Payer: COMMERCIAL

## 2022-08-01 VITALS
SYSTOLIC BLOOD PRESSURE: 108 MMHG | BODY MASS INDEX: 30.66 KG/M2 | WEIGHT: 184 LBS | HEIGHT: 65 IN | DIASTOLIC BLOOD PRESSURE: 68 MMHG

## 2022-08-01 DIAGNOSIS — N92.6 IRREGULAR BLEEDING: ICD-10-CM

## 2022-08-01 DIAGNOSIS — N92.6 IRREGULAR BLEEDING: Primary | ICD-10-CM

## 2022-08-01 PROCEDURE — 99213 OFFICE O/P EST LOW 20 MIN: CPT | Performed by: NURSE PRACTITIONER

## 2022-08-01 PROCEDURE — 76830 TRANSVAGINAL US NON-OB: CPT

## 2022-08-01 NOTE — PROGRESS NOTES
SUBJECTIVE:                                                   Hansa Ortiz is a 27 year old female who presents to clinic today for the following health issue(s):  Patient presents with:  Follow Up: US follow up/ irregular bleeding           HPI:  Patient here for follow up from pelvic US and irregular bleeding/spotting on depoprovera since baby a year ago.  She states bleeding stopped as of yesterday .      No LMP recorded (lmp unknown)..     Patient is sexually active, .  Using depo or other injectable for contraception.    reports that she has never smoked. She has never used smokeless tobacco.  STD testing offered?  Declined    Health maintenance updated:  yes    Today's PHQ-2 Score: No flowsheet data found.  Today's PHQ-9 Score:   PHQ-9 SCORE 2021   PHQ-9 Total Score 2     Today's KIRTI-7 Score:   KIRTI-7 SCORE 2021   Total Score 0       Problem list and histories reviewed & adjusted, as indicated.  Additional history: as documented.    Patient Active Problem List   Diagnosis     Encounter for triage in pregnant patient     Indication for care in labor or delivery     Supervision of high risk pregnancy in third trimester     OSEI III (cervical intraepithelial neoplasia III)     Past Surgical History:   Procedure Laterality Date     LEEP TX, CERVICAL  2019      Social History     Tobacco Use     Smoking status: Never Smoker     Smokeless tobacco: Never Used   Substance Use Topics     Alcohol use: Yes     Comment: wine      Problem (# of Occurrences) Relation (Name,Age of Onset)    Clotting Disorder (1) Mother            Current Outpatient Medications   Medication Sig     acetaminophen (TYLENOL) 325 MG tablet Take 2 tablets (650 mg) by mouth every 4 hours as needed for mild pain     cyclobenzaprine (FLEXERIL) 10 MG tablet Take 10 mg by mouth     ibuprofen (ADVIL/MOTRIN) 600 MG tablet TAKE 1 TABLET BY MOUTH EVERY 8 HOURS AS NEEDED     ferrous sulfate (FEROSUL) 325 (65 Fe) MG tablet Take 1  "tablet (325 mg) by mouth daily (with breakfast) (Patient not taking: Reported on 8/1/2022)     ibuprofen (ADVIL/MOTRIN) 600 MG tablet Take 600 mg by mouth (Patient not taking: Reported on 8/1/2022)     ibuprofen (ADVIL/MOTRIN) 800 MG tablet Take 1 tablet (800 mg) by mouth every 8 hours as needed for moderate pain (take with food) (Patient not taking: Reported on 8/1/2022)     vitamin C (ASCORBIC ACID) 500 MG tablet Take 1 tablet (500 mg) by mouth daily (Patient not taking: Reported on 8/1/2022)     No current facility-administered medications for this visit.     No Known Allergies    ROS:  12 point review of systems negative other than symptoms noted below or in the HPI.        OBJECTIVE:     /68   Ht 1.651 m (5' 5\")   Wt 83.5 kg (184 lb)   LMP  (LMP Unknown)   Breastfeeding No   BMI 30.62 kg/m    Body mass index is 30.62 kg/m .    Exam:  Constitutional:  Appearance: Well nourished, well developed alert, in no acute distress  Neurologic:  Mental Status:  Oriented X3.  Normal strength and tone, sensory exam grossly normal, mentation intact and speech normal.    Psychiatric:  Mentation appears normal and affect normal/bright.     In-Clinic Test Results:  Results for orders placed or performed in visit on 08/01/22 (from the past 24 hour(s))   US Transvaginal Pelvic Non-OB    Narrative    EXAM: TRANSVAGINAL PELVIC ULTRASOUND  LOCATION: [facility]  DATE/TIME: 8/1/2022 2:48 PM    INDICATION:  Irregular bleeding   COMPARISON: 12/1/2020    TECHNIQUE: Endovaginal ultrasound was performed.    FINDINGS:     DAY OF CYCLE: Unknown    UTERUS: Anteverted and normal in size measuring 5.1 x 4.8 x 3.3 cm. No fibroids. The endometrial stripe measures 0.7 cm with small echogenic foci measuring up to 0.2 cm.    RIGHT OVARY: The ovary measures 3.0 x 1.2 x 1.6 cm.    LEFT OVARY: The ovary measures 3.4 x 1.4 x 1.5 cm.    No complex adnexal mass. No free fluid in the pelvis.      Impression    " IMPRESSION:  1.  Endometrial stripe measuring 0.7 cm with nonspecific scattered echogenic foci. If further evaluation is desired, hysteroscopy or hysterosonography should be considered.  2.  Unremarkable appearance of the ovaries.       ASSESSMENT/PLAN:                                                        ICD-10-CM    1. Irregular bleeding  N92.6          Discussed US  with Dr. Noel, recommends Hysteroscopy possible endometrial polyps.    PHYLLIS Simons [facility] [place]

## 2022-08-02 LAB
HUMAN PAPILLOMA VIRUS 16 DNA: NEGATIVE
HUMAN PAPILLOMA VIRUS 18 DNA: NEGATIVE
HUMAN PAPILLOMA VIRUS FINAL DIAGNOSIS: NORMAL
HUMAN PAPILLOMA VIRUS OTHER HR: NEGATIVE

## 2022-08-03 ENCOUNTER — PATIENT OUTREACH (OUTPATIENT)
Dept: OBGYN | Facility: CLINIC | Age: 27
End: 2022-08-03

## 2022-08-15 ENCOUNTER — TELEPHONE (OUTPATIENT)
Dept: OBGYN | Facility: CLINIC | Age: 27
End: 2022-08-15

## 2022-08-15 ENCOUNTER — PREP FOR PROCEDURE (OUTPATIENT)
Dept: OBGYN | Facility: CLINIC | Age: 27
End: 2022-08-15

## 2022-08-15 DIAGNOSIS — N93.9 ABNORMAL UTERINE BLEEDING: Primary | ICD-10-CM

## 2022-08-15 DIAGNOSIS — R93.89 ENDOMETRIAL THICKENING ON ULTRASOUND: ICD-10-CM

## 2022-08-15 NOTE — TELEPHONE ENCOUNTER
ERAS BAG GIVEN N/A  ERAS INSTRUCTIONS EXPLAINED N/A    ASSIST: none    H&P TO BE COMPLETED BY:   Surgeon  FOR H&P TO BE DONE BY SURGEON   UPDATE VISIT ON DATE AT HOSPITAL  SAME DAY/OBSERVATION/INPATIENT: STRAIGHT SAME DAY  EQUIPMENT: [employer]  VENDOR NEEDED AT CASE: yes  IF IUD WHAT BRAND n/a  LABS/SPECIAL INSTRUCTIONS: HCG on admission  TIME OFF WORK: 2 days  ESTIMATED DOCTOR TIME NEEDED IN MINUTES 60  POST OP TO BE SCHEDULED AT 4 WEEKS AFTER SX APPOINTMENT LENGTH IN MINUTES 20

## 2022-08-15 NOTE — TELEPHONE ENCOUNTER
Type of surgery: OPER HSC w/MYOSURE  Location of surgery: [facility] OR  Date and time of surgery: 9/7/2022 11:20a  Surgeon: JEANNINE  Pre-Op Appt Date: HOSPITAL  Post-Op Appt Date: 10/4/2022 1:30p   Packet sent out: MAILED 8/15/2022  Pre-cert/Authorization completed:  TBD  Date: 8/15/2022 Elizabeth via [facility] TEST HOME 9/5/2022    Bridget Zee  Surgery Scheduler    CPT 93141

## 2022-08-22 NOTE — TELEPHONE ENCOUNTER
Due to equipment issues this case time has changed as follows  Spoke to Consuelo at [facility] for changes    Myosure rep emailed     Bridget Zee  Surgery Scheduler

## 2022-08-25 NOTE — TELEPHONE ENCOUNTER
Due to pt request and issues with  drop off this case has been moved as follows    SX TIME 10a Arrival time 8a    LVM for pt to CB to confirm    Bridget Zee  Surgery Scheduler

## 2022-09-06 ENCOUNTER — ANESTHESIA EVENT (OUTPATIENT)
Dept: SURGERY | Facility: CLINIC | Age: 27
End: 2022-09-06
Payer: COMMERCIAL

## 2022-09-06 NOTE — OR NURSING
Called patient for preop call, pt upset about communication with start time.  Epic stated start time 0850, pt needs later start time.  Pt instructed caller to call the office to figure out the right information.  Spoke with Bridget at Dr Noel's office, start time changed to 10am, arrival time of 0800.  Called patient back, she is unsure if she can find an adult to stay with her overnight after surgery, pt will look for someone.  Instructed pt to call office if she needs to reschedule.  Surgery scheduling notified of time change and possibility of cancel.

## 2022-09-07 ENCOUNTER — HOSPITAL ENCOUNTER (OUTPATIENT)
Facility: CLINIC | Age: 27
Discharge: HOME | End: 2022-09-07
Attending: OBSTETRICS & GYNECOLOGY | Admitting: OBSTETRICS & GYNECOLOGY
Payer: COMMERCIAL

## 2022-09-07 ENCOUNTER — ANESTHESIA (OUTPATIENT)
Dept: SURGERY | Facility: CLINIC | Age: 27
End: 2022-09-07
Payer: COMMERCIAL

## 2022-09-07 VITALS
RESPIRATION RATE: 20 BRPM | HEIGHT: 64 IN | BODY MASS INDEX: 25.86 KG/M2 | OXYGEN SATURATION: 95 % | TEMPERATURE: 97 F | SYSTOLIC BLOOD PRESSURE: 110 MMHG | DIASTOLIC BLOOD PRESSURE: 61 MMHG | WEIGHT: 151.5 LBS | HEART RATE: 52 BPM

## 2022-09-07 DIAGNOSIS — Z98.890 STATUS POST HYSTEROSCOPY: Primary | ICD-10-CM

## 2022-09-07 LAB
B-HCG SERPL-ACNC: <1 IU/L (ref 0–5)
SARS-COV-2 RNA RESP QL NAA+PROBE: NEGATIVE

## 2022-09-07 PROCEDURE — 250N000011 HC RX IP 250 OP 636: Performed by: OBSTETRICS & GYNECOLOGY

## 2022-09-07 PROCEDURE — 710N000009 HC RECOVERY PHASE 1, LEVEL 1, PER MIN: Performed by: OBSTETRICS & GYNECOLOGY

## 2022-09-07 PROCEDURE — 36415 COLL VENOUS BLD VENIPUNCTURE: CPT | Performed by: OBSTETRICS & GYNECOLOGY

## 2022-09-07 PROCEDURE — 250N000013 HC RX MED GY IP 250 OP 250 PS 637: Performed by: OBSTETRICS & GYNECOLOGY

## 2022-09-07 PROCEDURE — 250N000009 HC RX 250: Performed by: SURGERY

## 2022-09-07 PROCEDURE — 272N000001 HC OR GENERAL SUPPLY STERILE: Performed by: OBSTETRICS & GYNECOLOGY

## 2022-09-07 PROCEDURE — 88342 IMHCHEM/IMCYTCHM 1ST ANTB: CPT | Mod: 26 | Performed by: PATHOLOGY

## 2022-09-07 PROCEDURE — 710N000012 HC RECOVERY PHASE 2, PER MINUTE: Performed by: OBSTETRICS & GYNECOLOGY

## 2022-09-07 PROCEDURE — 88341 IMHCHEM/IMCYTCHM EA ADD ANTB: CPT | Mod: 26 | Performed by: PATHOLOGY

## 2022-09-07 PROCEDURE — 999N000141 HC STATISTIC PRE-PROCEDURE NURSING ASSESSMENT: Performed by: OBSTETRICS & GYNECOLOGY

## 2022-09-07 PROCEDURE — 360N000076 HC SURGERY LEVEL 3, PER MIN: Performed by: OBSTETRICS & GYNECOLOGY

## 2022-09-07 PROCEDURE — 84702 CHORIONIC GONADOTROPIN TEST: CPT | Performed by: OBSTETRICS & GYNECOLOGY

## 2022-09-07 PROCEDURE — U0005 INFEC AGEN DETEC AMPLI PROBE: HCPCS | Performed by: OBSTETRICS & GYNECOLOGY

## 2022-09-07 PROCEDURE — 258N000003 HC RX IP 258 OP 636: Performed by: SURGERY

## 2022-09-07 PROCEDURE — 250N000025 HC SEVOFLURANE, PER MIN: Performed by: OBSTETRICS & GYNECOLOGY

## 2022-09-07 PROCEDURE — 250N000011 HC RX IP 250 OP 636: Performed by: SURGERY

## 2022-09-07 PROCEDURE — 370N000017 HC ANESTHESIA TECHNICAL FEE, PER MIN: Performed by: OBSTETRICS & GYNECOLOGY

## 2022-09-07 PROCEDURE — 88305 TISSUE EXAM BY PATHOLOGIST: CPT | Mod: 26 | Performed by: PATHOLOGY

## 2022-09-07 PROCEDURE — 88305 TISSUE EXAM BY PATHOLOGIST: CPT | Mod: TC | Performed by: OBSTETRICS & GYNECOLOGY

## 2022-09-07 PROCEDURE — 58558 HYSTEROSCOPY BIOPSY: CPT | Performed by: OBSTETRICS & GYNECOLOGY

## 2022-09-07 RX ORDER — KETOROLAC TROMETHAMINE 30 MG/ML
INJECTION, SOLUTION INTRAMUSCULAR; INTRAVENOUS PRN
Status: DISCONTINUED | OUTPATIENT
Start: 2022-09-07 | End: 2022-09-07

## 2022-09-07 RX ORDER — ONDANSETRON 2 MG/ML
4 INJECTION INTRAMUSCULAR; INTRAVENOUS EVERY 30 MIN PRN
Status: DISCONTINUED | OUTPATIENT
Start: 2022-09-07 | End: 2022-09-07 | Stop reason: HOSPADM

## 2022-09-07 RX ORDER — EPHEDRINE SULFATE 50 MG/ML
INJECTION, SOLUTION INTRAMUSCULAR; INTRAVENOUS; SUBCUTANEOUS PRN
Status: DISCONTINUED | OUTPATIENT
Start: 2022-09-07 | End: 2022-09-07

## 2022-09-07 RX ORDER — KETOROLAC TROMETHAMINE 15 MG/ML
15 INJECTION, SOLUTION INTRAMUSCULAR; INTRAVENOUS EVERY 6 HOURS PRN
Status: DISCONTINUED | OUTPATIENT
Start: 2022-09-07 | End: 2022-09-07 | Stop reason: HOSPADM

## 2022-09-07 RX ORDER — LIDOCAINE HYDROCHLORIDE 20 MG/ML
INJECTION, SOLUTION INFILTRATION; PERINEURAL PRN
Status: DISCONTINUED | OUTPATIENT
Start: 2022-09-07 | End: 2022-09-07

## 2022-09-07 RX ORDER — ACETAMINOPHEN 325 MG/1
975 TABLET ORAL ONCE
Status: DISCONTINUED | OUTPATIENT
Start: 2022-09-07 | End: 2022-09-07 | Stop reason: HOSPADM

## 2022-09-07 RX ORDER — FENTANYL CITRATE 50 UG/ML
INJECTION, SOLUTION INTRAMUSCULAR; INTRAVENOUS PRN
Status: DISCONTINUED | OUTPATIENT
Start: 2022-09-07 | End: 2022-09-07

## 2022-09-07 RX ORDER — ONDANSETRON 2 MG/ML
INJECTION INTRAMUSCULAR; INTRAVENOUS PRN
Status: DISCONTINUED | OUTPATIENT
Start: 2022-09-07 | End: 2022-09-07

## 2022-09-07 RX ORDER — PROPOFOL 10 MG/ML
INJECTION, EMULSION INTRAVENOUS PRN
Status: DISCONTINUED | OUTPATIENT
Start: 2022-09-07 | End: 2022-09-07

## 2022-09-07 RX ORDER — DEXAMETHASONE SODIUM PHOSPHATE 4 MG/ML
INJECTION, SOLUTION INTRA-ARTICULAR; INTRALESIONAL; INTRAMUSCULAR; INTRAVENOUS; SOFT TISSUE PRN
Status: DISCONTINUED | OUTPATIENT
Start: 2022-09-07 | End: 2022-09-07

## 2022-09-07 RX ORDER — CEFAZOLIN SODIUM/WATER 2 G/20 ML
2 SYRINGE (ML) INTRAVENOUS SEE ADMIN INSTRUCTIONS
Status: DISCONTINUED | OUTPATIENT
Start: 2022-09-07 | End: 2022-09-07 | Stop reason: HOSPADM

## 2022-09-07 RX ORDER — OXYCODONE HYDROCHLORIDE 5 MG/1
5 TABLET ORAL EVERY 4 HOURS PRN
Status: DISCONTINUED | OUTPATIENT
Start: 2022-09-07 | End: 2022-09-07 | Stop reason: HOSPADM

## 2022-09-07 RX ORDER — SODIUM CHLORIDE, SODIUM LACTATE, POTASSIUM CHLORIDE, CALCIUM CHLORIDE 600; 310; 30; 20 MG/100ML; MG/100ML; MG/100ML; MG/100ML
INJECTION, SOLUTION INTRAVENOUS CONTINUOUS
Status: DISCONTINUED | OUTPATIENT
Start: 2022-09-07 | End: 2022-09-07 | Stop reason: HOSPADM

## 2022-09-07 RX ORDER — ACETAMINOPHEN 325 MG/1
650 TABLET ORAL EVERY 4 HOURS PRN
Qty: 60 TABLET | Refills: 0 | Status: SHIPPED | OUTPATIENT
Start: 2022-09-07

## 2022-09-07 RX ORDER — IBUPROFEN 600 MG/1
600 TABLET, FILM COATED ORAL EVERY 6 HOURS PRN
Qty: 30 TABLET | Refills: 0 | Status: SHIPPED | OUTPATIENT
Start: 2022-09-07

## 2022-09-07 RX ORDER — OXYCODONE HYDROCHLORIDE 5 MG/1
5 TABLET ORAL
Status: COMPLETED | OUTPATIENT
Start: 2022-09-07 | End: 2022-09-07

## 2022-09-07 RX ORDER — FENTANYL CITRATE 0.05 MG/ML
25 INJECTION, SOLUTION INTRAMUSCULAR; INTRAVENOUS EVERY 5 MIN PRN
Status: DISCONTINUED | OUTPATIENT
Start: 2022-09-07 | End: 2022-09-07 | Stop reason: HOSPADM

## 2022-09-07 RX ORDER — IBUPROFEN 400 MG/1
800 TABLET, FILM COATED ORAL ONCE
Status: DISCONTINUED | OUTPATIENT
Start: 2022-09-07 | End: 2022-09-07 | Stop reason: HOSPADM

## 2022-09-07 RX ORDER — ONDANSETRON 4 MG/1
4 TABLET, ORALLY DISINTEGRATING ORAL EVERY 8 HOURS PRN
Qty: 4 TABLET | Refills: 0 | Status: SHIPPED | OUTPATIENT
Start: 2022-09-07 | End: 2023-03-17

## 2022-09-07 RX ORDER — ACETAMINOPHEN 325 MG/1
975 TABLET ORAL ONCE
Status: COMPLETED | OUTPATIENT
Start: 2022-09-07 | End: 2022-09-07

## 2022-09-07 RX ORDER — FENTANYL CITRATE 0.05 MG/ML
25 INJECTION, SOLUTION INTRAMUSCULAR; INTRAVENOUS
Status: DISCONTINUED | OUTPATIENT
Start: 2022-09-07 | End: 2022-09-07 | Stop reason: HOSPADM

## 2022-09-07 RX ORDER — DEXMEDETOMIDINE HYDROCHLORIDE 4 UG/ML
INJECTION, SOLUTION INTRAVENOUS PRN
Status: DISCONTINUED | OUTPATIENT
Start: 2022-09-07 | End: 2022-09-07

## 2022-09-07 RX ORDER — MEPERIDINE HYDROCHLORIDE 25 MG/ML
12.5 INJECTION INTRAMUSCULAR; INTRAVENOUS; SUBCUTANEOUS
Status: DISCONTINUED | OUTPATIENT
Start: 2022-09-07 | End: 2022-09-07 | Stop reason: HOSPADM

## 2022-09-07 RX ORDER — ONDANSETRON 4 MG/1
4 TABLET, ORALLY DISINTEGRATING ORAL EVERY 30 MIN PRN
Status: DISCONTINUED | OUTPATIENT
Start: 2022-09-07 | End: 2022-09-07 | Stop reason: HOSPADM

## 2022-09-07 RX ORDER — SODIUM CHLORIDE, SODIUM LACTATE, POTASSIUM CHLORIDE, CALCIUM CHLORIDE 600; 310; 30; 20 MG/100ML; MG/100ML; MG/100ML; MG/100ML
INJECTION, SOLUTION INTRAVENOUS CONTINUOUS PRN
Status: DISCONTINUED | OUTPATIENT
Start: 2022-09-07 | End: 2022-09-07

## 2022-09-07 RX ORDER — HYDROMORPHONE HCL IN WATER/PF 6 MG/30 ML
0.2 PATIENT CONTROLLED ANALGESIA SYRINGE INTRAVENOUS EVERY 5 MIN PRN
Status: DISCONTINUED | OUTPATIENT
Start: 2022-09-07 | End: 2022-09-07 | Stop reason: HOSPADM

## 2022-09-07 RX ORDER — CEFAZOLIN SODIUM/WATER 2 G/20 ML
2 SYRINGE (ML) INTRAVENOUS
Status: COMPLETED | OUTPATIENT
Start: 2022-09-07 | End: 2022-09-07

## 2022-09-07 RX ADMIN — OXYCODONE HYDROCHLORIDE 5 MG: 5 TABLET ORAL at 11:23

## 2022-09-07 RX ADMIN — FENTANYL CITRATE 25 MCG: 50 INJECTION, SOLUTION INTRAMUSCULAR; INTRAVENOUS at 10:55

## 2022-09-07 RX ADMIN — FENTANYL CITRATE 75 MCG: 50 INJECTION, SOLUTION INTRAMUSCULAR; INTRAVENOUS at 10:11

## 2022-09-07 RX ADMIN — FENTANYL CITRATE 25 MCG: 50 INJECTION, SOLUTION INTRAMUSCULAR; INTRAVENOUS at 10:16

## 2022-09-07 RX ADMIN — DEXAMETHASONE SODIUM PHOSPHATE 4 MG: 4 INJECTION, SOLUTION INTRA-ARTICULAR; INTRALESIONAL; INTRAMUSCULAR; INTRAVENOUS; SOFT TISSUE at 10:14

## 2022-09-07 RX ADMIN — ONDANSETRON 4 MG: 2 INJECTION INTRAMUSCULAR; INTRAVENOUS at 10:33

## 2022-09-07 RX ADMIN — ACETAMINOPHEN 975 MG: 325 TABLET ORAL at 09:57

## 2022-09-07 RX ADMIN — SODIUM CHLORIDE, POTASSIUM CHLORIDE, SODIUM LACTATE AND CALCIUM CHLORIDE: 600; 310; 30; 20 INJECTION, SOLUTION INTRAVENOUS at 10:06

## 2022-09-07 RX ADMIN — FENTANYL CITRATE 25 MCG: 50 INJECTION, SOLUTION INTRAMUSCULAR; INTRAVENOUS at 11:03

## 2022-09-07 RX ADMIN — PROPOFOL 200 MG: 10 INJECTION, EMULSION INTRAVENOUS at 10:11

## 2022-09-07 RX ADMIN — DEXMEDETOMIDINE HYDROCHLORIDE 12 MCG: 200 INJECTION INTRAVENOUS at 10:08

## 2022-09-07 RX ADMIN — LIDOCAINE HYDROCHLORIDE 100 MG: 20 INJECTION, SOLUTION INFILTRATION; PERINEURAL at 10:11

## 2022-09-07 RX ADMIN — Medication 5 MG: at 10:32

## 2022-09-07 RX ADMIN — Medication 2 G: at 10:10

## 2022-09-07 RX ADMIN — ONDANSETRON 4 MG: 2 INJECTION INTRAMUSCULAR; INTRAVENOUS at 12:04

## 2022-09-07 RX ADMIN — KETOROLAC TROMETHAMINE 15 MG: 30 INJECTION, SOLUTION INTRAMUSCULAR at 10:33

## 2022-09-07 ASSESSMENT — ACTIVITIES OF DAILY LIVING (ADL)
ADLS_ACUITY_SCORE: 35
ADLS_ACUITY_SCORE: 35

## 2022-09-07 NOTE — LETTER
[facility] PREOP/PHASE II  [address]  [facility] [address]  Phone: [phone]    September 7, 2022        Hansa Ortiz  [address]  [place] [address]          To whom it may concern:    RE: Hansa Ortiz    Patient may return to work Saturday 9/10/2022 with no restrictions.   Please contact me for questions or concerns.      Sincerely,    Dr. Rayna Noel

## 2022-09-07 NOTE — ANESTHESIA CARE TRANSFER NOTE
Patient: Hansa Ortiz    Procedure: Procedure(s):  OPERATIVE HYSTEROSCOPY WITH MORCELLATOR (MYOSURE)       Diagnosis: Abnormal uterine bleeding [N93.9]  Endometrial thickening on ultrasound [R93.89]  Diagnosis Additional Information: No value filed.    Anesthesia Type:   General     Note:    Oropharynx: oropharynx clear of all foreign objects and spontaneously breathing  Level of Consciousness: awake  Oxygen Supplementation: face mask  Level of Supplemental Oxygen (L/min / FiO2): 6  Independent Airway: airway patency satisfactory and stable  Dentition: dentition unchanged  Vital Signs Stable: post-procedure vital signs reviewed and stable  Report to RN Given: handoff report given  Patient transferred to: PACU  Comments: At end of procedure, spontaneous respirations, adequate tidal volumes, followed commands to voice, LMA removed atraumatically, oropharynx suctioned, airway patent after LMA removal. Oxygen via facemask at 6 liters per minute to PACU. Oxygen tubing connected to wall O2 in PACU, SpO2, NiBP, and EKG monitors and alarms on and functioning, Aris Hugger warmer connected to patient gown, report on patient's clinical status given to PACU RN, RN questions answered.  Handoff Report: Identifed the Patient, Identified the Reponsible Provider, Reviewed the pertinent medical history, Discussed the surgical course, Reviewed Intra-OP anesthesia mangement and issues during anesthesia, Set expectations for post-procedure period and Allowed opportunity for questions and acknowledgement of understanding      Vitals:  Vitals Value Taken Time   BP     Temp     Pulse 69 09/07/22 1048   Resp 10 09/07/22 1048   SpO2 100 % 09/07/22 1048   Vitals shown include unvalidated device data.    Electronically Signed By: Greta Suazo  September 7, 2022  10:49 AM

## 2022-09-07 NOTE — OR NURSING
PATIENT STATES THAT HER HOME COVID TEST WAS NEGATIVE BUT SHE DID NOT TAKE A PICTURE OF IT.  PCR TEST PERFORMED IN PRE-OP ROOM.  PATIENT STATES THAT SHE AND HER CHILDREN WILL BE STAYING AT HER MOTHER'S HOUSE AFTER DISCHARGE FROM THE HOSPITAL TODAY.

## 2022-09-07 NOTE — OP NOTE
Procedure Date: 09/07/2022    PREOPERATIVE DIAGNOSES:    1.  Abnormal uterine bleeding.  2.  Endometrial thickening on ultrasound.    POSTOPERATIVE DIAGNOSES:   1.  Abnormal uterine bleeding.  2.  Endometrial thickening on ultrasound.    PROCEDURE PERFORMED:  Operative hysteroscopy with morcellator MyoSure.    PRIMARY SURGEON:  Rayna Noel MD.    ANESTHESIA:  General.    ESTIMATED BLOOD LOSS:  10 mL.    FLUID DEFICIT:  200 mL.    URINE OUTPUT:  150 mL of urine at beginning of the case.    SPECIMENS:  Endometrial curettings.    FINDINGS:  Polypoid endometrial lining with a distinct area of calcifications on the posterior endometrium.    COMPLICATIONS:  None apparent at time of dictation.    INDICATIONS FOR PROCEDURE:  Hansa is a 27-year-old para 2, who has been having breakthrough bleeding on Depo-Provera.  On her pelvic ultrasound at beginning of August, she had what appeared to be a thin lining; however, she had distinct areas of hyperechogenicity on ultrasound.  She was given a recommendation for hysteroscopy for diagnosis and for treatment.    DESCRIPTION OF PROCEDURE:  The patient was taken to the operating room, IV fluids running.  She received Ancef for infection prophylaxis.  She was then placed under general anesthesia in high lithotomy.  She was prepped and draped in the usual sterile fashion.  A final timeout was completed.  The bladder was emptied of 150 mL of urine.  Bayard speculum was placed in the vaginal vault.  The anterior lip of the cervix was grasped.  The cervix was then sequentially dilated to accommodate the hysteroscope.  Upon advancing the hysteroscope into the endometrial cavity, the entire endometrium appeared polypoid, which was a surprising finding, given the fact that she was on Depo-Provera.  On the posterior endometrium, there were 3 distinct areas of what appeared to be calcifications.  The MyoSure device was then introduced and used to perform a circumferential curettage  targeting those areas of calcification.  After the hysteroscope was removed, sharp curettage was performed and this was also labeled and sent to pathology as well.  At the end of the procedure, the tenaculum was removed, there was no bleeding noted from the insertion site.  The speculum was removed and the procedure was completed.    Rayna Noel MD        D: 2022   T: 2022   MT: SHEYLA    Name:     CAMRYN ORTEGA  MRN:      [id number]        Account:        [id number]   :      1995           Procedure Date: 2022     Document: [id number]

## 2022-09-07 NOTE — DISCHARGE INSTRUCTIONS
Today you were given 975 mg of Tylenol at 10:00am. The recommended daily maximum dose is 4000 mg.      Today you received Toradol, an antiinflammatory medication similar to Ibuprofen.  You should not take other antiinflammatory medication, such as Ibuprofen, Motrin, Advil, Aleve, Naprosyn, etc until 4:30.        OXYCODONE 5 MG GIVEN AT 11:25 AM      Same Day Surgery Discharge Instructions for  Sedation and General Anesthesia     It's not unusual to feel dizzy, light-headed or faint for up to 24 hours after surgery or while taking pain medication.  If you have these symptoms: sit for a few minutes before standing and have someone assist you when you get up to walk or use the bathroom.    You should rest and relax for the next 24 hours. We recommend you make arrangements to have an adult stay with you for at least 24 hours after your discharge.  Avoid hazardous and strenuous activity.    DO NOT DRIVE any vehicle or operate mechanical equipment for 24 hours following the end of your surgery.  Even though you may feel normal, your reactions may be affected by the medication you have received.    Do not drink alcoholic beverages for 24 hours following surgery.     Slowly progress to your regular diet as you feel able. It's not unusual to feel nauseated and/or vomit after receiving anesthesia.  If you develop these symptoms, drink clear liquids (apple juice, ginger ale, broth, 7-up, etc. ) until you feel better.  If your nausea and vomiting persists for 24 hours, please notify your surgeon.      All narcotic pain medications, along with inactivity and anesthesia, can cause constipation. Drinking plenty of liquids and increasing fiber intake will help.    For any questions of a medical nature, call your surgeon.    Do not make important decisions for 24 hours.    If you had general anesthesia, you may have a sore throat for a couple of days related to the breathing tube used during surgery.  You may use Cepacol lozenges to  help with this discomfort.  If it worsens or if you develop a fever, contact your surgeon.     If you feel your pain is not well managed with the pain medications prescribed by your surgeon, please contact your surgeon's office to let them know so they can address your concerns.

## 2022-09-07 NOTE — BRIEF OP NOTE
[facility]    Brief Operative Note    Pre-operative diagnosis: Abnormal uterine bleeding [N93.9]  Endometrial thickening on ultrasound [R93.89]  Post-operative diagnosis Same as pre-operative diagnosis    Procedure: Procedure(s):  OPERATIVE HYSTEROSCOPY WITH MORCELLATOR (MYOSURE)  Surgeon: Surgeon(s) and Role:     * Rayna Noel MD - Primary  Anesthesia: General   Estimated Blood Loss: 10 mL from 9/7/2022 10:06 AM to 9/7/2022 10:42 AM  Fluid Deficit: 200 ml    Drains: None  Specimens:   ID Type Source Tests Collected by Time Destination   1 : ENDOMETRIAL CURETTINGS Tissue Endometrium SURGICAL PATHOLOGY EXAM Rayna Noel MD 9/7/2022 10:32 AM      Findings:   Polypoid endometrial lining with distinct areas of calcifications on the posterior endometrium..  Complications: None.  Implants: None

## 2022-09-07 NOTE — ANESTHESIA POSTPROCEDURE EVALUATION
Patient: Hansa Ortiz    Procedure: Procedure(s):  OPERATIVE HYSTEROSCOPY WITH MORCELLATOR (MYOSURE)       Anesthesia Type:  General    Note:  Disposition: Outpatient   Postop Pain Control: Uneventful            Sign Out: Well controlled pain   PONV: No   Neuro/Psych: Uneventful            Sign Out: Acceptable/Baseline neuro status   Airway/Respiratory: Uneventful            Sign Out: Acceptable/Baseline resp. status   CV/Hemodynamics: Uneventful            Sign Out: Acceptable CV status   Other NRE: NONE   DID A NON-ROUTINE EVENT OCCUR? No           Last vitals:  Vitals Value Taken Time   /68 09/07/22 1145   Temp     Pulse 61 09/07/22 1150   Resp 19 09/07/22 1150   SpO2 97 % 09/07/22 1153   Vitals shown include unvalidated device data.    Electronically Signed By: Juan A Lugo MD  September 7, 2022  2:34 PM
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
## 2022-09-07 NOTE — H&P
[facility]    History and Physical  Obstetrics and Gynecology     Date of Admission:  2022    Assessment & Plan   Hansa Ortiz is a 27 year old female who presents for an operative hysteroscopy for breakthrough bleeding on Depo provera.     ASSESSMENT:   Breakthrough bleeding.      PLAN:   Will proceed with scheduled procedure      Rayna Noel MD    History of Present Illness   Hansa Ortiz is a 27 year old female  who presents for an operative hysteroscopy for breakthrough bleeding on Depo provera with calcifications seen on sonogram.      Prior to Admission Medications   Prior to Admission Medications   Prescriptions Last Dose Informant Patient Reported? Taking?   acetaminophen (TYLENOL) 325 MG tablet Past Month at Unknown time  No Yes   Sig: Take 2 tablets (650 mg) by mouth every 4 hours as needed for mild pain   cyclobenzaprine (FLEXERIL) 10 MG tablet 2022 at Unknown time  Yes Yes   Sig: Take 10 mg by mouth   ibuprofen (ADVIL/MOTRIN) 600 MG tablet Past Month at Unknown time  Yes Yes   Sig: TAKE 1 TABLET BY MOUTH EVERY 8 HOURS AS NEEDED      Facility-Administered Medications: None     Allergies   No Known Allergies      Immunization History   Immunization History   Administered Date(s) Administered     MMR 04/10/2021     Tdap (Adacel,Boostrix) 2021       Past Medical History:   Diagnosis Date     Abnormal uterine bleeding      Back pain     [place].  Has on going mid back pain     OSIE III (cervical intraepithelial neoplasia III) 2019 ASCUS 16 ASCUS 10/5/17 ASCUS 18 HSIL 18 Colpo ECC OSEI II, bx cervicitis 1/3/19 LEEP OSEI II-III completely excised, ECC neg 20 NIL pap, neg HPV 21 NIL pap, +HR HPV (not 16/18). Plan: colposcopy due before 21     Hx of abnormal cervical Pap smear      Trichomonas infection 2020    treated       Past Surgical History:   Procedure Laterality  Date     LEEP TX, CERVICAL  01/01/2019     Vital signs: see Epic    Constitutional: healthy, alert, active and no distress   Extremities: NT, no edema  Neurologic: Awake, alert, oriented x3  Neuropsychiatric: General: normal, calm and normal eye contact  Heart: Regular rate and rhythm  Lungs: clear to ausculation bilaterally    Rayna Noel MD

## 2022-09-07 NOTE — ANESTHESIA PROCEDURE NOTES
Airway       Patient location during procedure: OR  Staff -        Anesthesiologist:  Juan A Lugo MD       CRNA: Greta Suazo       Performed By: CRNA  Consent for Airway        Urgency: elective  Indications and Patient Condition       Indications for airway management: layla-procedural       Induction type:intravenous       Mask difficulty assessment: 0 - not attempted    Final Airway Details       Final airway type: supraglottic airway    Supraglottic Airway Details        Type: LMA       Brand: I-Gel       LMA size: 4    Post intubation assessment        Placement verified by: capnometry, equal breath sounds and chest rise        Number of attempts at approach: 1       Number of other approaches attempted: 0       Secured with: pink tape       Ease of procedure: easy       Dentition: Intact and Unchanged

## 2022-09-09 LAB
PATH REPORT.COMMENTS IMP SPEC: NORMAL
PATH REPORT.FINAL DX SPEC: NORMAL
PATH REPORT.GROSS SPEC: NORMAL
PATH REPORT.MICROSCOPIC SPEC OTHER STN: NORMAL
PATH REPORT.MICROSCOPIC SPEC OTHER STN: NORMAL
PATH REPORT.RELEVANT HX SPEC: NORMAL
PHOTO IMAGE: NORMAL

## 2022-10-04 ENCOUNTER — OFFICE VISIT (OUTPATIENT)
Dept: OBGYN | Facility: CLINIC | Age: 27
End: 2022-10-04
Payer: COMMERCIAL

## 2022-10-04 VITALS
SYSTOLIC BLOOD PRESSURE: 110 MMHG | BODY MASS INDEX: 25.27 KG/M2 | WEIGHT: 148 LBS | DIASTOLIC BLOOD PRESSURE: 60 MMHG | HEIGHT: 64 IN

## 2022-10-04 DIAGNOSIS — N93.9 ABNORMAL UTERINE BLEEDING: ICD-10-CM

## 2022-10-04 DIAGNOSIS — Z11.3 SCREENING FOR STDS (SEXUALLY TRANSMITTED DISEASES): ICD-10-CM

## 2022-10-04 DIAGNOSIS — Z01.419 ENCOUNTER FOR GYNECOLOGICAL EXAMINATION WITHOUT ABNORMAL FINDING: Primary | ICD-10-CM

## 2022-10-04 DIAGNOSIS — Z30.42 ENCOUNTER FOR DEPO-PROVERA CONTRACEPTION: ICD-10-CM

## 2022-10-04 PROBLEM — Z36.89 ENCOUNTER FOR TRIAGE IN PREGNANT PATIENT: Status: RESOLVED | Noted: 2021-02-15 | Resolved: 2022-10-04

## 2022-10-04 PROBLEM — O21.0 MILD HYPEREMESIS GRAVIDARUM: Status: ACTIVE | Noted: 2020-08-25

## 2022-10-04 PROBLEM — O21.0 MILD HYPEREMESIS GRAVIDARUM: Status: RESOLVED | Noted: 2020-08-25 | Resolved: 2022-10-04

## 2022-10-04 PROBLEM — F12.91 HISTORY OF MARIJUANA USE: Status: ACTIVE | Noted: 2020-08-25

## 2022-10-04 PROBLEM — F32.A ANXIETY AND DEPRESSION: Status: ACTIVE | Noted: 2020-08-25

## 2022-10-04 PROBLEM — Z98.890 H/O LEEP: Status: ACTIVE | Noted: 2020-08-25

## 2022-10-04 PROBLEM — O99.340 MATERNAL MENTAL DISORDER, ANTEPARTUM: Status: ACTIVE | Noted: 2020-08-25

## 2022-10-04 PROBLEM — O09.91 SUPERVISION OF HIGH RISK PREGNANCY IN FIRST TRIMESTER: Status: RESOLVED | Noted: 2020-08-25 | Resolved: 2022-10-04

## 2022-10-04 PROBLEM — O09.91 SUPERVISION OF HIGH RISK PREGNANCY IN FIRST TRIMESTER: Status: ACTIVE | Noted: 2020-08-25

## 2022-10-04 PROBLEM — F41.9 ANXIETY AND DEPRESSION: Status: ACTIVE | Noted: 2020-08-25

## 2022-10-04 PROBLEM — Z60.9 POOR SOCIAL SITUATION: Status: ACTIVE | Noted: 2020-08-25

## 2022-10-04 PROBLEM — Z82.49 FAMILY HISTORY OF DEEP VENOUS THROMBOSIS: Status: ACTIVE | Noted: 2022-10-04

## 2022-10-04 PROBLEM — F43.9 SITUATIONAL STRESS: Status: ACTIVE | Noted: 2018-11-09

## 2022-10-04 PROCEDURE — 87591 N.GONORRHOEAE DNA AMP PROB: CPT | Performed by: NURSE PRACTITIONER

## 2022-10-04 PROCEDURE — 96372 THER/PROPH/DIAG INJ SC/IM: CPT | Performed by: NURSE PRACTITIONER

## 2022-10-04 PROCEDURE — 87491 CHLMYD TRACH DNA AMP PROBE: CPT | Performed by: NURSE PRACTITIONER

## 2022-10-04 PROCEDURE — 99395 PREV VISIT EST AGE 18-39: CPT | Mod: 25 | Performed by: NURSE PRACTITIONER

## 2022-10-04 RX ORDER — MEDROXYPROGESTERONE ACETATE 150 MG/ML
150 INJECTION, SUSPENSION INTRAMUSCULAR
Status: ACTIVE | OUTPATIENT
Start: 2022-10-04 | End: 2023-09-29

## 2022-10-04 RX ADMIN — MEDROXYPROGESTERONE ACETATE 150 MG: 150 INJECTION, SUSPENSION INTRAMUSCULAR at 15:09

## 2022-10-04 NOTE — PROGRESS NOTES
"Hansa is a 27 year old  female who presents for annual exam.     Besides routine health maintenance, she has no other health concerns today .    HPI:  The patient's PCP is [facility].  Patient here today for her annual GYN exam.  She had a negative Pap screening in July of this year.  She does have a history of OSEI-3.    She continues to use Depo-Provera and have breakthrough bleeding.  She had a hysteroscopy last month for thickened endometrial tissue.  She states that she will have bright red bleeding with some clotting and her cramping is getting worse.  She is due for Depo in 2 days and we will complete for her today.    She is frustrated with the fact that she continues to bleed.  She did miss her postop appointment today and is looking to reschedule.      GYNECOLOGIC HISTORY:    No LMP recorded.    Regular menses? no  Menses every ? days.  Length of menses: I DON\"T KNOW days    Her current contraception method is: depo or other injectable.  She  reports that she has never smoked. She has never used smokeless tobacco.    Patient is sexually active.  STD testing offered?  Declined  Last PHQ-9 score on record =   PHQ-9 SCORE 2021   PHQ-9 Total Score 2     Last GAD7 score on record =   KIRTI-7 SCORE 2021   Total Score 0     Alcohol Score =     HEALTH MAINTENANCE:  Cholesterol: (No results found for: CHOL   Last Mammo: ?, Result: Normal, Next Mammo: Due at age 38   Pap:  Lab Results   Component Value Date    GYNINTERP  2022     Negative for Intraepithelial Lesion or Malignancy (NILM)    PAP NIL 2021    PAP NILM 2020     Colonoscopy:  NA, Result: Not applicable, Next Colonoscopy: 45 years.  Dexa:  NA    Health maintenance updated:  yes    HISTORY:  OB History    Para Term  AB Living   4 2 2 0 2 2   SAB IAB Ectopic Multiple Live Births   0 2 0 0 2      # Outcome Date GA Lbr Michael/2nd Weight Sex Delivery Anes PTL Lv   4 Term 21 40w2d 06:40 / 01:56 3.95 " kg (8 lb 11.3 oz) M Vag-Vacuum EPI N INDU      Complications: Fetal Intolerance      Name: esther      Apgar1: 7  Apgar5: 9   3 IAB 2015           2 Term 04/12/12   3.515 kg (7 lb 12 oz) F Vag-Spont  Y INDU   1 IAB 2012               Patient Active Problem List   Diagnosis     OSEI III (cervical intraepithelial neoplasia III)     Anxiety and depression     Family history of deep venous thrombosis     H/O LEEP     History of marijuana use     Maternal mental disorder, antepartum     Poor social situation     Situational stress     Past Surgical History:   Procedure Laterality Date     LEEP TX, CERVICAL  01/01/2019     OPERATIVE HYSTEROSCOPY WITH MORCELLATOR N/A 9/7/2022    Procedure: OPERATIVE HYSTEROSCOPY WITH MORCELLATOR (MYOSURE);  Surgeon: Rayna Noel MD;  Location:  OR      Social History     Tobacco Use     Smoking status: Never Smoker     Smokeless tobacco: Never Used   Substance Use Topics     Alcohol use: Yes     Comment: wine 1-2x/ week      Problem (# of Occurrences) Relation (Name,Age of Onset)    Clotting Disorder (1) Mother            Current Outpatient Medications   Medication Sig     acetaminophen (TYLENOL) 325 MG tablet Take 2 tablets (650 mg) by mouth every 4 hours as needed for mild pain     cyclobenzaprine (FLEXERIL) 10 MG tablet Take 10 mg by mouth     ibuprofen (ADVIL/MOTRIN) 600 MG tablet Take 1 tablet (600 mg) by mouth every 6 hours as needed for moderate pain     ondansetron (ZOFRAN ODT) 4 MG ODT tab Take 1 tablet (4 mg) by mouth every 8 hours as needed for nausea (Patient not taking: Reported on 10/4/2022)     Current Facility-Administered Medications   Medication     medroxyPROGESTERone (DEPO-PROVERA) injection 150 mg     No Known Allergies    Past medical, surgical, social and family histories were reviewed and updated in EPIC.    ROS:   12 point review of systems negative other than symptoms noted below or in the HPI.  Genitourinary: Irregular Menses  No urinary frequency  "or dysuria, bladder or kidney problems, POSITIVE for:, irregular menses    EXAM:  /60   Ht 1.619 m (5' 3.75\")   Wt 67.1 kg (148 lb)   Breastfeeding No   BMI 25.60 kg/m     BMI: Body mass index is 25.6 kg/m .    PHYSICAL EXAM:  Constitutional:   Appearance: Well nourished, well developed, alert, in no acute distress  Neck:  Lymph Nodes:  No lymphadenopathy present    Thyroid:  Gland size normal, nontender, no nodules or masses present  on palpation  Chest:  Respiratory Effort:  Breathing unlabored  Cardiovascular:    Heart: Auscultation:  Regular rate, normal rhythm, no murmurs present  Breasts: Inspection of Breasts:  No lymphadenopathy present., Palpation of Breasts and Axillae:  No masses present on palpation, no breast tenderness., Axillary Lymph Nodes:  No lymphadenopathy present. and No nodularity, asymmetry or nipple discharge bilaterally.  Gastrointestinal:   Abdominal Examination:  Abdomen nontender to palpation, tone normal without rigidity or guarding, no masses present, umbilicus without lesions   Liver and Spleen:  No hepatomegaly present, liver nontender to palpation    Hernias:  No hernias present  Lymphatic: Lymph Nodes:  No other lymphadenopathy present  Skin:  General Inspection:  No rashes present, no lesions present, no areas of  discoloration  Neurologic:    Mental Status:  Oriented X3.  Normal strength and tone, sensory exam                grossly normal, mentation intact and speech normal.    Psychiatric:   Mentation appears normal and affect normal/bright.         Pelvic Exam:  External Genitalia:     Normal appearance for age, no discharge present, no tenderness present, no inflammatory lesions present, color normal  Vagina:     Normal vaginal vault without central or paravaginal defects, no discharge present, no inflammatory lesions present, no masses present  Bladder:     Nontender to palpation  Urethra:   Urethral Body:  Urethra palpation normal, urethra structural support " normal   Urethral Meatus:  No erythema or lesions present  Cervix:     Appearance healthy, no lesions present, nontender to palpation, minimal bright red bleeding present.  Status post LEEP   uterus:     Uterus: firm, normal sized and nontender, anteverted in position.   Adnexa:     No adnexal tenderness present, no adnexal masses present  Perineum:     Perineum within normal limits, no evidence of trauma, no rashes or skin lesions present  Anus:     Anus within normal limits, no hemorrhoids present  Inguinal Lymph Nodes:     No lymphadenopathy present  Pubic Hair:     Normal pubic hair distribution for age  Genitalia and Groin:     No rashes present, no lesions present, no areas of discoloration, no masses present      COUNSELING:   Special attention given to:        Regular exercise       Healthy diet/nutrition       Contraception    BMI: Body mass index is 25.6 kg/m .  Weight management plan: Discussed healthy diet and exercise guidelines    ASSESSMENT:  27 year old female with satisfactory annual exam.    ICD-10-CM    1. Encounter for gynecological examination without abnormal finding  Z01.419    2. Abnormal uterine bleeding  N93.9 medroxyPROGESTERone (DEPO-PROVERA) injection 150 mg   3. Encounter for Depo-Provera contraception  Z30.42 medroxyPROGESTERone (DEPO-PROVERA) injection 150 mg       PLAN:  27-year-old female with a normal GYN exam.  Pap smear was not updated as she had a negative in July of this year.  We did update her Depo-Provera injection.  We briefly discussed management of her ongoing bleeding and possibly adding a low-dose birth control pill for a month or so to stop her bleeding.  We will have her reschedule her postop visit with  when it works for her schedule.    PHYLLIS Fraser CNP

## 2022-10-04 NOTE — PROGRESS NOTES
BP: 110/60    LAST PAP/EXAM:   Lab Results   Component Value Date    PAP NIL 05/25/2021     URINE HCG:not indicated    The following medication was given:     MEDICATION: Depo Provera 150mg  ROUTE: IM  SITE: Ventrogluteal - Right  : Myjordan  LOT #: [id number]  EXP:04/2024  NEXT INJECTION DUE: 12/20/22 - 1/3/23   Provider: KENDRICK Mills

## 2022-10-06 LAB
C TRACH DNA SPEC QL NAA+PROBE: NEGATIVE
N GONORRHOEA DNA SPEC QL NAA+PROBE: NEGATIVE

## 2022-10-11 ENCOUNTER — OFFICE VISIT (OUTPATIENT)
Dept: OBGYN | Facility: CLINIC | Age: 27
End: 2022-10-11
Payer: COMMERCIAL

## 2022-10-11 VITALS
BODY MASS INDEX: 26.47 KG/M2 | WEIGHT: 149.4 LBS | SYSTOLIC BLOOD PRESSURE: 104 MMHG | HEIGHT: 63 IN | DIASTOLIC BLOOD PRESSURE: 66 MMHG

## 2022-10-11 DIAGNOSIS — Z98.890 POSTOPERATIVE STATE: Primary | ICD-10-CM

## 2022-10-11 PROCEDURE — 99212 OFFICE O/P EST SF 10 MIN: CPT | Performed by: OBSTETRICS & GYNECOLOGY

## 2022-10-11 ASSESSMENT — ANXIETY QUESTIONNAIRES
GAD7 TOTAL SCORE: 0
6. BECOMING EASILY ANNOYED OR IRRITABLE: NOT AT ALL
7. FEELING AFRAID AS IF SOMETHING AWFUL MIGHT HAPPEN: NOT AT ALL
2. NOT BEING ABLE TO STOP OR CONTROL WORRYING: NOT AT ALL
1. FEELING NERVOUS, ANXIOUS, OR ON EDGE: NOT AT ALL
5. BEING SO RESTLESS THAT IT IS HARD TO SIT STILL: NOT AT ALL
3. WORRYING TOO MUCH ABOUT DIFFERENT THINGS: NOT AT ALL
IF YOU CHECKED OFF ANY PROBLEMS ON THIS QUESTIONNAIRE, HOW DIFFICULT HAVE THESE PROBLEMS MADE IT FOR YOU TO DO YOUR WORK, TAKE CARE OF THINGS AT HOME, OR GET ALONG WITH OTHER PEOPLE: NOT DIFFICULT AT ALL
GAD7 TOTAL SCORE: 0

## 2022-10-11 ASSESSMENT — PATIENT HEALTH QUESTIONNAIRE - PHQ9
5. POOR APPETITE OR OVEREATING: NOT AT ALL
SUM OF ALL RESPONSES TO PHQ QUESTIONS 1-9: 2

## 2022-10-11 NOTE — PROGRESS NOTES
SUBJECTIVE:                                                   Hansa Ortiz is a 27 year old female who presents to clinic today for the following health issue(s):  Patient presents with:  Post-op Visit: Concern is how long was she bleeding      Additional information: discuss pathology    HPI:  Hansa presents for follow up. She had a hysteroscopic resection 3 weeks ago with the final pathology showing myometrium with placental site nodules. No Gestational trophoblastic disease as no trophoblasts were actually seen. Prior to surgery she had breakthrough bleeding on Depo and her pelvic ultrasound showed possible polyps. After surgery her bleeding was heavy and just now abated. She received IM Depo Provera last week and stopped bleeding.    No LMP recorded..     Patient is sexually active, .  Using depo or other injectable for contraception.    reports that she has never smoked. She has never used smokeless tobacco.    STD testing offered?  Declined    Health maintenance updated:  no    Today's PHQ-2 Score:   PHQ-2 (  [employer]) 10/11/2022   Q1: Little interest or pleasure in doing things 1   Q2: Feeling down, depressed or hopeless 1   PHQ-2 Score 2     Today's PHQ-9 Score:   PHQ-9 SCORE 10/11/2022   PHQ-9 Total Score 2     Today's KIRTI-7 Score:   KIRTI-7 SCORE 10/11/2022   Total Score 0       Problem list and histories reviewed & adjusted, as indicated.  Additional history: as documented.    Patient Active Problem List   Diagnosis     OSEI III (cervical intraepithelial neoplasia III)     Anxiety and depression     Family history of deep venous thrombosis     H/O LEEP     History of marijuana use     Maternal mental disorder, antepartum     Poor social situation     Situational stress     Past Surgical History:   Procedure Laterality Date     LEEP TX, CERVICAL  2019     OPERATIVE HYSTEROSCOPY WITH MORCELLATOR N/A 2022    Procedure: OPERATIVE HYSTEROSCOPY WITH MORCELLATOR (MYOSURE);  Surgeon: Sadie  "Rayna Stanford MD;  Location:  OR      Social History     Tobacco Use     Smoking status: Never     Smokeless tobacco: Never   Substance Use Topics     Alcohol use: Yes     Comment: wine 1-2x/ week      Problem (# of Occurrences) Relation (Name,Age of Onset)    Clotting Disorder (1) Mother            Current Outpatient Medications   Medication Sig     acetaminophen (TYLENOL) 325 MG tablet Take 2 tablets (650 mg) by mouth every 4 hours as needed for mild pain     cyclobenzaprine (FLEXERIL) 10 MG tablet Take 10 mg by mouth     ibuprofen (ADVIL/MOTRIN) 600 MG tablet Take 1 tablet (600 mg) by mouth every 6 hours as needed for moderate pain     ondansetron (ZOFRAN ODT) 4 MG ODT tab Take 1 tablet (4 mg) by mouth every 8 hours as needed for nausea (Patient not taking: No sig reported)     Current Facility-Administered Medications   Medication     medroxyPROGESTERone (DEPO-PROVERA) injection 150 mg     No Known Allergies    ROS:  12 point review of systems negative other than symptoms noted below or in the HPI.  No urinary frequency or dysuria, bladder or kidney problems      OBJECTIVE:     /66   Ht 1.6 m (5' 3\")   Wt 67.8 kg (149 lb 6.4 oz)   BMI 26.47 kg/m    Body mass index is 26.47 kg/m .    Exam:  Constitutional:  Appearance: Well nourished, well developed alert, in no acute distress  Skin: General Inspection:  No rashes present, no lesions present, no areas of discoloration.  Neurologic:  Mental Status:  Oriented X3.  Normal strength and tone, sensory exam grossly normal, mentation intact and speech normal.    Psychiatric:  Mentation appears normal and affect normal/bright.         ASSESSMENT/PLAN:                                                        ICD-10-CM    1. Postoperative state  Z98.890         No more bleeding therefore a pelvic exam was not repeated at this time. We discussed the final pathology. I recommend observation at this time. If her bleeding returns I would recommend another " operative hysteroscopy in the event there may be more placental site nodules present.    Rayna Noel MD  [facility]

## 2022-10-17 ENCOUNTER — OFFICE VISIT (OUTPATIENT)
Dept: OBGYN | Facility: CLINIC | Age: 27
End: 2022-10-17
Payer: COMMERCIAL

## 2022-10-17 VITALS
DIASTOLIC BLOOD PRESSURE: 64 MMHG | WEIGHT: 149 LBS | SYSTOLIC BLOOD PRESSURE: 122 MMHG | BODY MASS INDEX: 26.4 KG/M2 | HEIGHT: 63 IN

## 2022-10-17 DIAGNOSIS — N92.1 BREAKTHROUGH BLEEDING ON DEPO PROVERA: Primary | ICD-10-CM

## 2022-10-17 PROCEDURE — 99213 OFFICE O/P EST LOW 20 MIN: CPT | Performed by: OBSTETRICS & GYNECOLOGY

## 2022-10-17 NOTE — PROGRESS NOTES
Epididymitis  Inflammation of the epididymis can cause pain and swelling in your scrotum. The epididymis is a small tube next to the testicle that stores sperm. Epididymitis is usually caused by an infection. In sexually active men, it is often caused by a sexually transmitted disease (STD) such as chlamydia or gonorrhea. In boys and in men over 40, it can be from bacteria from other parts of the urinary tract (not an STD infection).  Symptoms may begin with pain in the lower belly (abdomen) or low back. The pain then spreads down into the scrotum. Usually only one side is affected. The testicle and scrotum swell and become very painful. You may have fever and a burning when passing urine. Sometimes you may have a discharge from the penis.  Treatment is with antibiotics, and anti-inflammatory and pain medicines. The condition should get better over the first few days of treatment. But it will take several weeks for all the swelling and discomfort to go away. If your healthcare provider suspects that an STD is the cause, your sexual partners must also be treated.  Home care  The following will help you care for yourself at home:  · Support the scrotum. When lying down, place a rolled towel under the scrotum. When walking, use an athletic supporter or 2 pairs of jockey-style underwear.  · To relieve pain, put ice packs on the inflamed area. You can make your own ice pack by putting ice cubes in a sealed plastic bag wrapped in a towel.  · You may use acetaminophen or ibuprofen to control pain, unless another medicine was prescribed. If you have chronic liver or kidney disease, talk with your healthcare provider before taking these medicines. Also talk with your provider if you've ever had a stomach ulcer or GI bleeding.  · Rest in bed for the first few days until the fever, pain, and swelling get better. It may take several weeks for all of the swelling to go away.  · Constipation can make you strain. This makes the    SUBJECTIVE:                                                   Hansa Ortiz is a 27 year old female who presents to clinic today for the following health issue(s):  Patient presents with:  Abnormal Uterine Bleeding: S/p hysteroscopy  for thickened endometrial tissue. C/o heavy bleeding and light cramps. Bleeding is bright red and started yesterday morning, changing pad every 2 hours.    HPI:  Hansa presents urgently today due to the resumption of bleeding per vagina. She states that it half saturates the pantiliner and is always on the tissue. She had a dilation and curettage and had placental site changes to her myometrium. She received Depo provera and initially stopped bleeding but it has since resumed. Her mother had a DVT on Depo in the past. She is not sure if her mother has a diagnosis of a thrombophilia.    No LMP recorded (lmp unknown).    Patient is sexually active, .  Using depo or other injectable for contraception.    reports that she has never smoked. She has never used smokeless tobacco.    STD testing offered?  Declined    Health maintenance updated:  yes    Today's PHQ-2 Score:   PHQ-2 (  [employer]) 10/11/2022   Q1: Little interest or pleasure in doing things 1   Q2: Feeling down, depressed or hopeless 1   PHQ-2 Score 2     Today's PHQ-9 Score:   PHQ-9 SCORE 10/11/2022   PHQ-9 Total Score 2     Today's KIRTI-7 Score:   KIRTI-7 SCORE 10/11/2022   Total Score 0       Problem list and histories reviewed & adjusted, as indicated.  Additional history: as documented.    Patient Active Problem List   Diagnosis     OSEI III (cervical intraepithelial neoplasia III)     Anxiety and depression     Family history of deep venous thrombosis     H/O LEEP     History of marijuana use     Maternal mental disorder, antepartum     Poor social situation     Situational stress     Past Surgical History:   Procedure Laterality Date     LEEP [place], CERVICAL  2019     OPERATIVE HYSTEROSCOPY WITH MORCELLATOR N/A  "9/7/2022    Procedure: OPERATIVE HYSTEROSCOPY WITH MORCELLATOR (MYOSURE);  Surgeon: Rayna Noel MD;  Location:  OR      Social History     Tobacco Use     Smoking status: Never     Smokeless tobacco: Never   Substance Use Topics     Alcohol use: Yes     Comment: wine 1-2x/ week      Problem (# of Occurrences) Relation (Name,Age of Onset)    Clotting Disorder (1) Mother            Current Outpatient Medications   Medication Sig     acetaminophen (TYLENOL) 325 MG tablet Take 2 tablets (650 mg) by mouth every 4 hours as needed for mild pain     cyclobenzaprine (FLEXERIL) 10 MG tablet Take 10 mg by mouth     ibuprofen (ADVIL/MOTRIN) 600 MG tablet Take 1 tablet (600 mg) by mouth every 6 hours as needed for moderate pain     ondansetron (ZOFRAN ODT) 4 MG ODT tab Take 1 tablet (4 mg) by mouth every 8 hours as needed for nausea (Patient not taking: No sig reported)     Current Facility-Administered Medications   Medication     medroxyPROGESTERone (DEPO-PROVERA) injection 150 mg     No Known Allergies    ROS:  12 point review of systems negative other than symptoms noted below or in the HPI.  Genitourinary: Cramps and Heavy Bleeding with Period  No urinary frequency or dysuria, bladder or kidney problems      OBJECTIVE:     /64   Ht 1.6 m (5' 3\")   Wt 67.6 kg (149 lb)   LMP  (LMP Unknown)   BMI 26.39 kg/m    Body mass index is 26.39 kg/m .    Exam:  Constitutional:  Appearance: Well nourished, well developed alert, in no acute distress  Skin: General Inspection:  No rashes present, no lesions present, no areas of discoloration.  Neurologic:  Mental Status:  Oriented X3.  Normal strength and tone, sensory exam grossly normal, mentation intact and speech normal.    Psychiatric:  Mentation appears normal and affect normal/bright.  Cervix: no lesions, no discharge and no active bleeding  Ovaries: no masses appreciated  Uterus: anteverted, smooth contour, without enlargement, mobile and without " pain worse. Avoid constipation by eating natural laxatives such as prunes, fresh fruits, and whole-grain cereals. If necessary, use a mild over-the-counter laxative for constipation. Mineral oil can be used to keep the stools soft.  · Do not have sex until you have finished all treatment and all symptoms have cleared.  · Take all medicine as directed. Do not miss any doses and do not stop early even if you feel better.  Follow-up care  Follow up with your doctor, a urologist, or as advised by our staff to be sure you are responding properly to treatment. If a culture was taken, you may call for the result as directed. A culture test can ensure that you are on the correct antibiotic.   When to seek medical advice  Call your healthcare provider right away if any of these occur:  · Fever of more than 100.4ºF (38.0ºC) after 3 days of treatment  · Increasing pain or swelling of the testicle after starting treatment  · Pressure or pain in your bladder that gets worse  · Unable to pass urine for 8 hours  © 7578-2130 [employer]. [address] [place] [address]. All rights reserved. This information is not intended as a substitute for professional medical care. Always follow your healthcare professional's instructions.          Bladder Infection (Cystitis), Male (Child)  A bladder infection is when bacteria cause the bladder to be inflamed. The bladder holds urine. A tube called the urethra takes urine from the bladder out of the body. Sometimes bacteria can travel up the urethra. This causes the infection.    The most common cause of bladder infections in children is bacteria from the bowels. The bacteria can get onto the skin around the urethra, and then into the urine. From there it can travel up to the bladder. This can happen because of:  · Poor cleaning after using the toilet or during a diaper change  · Poor cleaning of the foreskin  · Not completely emptying the bladder  · Constipation that  prevents the bladder from emptying completely  · Not drinking enough fluids to urinate often  · Irritation of the urethra from soaps or tight clothes  Symptoms of a bladder infection include the need to urinate often and urgently. It may be painful. The urine may have a strong smell. It may be dark, tinted with blood, or cloudy. Your child may not be able to hold urine and may wet the bed or his clothes. Your child may also have a fever and pain in the belly. Some children don’t have symptoms. A baby may be fussy and not able to be soothed. He may cry when urinating. Your baby may also feed less or be less active.  A bladder infection is treated with antibiotics. The health care provider may also prescribe a medicine to treat pain. Children get better from a bladder infection quickly.  In many cases a bladder infection will come back. It’s important to take steps to prevent it (see below).  Home care  The health care provider will prescribe medicine to treat the infection. Follow all instructions for giving this medicine to your child. Use the medicine as instructed every day until it is gone. Don’t stop giving it to your child if he feels better. Don’t give your child aspirin unless you are told to by the health care provider.  For children ages 2 and up: You can give acetaminophen or ibuprofen for pain, fever, fussiness, or discomfort, if allowed by the health care provider. If your child has chronic liver or kidney disease, talk with your health care provider before giving these medicines . Also talk with your provider if your child has ever had a stomach ulcer or GI bleeding, or is taking blood thinners.  General care:  · Keep track of how often your child urinates. Note the urine color and amount.  · Tell your child to urinate often. Tell him to completely empty the bladder each time. This will help flush out bacteria.  · Have your child wear loose clothes and cotton underwear.  · Make sure that your child  tenderness  Rectal:  deferred       ASSESSMENT/PLAN:                                                        ICD-10-CM    1. Breakthrough bleeding on depo provera  N92.1 US Transvaginal Pelvic Non-OB          Her last ultrasound showed focal calcifications and her hysteroscopy confirmed this. Her current bleeding is likely related to Depo alone. Given her pathology however I recommend repeating her ultrasound. If it is negative then I would recommend changing her contraception. She is considering an IUD.    Rayna Noel MD  [facility] [place]   drinks enough fluids. Give your child cranberry juice if advised by the health care provider.  Prevention:  · Clean your child’s penis every day. If he is uncircumcised, retract the foreskin when cleaning.  · Make sure diapers aren’t tight. If you use cloth diapers, use cotton or wool protectors rather than nylon or rubber pants.   · Change soiled diapers right away.  · Make sure your child drinks plenty of fluids. Or, make sure your baby feeds often. This is to prevent dehydration.  · Make sure your child urinates when needed, and does not hold it in.  · Don’t give your child bubble baths. They can irritate the urethra.  Follow-up care  Follow up with your child’s health care provider. If a culture was done, you will be told if your child's treatment needs to be changed. If directed, you can call to find out the results.  When to call 911  Call 911 if any of these occur:  · Trouble breathing  · Difficulty arousing  · Fainting or loss of consciousness  · Rapid heart rate  · Seizure  When to seek medical advice  Call your child's health care provider right away if any of these occur:  · Fever of 100.4°F (38°C) or higher that doesn’t get better with medicine  · Symptoms don’t get better after 24 hours of treatment  · Vomiting or inability to keep down medicine  · Pain gets worse  · Pain in the low back, belly, or side  · Foul-smelling urine  · Yellow tint to the skin or eyes (jaundice)  · Symptoms don’t go away after 3 days of treatment  © 7865-3050 [employer]. [address] [place] [address], [place] [address]. All rights reserved. This information is not intended as a substitute for professional medical care. Always follow your healthcare professional's instructions.

## 2022-11-07 ENCOUNTER — VIRTUAL VISIT (OUTPATIENT)
Dept: OBGYN | Facility: CLINIC | Age: 27
End: 2022-11-07
Attending: NURSE PRACTITIONER
Payer: COMMERCIAL

## 2022-11-07 ENCOUNTER — ANCILLARY PROCEDURE (OUTPATIENT)
Dept: ULTRASOUND IMAGING | Facility: CLINIC | Age: 27
End: 2022-11-07
Attending: NURSE PRACTITIONER
Payer: COMMERCIAL

## 2022-11-07 DIAGNOSIS — Z91.199 NO-SHOW FOR APPOINTMENT: Primary | ICD-10-CM

## 2022-11-07 DIAGNOSIS — N93.8 DUB (DYSFUNCTIONAL UTERINE BLEEDING): ICD-10-CM

## 2022-11-07 PROCEDURE — 76830 TRANSVAGINAL US NON-OB: CPT | Performed by: OBSTETRICS & GYNECOLOGY

## 2022-12-07 NOTE — PROGRESS NOTES
Hansa Ortiz is a 27 year old female who is being evaluated via a billable telephone visit.      What phone number would you like to be contacted at? [phone]  How would you like to obtain your AVS? MyChart  Patient does not have MyChart.      Originating Location (pt. Location): home      Distant Location (provider location):  On-site      SUBJECTIVE:                                                   Hansa Ortiz is a 27 year old female who presents for virtual visit today for the following health issue(s):  Patient presents with:  Follow Up: Us follow up      HPI:  Hansa presents for a telephone visit for discussion of management of contraception. She got a second opinion and was placed on the OCPs and took it and did well in terms of breakthrough bleeding. She is nervous about it as contraception as she is not consistent with taking the OCP.     Patient's last menstrual period was 2022 (exact date).. Irregular bleeding on Depo.  Patient is sexually active, .  Using depo injectable for contraception.    reports that she has never smoked. She has never used smokeless tobacco.      Health maintenance updated:  Immunizations reviewed, COVID series never done      Today's PHQ-2 Score:   PHQ-2 (  [employer]) 10/11/2022   Q1: Little interest or pleasure in doing things 1   Q2: Feeling down, depressed or hopeless 1   PHQ-2 Score 2     Today's PHQ-9 Score:   PHQ-9 SCORE 10/11/2022   PHQ-9 Total Score 2     Today's KIRTI-7 Score:   KIRTI-7 SCORE 10/11/2022   Total Score 0       Problem list and histories reviewed & adjusted, as indicated.  Additional history: as documented.    Patient Active Problem List   Diagnosis     OSEI III (cervical intraepithelial neoplasia III)     Anxiety and depression     Family history of deep venous thrombosis     H/O LEEP     History of marijuana use     Maternal mental disorder, antepartum     Poor social situation     Situational stress     Past Surgical History:   Procedure  Laterality Date     LEEP TX, CERVICAL  01/01/2019     OPERATIVE HYSTEROSCOPY WITH MORCELLATOR N/A 9/7/2022    Procedure: OPERATIVE HYSTEROSCOPY WITH MORCELLATOR (MYOSURE);  Surgeon: Rayna Noel MD;  Location:  OR      Social History     Tobacco Use     Smoking status: Never     Smokeless tobacco: Never   Substance Use Topics     Alcohol use: Yes     Comment: wine 1-2x/ week      Problem (# of Occurrences) Relation (Name,Age of Onset)    Clotting Disorder (1) Mother            Current Outpatient Medications   Medication Sig     acetaminophen (TYLENOL) 325 MG tablet Take 2 tablets (650 mg) by mouth every 4 hours as needed for mild pain     cyclobenzaprine (FLEXERIL) 10 MG tablet Take 10 mg by mouth     etonogestrel-ethinyl estradiol (NUVARING) 0.12-0.015 MG/24HR vaginal ring Place 1 each vaginally every 28 days     ibuprofen (ADVIL/MOTRIN) 600 MG tablet Take 1 tablet (600 mg) by mouth every 6 hours as needed for moderate pain     ESTARYLLA 0.25-35 MG-MCG tablet Take 1 tablet by mouth daily     ondansetron (ZOFRAN ODT) 4 MG ODT tab Take 1 tablet (4 mg) by mouth every 8 hours as needed for nausea (Patient not taking: Reported on 10/4/2022)     Current Facility-Administered Medications   Medication     medroxyPROGESTERone (DEPO-PROVERA) injection 150 mg     No Known Allergies      OBJECTIVE:               ASSESSMENT/PLAN:                                                      Phone call duration: 5 minutes      ICD-10-CM    1. Encounter for initial prescription of vaginal ring hormonal contraceptive  Z30.015 etonogestrel-ethinyl estradiol (NUVARING) 0.12-0.015 MG/24HR vaginal ring        We discussed trying to Nuva Ring to get the benefit of a combined hormonal contraceptive but to allow for more consistent use. Method of use was discussed. Emphasized only 21 days of active medication.     Rayna Noel MD  [facility]

## 2022-12-12 ENCOUNTER — VIRTUAL VISIT (OUTPATIENT)
Dept: OBGYN | Facility: CLINIC | Age: 27
End: 2022-12-12
Payer: COMMERCIAL

## 2022-12-12 DIAGNOSIS — Z30.015 ENCOUNTER FOR INITIAL PRESCRIPTION OF VAGINAL RING HORMONAL CONTRACEPTIVE: Primary | ICD-10-CM

## 2022-12-12 PROCEDURE — 99213 OFFICE O/P EST LOW 20 MIN: CPT | Mod: 93 | Performed by: OBSTETRICS & GYNECOLOGY

## 2022-12-12 RX ORDER — ETONOGESTREL AND ETHINYL ESTRADIOL VAGINAL RING .015; .12 MG/D; MG/D
1 RING VAGINAL
Qty: 3 EACH | Refills: 3 | Status: SHIPPED | OUTPATIENT
Start: 2022-12-12 | End: 2023-05-04

## 2022-12-12 RX ORDER — NORGESTIMATE AND ETHINYL ESTRADIOL 0.25-0.035
1 KIT ORAL DAILY
COMMUNITY
Start: 2022-10-31 | End: 2023-03-17

## 2023-03-10 ENCOUNTER — VIRTUAL VISIT (OUTPATIENT)
Dept: URGENT CARE | Facility: CLINIC | Age: 28
End: 2023-03-10
Payer: COMMERCIAL

## 2023-03-10 ENCOUNTER — LAB (OUTPATIENT)
Dept: LAB | Facility: CLINIC | Age: 28
End: 2023-03-10
Payer: COMMERCIAL

## 2023-03-10 DIAGNOSIS — J02.9 SORE THROAT: ICD-10-CM

## 2023-03-10 DIAGNOSIS — J02.9 SORE THROAT: Primary | ICD-10-CM

## 2023-03-10 DIAGNOSIS — J02.0 STREP THROAT: Primary | ICD-10-CM

## 2023-03-10 LAB — DEPRECATED S PYO AG THROAT QL EIA: POSITIVE

## 2023-03-10 PROCEDURE — 87880 STREP A ASSAY W/OPTIC: CPT

## 2023-03-10 PROCEDURE — U0003 INFECTIOUS AGENT DETECTION BY NUCLEIC ACID (DNA OR RNA); SEVERE ACUTE RESPIRATORY SYNDROME CORONAVIRUS 2 (SARS-COV-2) (CORONAVIRUS DISEASE [COVID-19]), AMPLIFIED PROBE TECHNIQUE, MAKING USE OF HIGH THROUGHPUT TECHNOLOGIES AS DESCRIBED BY CMS-2020-01-R: HCPCS

## 2023-03-10 PROCEDURE — 99203 OFFICE O/P NEW LOW 30 MIN: CPT | Mod: VID

## 2023-03-10 PROCEDURE — U0005 INFEC AGEN DETEC AMPLI PROBE: HCPCS

## 2023-03-10 RX ORDER — AMOXICILLIN 875 MG
875 TABLET ORAL 2 TIMES DAILY
Qty: 20 TABLET | Refills: 0 | Status: SHIPPED | OUTPATIENT
Start: 2023-03-10 | End: 2023-03-20

## 2023-03-10 NOTE — PROGRESS NOTES
"  Hansa Ortiz is a 27 year old female who is being evaluated via a billable video visit.      The patient has been notified of following at the time of scheduling video visit:     \"This video visit will be conducted via a video call between you and your physician/provider. We have found that certain health care needs can be provided without the need for a physical exam.  This service lets us provide the care you need with a video conversation.  If a prescription is necessary we can send it directly to your pharmacy.  If lab work is needed we can place an order for that and you can then stop by our lab to have the test done at a later time.\"   Patient has given consent for video visit?  YES    SUBJECTIVE:  Hansa Ortiz is an 27 year old female who presents for sore throat.  sxs started today while at work.  Son was dx with strep yesterday.  Feels a little tired.  No cough or runny nose.  No n/v/d.  Some pain from throat radiating toward right ear.  Left work a little early today because of sxs.      PMH:   has a past medical history of Abnormal uterine bleeding, Back pain, OSEI III (cervical intraepithelial neoplasia III) (01/03/2019), abnormal cervical Pap smear, and Trichomonas infection (01/2020).  Patient Active Problem List   Diagnosis     OSEI III (cervical intraepithelial neoplasia III)     Anxiety and depression     Family history of deep venous thrombosis     H/O LEEP     History of marijuana use     Maternal mental disorder, antepartum     Poor social situation     Situational stress     Social History     Socioeconomic History     Marital status: Single   Tobacco Use     Smoking status: Never     Smokeless tobacco: Never   Vaping Use     Vaping Use: Some days     Substances: Nicotine, CBD     Devices: Disposable, Pre-filled or refillable cartridge   Substance and Sexual Activity     Alcohol use: Yes     Comment: wine 1-2x/ week     Drug use: Not Currently     Types: Marijuana     Comment: states last " used 3 weeks ago (approx. 9/15)     Sexual activity: Yes     Partners: Male     Birth control/protection: Injection     Comment: Depo     Family History   Problem Relation Age of Onset     Clotting Disorder Mother        ALLERGIES:  Patient has no known allergies.    Current Outpatient Medications   Medication     acetaminophen (TYLENOL) 325 MG tablet     cyclobenzaprine (FLEXERIL) 10 MG tablet     ESTARYLLA 0.25-35 MG-MCG tablet     etonogestrel-ethinyl estradiol (NUVARING) 0.12-0.015 MG/24HR vaginal ring     ibuprofen (ADVIL/MOTRIN) 600 MG tablet     ondansetron (ZOFRAN ODT) 4 MG ODT tab     Current Facility-Administered Medications   Medication     medroxyPROGESTERone (DEPO-PROVERA) injection 150 mg         ROS:  ROS is done and is negative for general/constitutional, eye, ENT, Respiratory, cardiovascular, GI, , Skin, musculoskeletal except as noted elsewhere.  All other review of systems negative except as noted elsewhere.      OBJECTIVE:    No vital signs obtained as is virtual visit    GENERAL: Healthy, alert and no distress  EYES: Eyes grossly normal to inspection.  No discharge or erythema, or obvious scleral/conjunctival abnormalities.  RESP: No audible wheeze, cough, or visible cyanosis.  No visible retractions or increased work of breathing.    SKIN: Visible skin clear. No significant rash, abnormal pigmentation or lesions.  NEURO: Cranial nerves grossly intact.  Mentation and speech appropriate for age.  PSYCH: Mentation appears normal, affect normal/bright, judgement and insight intact, normal speech and appearance well-groomed.         ASSESSMENT/PLAN:    ASSESSMENT / PLAN:  (J02.9) Sore throat  (primary encounter diagnosis)  Comment: possible strep given known close exposure.  Also consider covid or other viral etiology.  Will test for strep  Plan: Streptococcus A Rapid Screen w/Reflex to PCR -         Clinic Collect, Symptomatic COVID-19 Virus         (Coronavirus) by PCR        Await strep result  and if positive would start abx.  If negative, await covid test and pcr. I reviewed supportive care, otc meds to use if needed, expected course, and signs of concern.  Follow up as needed or if she does not improve within 5 day(s) or if worsens in any way.  Reviewed red flag symptoms and is to go to the ER if experiences any of these.          See [facility] for orders, medications, letters, patient instructions    Candie Daniels MD  3/10/2023, 3:44 PM    Video-Visit Details    Video Start Time: 5:20    Type of service:  Video Visit    Video End Time:5:28 PM    Originating Location (pt. Location): Home    Distant Location (provider location):   [facility]     Platform used for Video Visit: Chomp

## 2023-03-12 LAB — SARS-COV-2 RNA RESP QL NAA+PROBE: NEGATIVE

## 2023-03-17 ENCOUNTER — VIRTUAL VISIT (OUTPATIENT)
Dept: URGENT CARE | Facility: CLINIC | Age: 28
End: 2023-03-17
Payer: COMMERCIAL

## 2023-03-17 DIAGNOSIS — J02.0 ACUTE STREPTOCOCCAL PHARYNGITIS: Primary | ICD-10-CM

## 2023-03-17 PROCEDURE — 99213 OFFICE O/P EST LOW 20 MIN: CPT | Mod: VID

## 2023-03-17 RX ORDER — PENICILLIN V POTASSIUM 500 MG/1
500 TABLET, FILM COATED ORAL 2 TIMES DAILY
Qty: 20 TABLET | Refills: 0 | Status: SHIPPED | OUTPATIENT
Start: 2023-03-17 | End: 2023-03-27

## 2023-03-17 NOTE — PATIENT INSTRUCTIONS
"Antibiotics as directed- penicillin twice daily for 10 days. Take pills until they are gone, even once you start feeling better.  Drink plenty of fluids and rest.  May use salt water gargles- about 8 oz warm water with about 1 teaspoon salt  Sucrets and Cepacol spray are over the counter medications that numb the throat.  Over the counter pain relievers such as tylenol or ibuprofen may be used as needed.   Honey lemon tea helps to soothe the throat. \"Throat Coat\" tea is soothing as well.  Change toothbrush after 24 hours of antibiotics (may soak in 3-6% hydrogen peroxide)  Will be contagious for 24 hours after starting antibiotic  May return to school//work/activities 24 hours after antibiotics are started.  Wash hands frequently and do not share beverages.  Please follow up with primary care provider if symptoms are not improving, worsening or new symptoms or for any adverse reactions to medications.     "

## 2023-03-17 NOTE — LETTER
[facility]  [address] [address]  Phone: [phone]    March 17, 2023        Hansa Ortiz  [address]  [facility] [address]      To whom it may concern:    RE: Hansa Santo was seen and treated today by myself.  She was diagnosed with strep throat.  She will need to stay home for at least 24 hours after starting her antibiotic.  Please excuse her from work missed 3/17/2023.    Please contact me for questions or concerns.      Sincerely,    Carrol Curtis PA-C  [facility]

## 2023-03-17 NOTE — PROGRESS NOTES
Assessment & Plan     Acute streptococcal pharyngitis  - penicillin V (VEETID) 500 MG tablet; Take 1 tablet (500 mg) by mouth 2 times daily for 10 days    We discussed the importance of finishing the entire course of antibiotic.  Prescription for penicillin which is a more normal spectrum antibiotic but shown to be effective at treating streptococcal pharyngitis was sent to her pharmacy.  She will take this twice daily for 10 days.  Stay home for 24 hours after starting antibiotic if she may be contagious during this time.  Note for work given.    Return in about 1 week (around 3/24/2023) for visit with primary care provider if not improving.     Marie Curtis PA-C  [facility]    Subjective   Hansa Ortiz is a 27 year old who presents for the following health issues    HPI    Hansa presents via video for symptoms related to strep throat.  8 days ago, she had a virtual visit and went in for a strep test that was positive.  She was prescribed amoxicillin twice daily for 10 days.  She took it for about 4 days but was been feeling better so stopped taking prescription.  Couple days ago, her symptoms returned.    Review of Systems   ROS negative except as stated above.      Objective    Physical Exam   GENERAL: Healthy, alert and no distress  EYES: Eyes grossly normal to inspection.  No discharge or erythema, or obvious scleral/conjunctival abnormalities.  HENT: Normal cephalic/atraumatic.  External ears, nose and mouth without ulcers or lesions.  No nasal drainage visible.  RESP: No audible cough wheeze or visible cyanosis.  No visible retractions or increased work of breathing.    SKIN: Visible skin clear. No significant rash, abnormal pigmentation or lesions.  NEURO: Cranial nerves grossly intact.  Mentation and speech appropriate for age.  PSYCH: Mentation appears normal, affect normal/bright, judgement and insight intact, normal speech and appearance well-groomed.    Type of service:   Video Visit  Video Start Time: 11:09AM  Video End Time: 11:16AM  Originating Location (pt. Location): Home  Distant Location (provider location):  [facility]- offsite at home, [place]  Platform used for Video Visit: Renny

## 2023-05-03 NOTE — PROGRESS NOTES
SUBJECTIVE:                                                   Hansa Ortiz is a 27 year old female who presents to clinic today for the following health issue(s):  Patient presents with:  Follow Up      Additional information: concerns with using correctly 04/15-     HPI:  Hansa presents for follow up. She has been on the Nuva Ring. Initially she was giving herself monthly bleeds but then she changed it to continuous use and started to have spotting. She removed it and allowed a full week of bleeding. It was proposed to stop the breakthrough bleeding that she had on Depo Provera. She was concerned about not using it properly.   She would like testing to show negative TB and that she is immune to Varicella.       Patient's last menstrual period was 04/15/2023..     Patient is sexually active, .  Using Vaginal Ring  for contraception.    reports that she has never smoked. She has never used smokeless tobacco.    STD testing offered?  Accepted    Health maintenance updated: titers     Today's PHQ-2 Score:       2023     2:53 PM   PHQ-2 (  [employer])   Q1: Little interest or pleasure in doing things 0   Q2: Feeling down, depressed or hopeless 0   PHQ-2 Score 0     Today's PHQ-9 Score:       10/11/2022    10:45 AM   PHQ-9 SCORE   PHQ-9 Total Score 2     Today's KIRTI-7 Score:       10/11/2022    10:45 AM   KIRTI-7 SCORE   Total Score 0       Problem list and histories reviewed & adjusted, as indicated.  Additional history: as documented.    Patient Active Problem List   Diagnosis     OSEI III (cervical intraepithelial neoplasia III)     Anxiety and depression     Family history of deep venous thrombosis     H/O LEEP     History of marijuana use     Maternal mental disorder, antepartum     Poor social situation     Situational stress     Past Surgical History:   Procedure Laterality Date     LEEP TX, CERVICAL  2019     OPERATIVE HYSTEROSCOPY WITH MORCELLATOR N/A 2022    Procedure: OPERATIVE  "HYSTEROSCOPY WITH MORCELLATOR (MYOSURE);  Surgeon: Rayna Noel MD;  Location:  OR      Social History     Tobacco Use     Smoking status: Never     Smokeless tobacco: Never   Vaping Use     Vaping status: Some Days     Substances: Nicotine, CBD     Devices: Disposable, Pre-filled or refillable cartridge     Passive vaping exposure: Yes   Substance Use Topics     Alcohol use: Yes     Comment: wine 1-2x/ week      Problem (# of Occurrences) Relation (Name,Age of Onset)    Clotting Disorder (1) Mother            Current Outpatient Medications   Medication Sig     acetaminophen (TYLENOL) 325 MG tablet Take 2 tablets (650 mg) by mouth every 4 hours as needed for mild pain     cyclobenzaprine (FLEXERIL) 10 MG tablet Take 10 mg by mouth     etonogestrel-ethinyl estradiol (NUVARING) 0.12-0.015 MG/24HR vaginal ring Place 1 each vaginally every 28 days     ibuprofen (ADVIL/MOTRIN) 600 MG tablet Take 1 tablet (600 mg) by mouth every 6 hours as needed for moderate pain     Current Facility-Administered Medications   Medication     medroxyPROGESTERone (DEPO-PROVERA) injection 150 mg     No Known Allergies    ROS:  12 point review of systems negative other than symptoms noted below or in the HPI.  No urinary frequency or dysuria, bladder or kidney problems      OBJECTIVE:     /58   Ht 1.6 m (5' 3\")   Wt 68.6 kg (151 lb 3.2 oz)   LMP 04/15/2023   Breastfeeding No   BMI 26.78 kg/m    Body mass index is 26.78 kg/m .    Exam:  Constitutional:  Appearance: Well nourished, well developed alert, in no acute distress  Skin: General Inspection:  No rashes present, no lesions present, no areas of discoloration.  Neurologic:  Mental Status:  Oriented X3.  Normal strength and tone, sensory exam grossly normal, mentation intact and speech normal.    Psychiatric:  Mentation appears normal and affect normal/bright.  Pelvic Exam:  External Genitalia:     Normal appearance for age, no discharge present, no tenderness " present, no inflammatory lesions present, color normal  Vagina:     Normal vaginal vault without central or paravaginal defects, no discharge present, no inflammatory lesions present, no masses present  Bladder:     Nontender to palpation  Urethra:   Urethral Body:  Urethra palpation normal, urethra structural support normal   Urethral Meatus:  No erythema or lesions present  Cervix:     Appearance healthy, no lesions present, nontender to palpation, no bleeding present  Uterus:     Uterus: firm, normal sized and nontender, anteverted in position.   Adnexa:     No adnexal tenderness present, no adnexal masses present  Perineum:     Perineum within normal limits, no evidence of trauma, no rashes or skin lesions present  Anus:     Anus within normal limits, no hemorrhoids present  Inguinal Lymph Nodes:     No lymphadenopathy present  Pubic Hair:     Normal pubic hair distribution for age  Genitalia and Groin:     No rashes present, no lesions present, no areas of discoloration, no masses present       In-Clinic Test Results:  Results for orders placed or performed in visit on 05/04/23 (from the past 24 hour(s))   HCG Qual, Urine ([id number])   Result Value Ref Range    hCG Urine Qualitative Negative Negative   Quantiferon TB Gold Plus    Specimen: Peripheral Blood    Narrative    The following orders were created for panel order Quantiferon TB Gold Plus.  Procedure                               Abnormality         Status                     ---------                               -----------         ------                     Quantiferon TB Gold Plus...[[id number]]                      In process                 Quantiferon TB Gold Plus...[[id number]]                      In process                 Quantiferon TB Gold Plus...[[id number]]                      In process                 Quantiferon TB Gold Plus...[[id number]]                      In process                   Please view results for these tests on the  individual orders.       ASSESSMENT/PLAN:                                                        ICD-10-CM    1. Pregnancy examination or test, pregnancy unconfirmed  Z32.00 HCG Qual, Urine ([id number])     HCG Qual, Urine ([id number])      2. Screening examination for STD (sexually transmitted disease)  Z11.3 NEISSERIA GONORRHOEA PCR     Trichomonas vaginalis by PCR     Hepatitis B surface antigen     Hepatitis B surface antigen      3. Screening for HIV (human immunodeficiency virus)  Z11.4 HIV Antigen Antibody Combo      4. Screening for chlamydial disease  Z11.8 CHLAMYDIA TRACHOMATIS PCR      5. Encounter for hepatitis C screening test for low risk patient  Z11.59 Hepatitis C antibody     Hepatitis C antibody      6. Screening for cervical cancer  Z12.4 Pap diagnostic with HPV      7. Immunity to varicella determined by serologic test  Z01.84 Varicella Zoster Virus Antibody IgG     Varicella Zoster Virus Antibody IgG      8. Screening examination for pulmonary tuberculosis  Z11.1 Quantiferon TB Gold Plus     Quantiferon TB Gold Plus      9. Encounter for initial prescription of vaginal ring hormonal contraceptive  Z30.015 etonogestrel-ethinyl estradiol (NUVARING) 0.12-0.015 MG/24HR vaginal ring        Pap smear done today. Cultures done as per request.  Recommend using the Nuva Ring as prescribed to avoid breakthrough bleeding.   Varicella IgG and Quantiferon ordered today.    Rayna Noel MD  [facility] [place]

## 2023-05-04 ENCOUNTER — OFFICE VISIT (OUTPATIENT)
Dept: OBGYN | Facility: CLINIC | Age: 28
End: 2023-05-04
Payer: COMMERCIAL

## 2023-05-04 VITALS
BODY MASS INDEX: 26.79 KG/M2 | HEIGHT: 63 IN | SYSTOLIC BLOOD PRESSURE: 110 MMHG | WEIGHT: 151.2 LBS | DIASTOLIC BLOOD PRESSURE: 58 MMHG

## 2023-05-04 DIAGNOSIS — Z01.84 IMMUNITY TO VARICELLA DETERMINED BY SEROLOGIC TEST: ICD-10-CM

## 2023-05-04 DIAGNOSIS — Z11.3 SCREENING EXAMINATION FOR STD (SEXUALLY TRANSMITTED DISEASE): ICD-10-CM

## 2023-05-04 DIAGNOSIS — Z11.59 ENCOUNTER FOR HEPATITIS C SCREENING TEST FOR LOW RISK PATIENT: ICD-10-CM

## 2023-05-04 DIAGNOSIS — Z12.4 SCREENING FOR CERVICAL CANCER: ICD-10-CM

## 2023-05-04 DIAGNOSIS — Z32.00 PREGNANCY EXAMINATION OR TEST, PREGNANCY UNCONFIRMED: Primary | ICD-10-CM

## 2023-05-04 DIAGNOSIS — Z11.1 SCREENING EXAMINATION FOR PULMONARY TUBERCULOSIS: ICD-10-CM

## 2023-05-04 DIAGNOSIS — Z11.4 SCREENING FOR HIV (HUMAN IMMUNODEFICIENCY VIRUS): ICD-10-CM

## 2023-05-04 DIAGNOSIS — Z30.015 ENCOUNTER FOR INITIAL PRESCRIPTION OF VAGINAL RING HORMONAL CONTRACEPTIVE: ICD-10-CM

## 2023-05-04 DIAGNOSIS — Z11.8 SCREENING FOR CHLAMYDIAL DISEASE: ICD-10-CM

## 2023-05-04 LAB
HCG UR QL: NEGATIVE
T VAGINALIS DNA SPEC QL NAA+PROBE: NOT DETECTED

## 2023-05-04 PROCEDURE — 87591 N.GONORRHOEAE DNA AMP PROB: CPT | Performed by: OBSTETRICS & GYNECOLOGY

## 2023-05-04 PROCEDURE — 86803 HEPATITIS C AB TEST: CPT | Performed by: OBSTETRICS & GYNECOLOGY

## 2023-05-04 PROCEDURE — 87389 HIV-1 AG W/HIV-1&-2 AB AG IA: CPT | Performed by: OBSTETRICS & GYNECOLOGY

## 2023-05-04 PROCEDURE — 36415 COLL VENOUS BLD VENIPUNCTURE: CPT | Performed by: OBSTETRICS & GYNECOLOGY

## 2023-05-04 PROCEDURE — 87491 CHLMYD TRACH DNA AMP PROBE: CPT | Performed by: OBSTETRICS & GYNECOLOGY

## 2023-05-04 PROCEDURE — 87661 TRICHOMONAS VAGINALIS AMPLIF: CPT | Performed by: OBSTETRICS & GYNECOLOGY

## 2023-05-04 PROCEDURE — 87340 HEPATITIS B SURFACE AG IA: CPT | Performed by: OBSTETRICS & GYNECOLOGY

## 2023-05-04 PROCEDURE — 87624 HPV HI-RISK TYP POOLED RSLT: CPT | Performed by: OBSTETRICS & GYNECOLOGY

## 2023-05-04 PROCEDURE — 86787 VARICELLA-ZOSTER ANTIBODY: CPT | Performed by: OBSTETRICS & GYNECOLOGY

## 2023-05-04 PROCEDURE — 86481 TB AG RESPONSE T-CELL SUSP: CPT | Performed by: OBSTETRICS & GYNECOLOGY

## 2023-05-04 PROCEDURE — 88175 CYTOPATH C/V AUTO FLUID REDO: CPT | Performed by: OBSTETRICS & GYNECOLOGY

## 2023-05-04 PROCEDURE — 81025 URINE PREGNANCY TEST: CPT | Performed by: OBSTETRICS & GYNECOLOGY

## 2023-05-04 PROCEDURE — 99213 OFFICE O/P EST LOW 20 MIN: CPT | Performed by: OBSTETRICS & GYNECOLOGY

## 2023-05-04 RX ORDER — ETONOGESTREL AND ETHINYL ESTRADIOL VAGINAL RING .015; .12 MG/D; MG/D
1 RING VAGINAL
Qty: 3 EACH | Refills: 3 | Status: SHIPPED | OUTPATIENT
Start: 2023-05-04 | End: 2024-03-21

## 2023-05-05 LAB
C TRACH DNA SPEC QL NAA+PROBE: NEGATIVE
HBV SURFACE AG SERPL QL IA: NONREACTIVE
HCV AB SERPL QL IA: NONREACTIVE
HIV 1+2 AB+HIV1 P24 AG SERPL QL IA: NONREACTIVE
N GONORRHOEA DNA SPEC QL NAA+PROBE: NEGATIVE
VZV IGG SER QL IA: 233.1 INDEX
VZV IGG SER QL IA: POSITIVE

## 2023-05-08 LAB
GAMMA INTERFERON BACKGROUND BLD IA-ACNC: 0.19 IU/ML
M TB IFN-G BLD-IMP: NEGATIVE
M TB IFN-G CD4+ BCKGRND COR BLD-ACNC: 9.81 IU/ML
MITOGEN IGNF BCKGRD COR BLD-ACNC: 0.07 IU/ML
MITOGEN IGNF BCKGRD COR BLD-ACNC: 0.11 IU/ML
QUANTIFERON MITOGEN: 10 IU/ML
QUANTIFERON NIL TUBE: 0.19 IU/ML
QUANTIFERON TB1 TUBE: 0.3 IU/ML
QUANTIFERON TB2 TUBE: 0.26

## 2023-05-09 LAB
BKR LAB AP GYN ADEQUACY: NORMAL
BKR LAB AP GYN INTERPRETATION: NORMAL
BKR LAB AP HPV REFLEX: NORMAL
BKR LAB AP LMP: NORMAL
BKR LAB AP PREVIOUS ABNL DX: NORMAL
BKR LAB AP PREVIOUS ABNORMAL: NORMAL
PATH REPORT.COMMENTS IMP SPEC: NORMAL
PATH REPORT.COMMENTS IMP SPEC: NORMAL
PATH REPORT.RELEVANT HX SPEC: NORMAL

## 2023-05-15 ENCOUNTER — PATIENT OUTREACH (OUTPATIENT)
Dept: OBGYN | Facility: CLINIC | Age: 28
End: 2023-05-15
Payer: COMMERCIAL

## 2023-10-05 NOTE — DISCHARGE INSTRUCTIONS
Keep your groin wound clean and dry for the next few days, though it is ok to bathe as you normally would.  No antibiotics are necessary at this time.  Regarding your diarrhea, it would be reasonable to take Imodium or Pepto Bismol (both available over the counter).  Be sure to drink plenty of fluids to stay well hydrated.  
alert

## 2023-10-24 DIAGNOSIS — Z30.015 ENCOUNTER FOR INITIAL PRESCRIPTION OF VAGINAL RING HORMONAL CONTRACEPTIVE: ICD-10-CM

## 2023-10-24 RX ORDER — ETONOGESTREL AND ETHINYL ESTRADIOL VAGINAL RING .015; .12 MG/D; MG/D
1 RING VAGINAL
Qty: 3 EACH | Refills: 3 | OUTPATIENT
Start: 2023-10-24

## 2023-10-24 NOTE — TELEPHONE ENCOUNTER
"Requested Prescriptions   Pending Prescriptions Disp Refills    etonogestrel-ethinyl estradiol (NUVARING) 0.12-0.015 MG/24HR vaginal ring 3 each 3     Sig: Place 1 each vaginally every 28 days       Contraceptives Protocol Passed - 10/24/2023 11:29 AM        Passed - Patient is not a current smoker if age is 35 or older        Passed - Recent (12 mo) or future (30 days) visit within the authorizing provider's specialty     Patient has had an office visit with the authorizing provider or a provider within the authorizing providers department within the previous 12 mos or has a future within next 30 days. See \"Patient Info\" tab in inbasket, or \"Choose Columns\" in Meds & Orders section of the refill encounter.              Passed - Medication is active on med list        Passed - No active pregnancy on record        Passed - No positive pregnancy test in past 12 months           Last Written Prescription Date:  05/04/2023  Last Fill Quantity: 3 each,  # refills: 3   Last office visit: 5/4/2023 ; last virtual visit: 12/12/2022 with prescribing provider:  Rayna Zhang   Future Office Visit:  None          "

## 2023-11-29 PROCEDURE — 96374 THER/PROPH/DIAG INJ IV PUSH: CPT | Mod: 59

## 2023-11-29 PROCEDURE — 96375 TX/PRO/DX INJ NEW DRUG ADDON: CPT

## 2023-11-29 PROCEDURE — 99285 EMERGENCY DEPT VISIT HI MDM: CPT | Mod: 25

## 2023-11-29 PROCEDURE — 96361 HYDRATE IV INFUSION ADD-ON: CPT

## 2023-11-30 ENCOUNTER — HOSPITAL ENCOUNTER (EMERGENCY)
Facility: CLINIC | Age: 28
Discharge: HOME | End: 2023-11-30
Attending: EMERGENCY MEDICINE | Admitting: EMERGENCY MEDICINE
Payer: COMMERCIAL

## 2023-11-30 ENCOUNTER — APPOINTMENT (OUTPATIENT)
Dept: CT IMAGING | Facility: CLINIC | Age: 28
End: 2023-11-30
Attending: EMERGENCY MEDICINE
Payer: COMMERCIAL

## 2023-11-30 VITALS
SYSTOLIC BLOOD PRESSURE: 149 MMHG | RESPIRATION RATE: 18 BRPM | HEART RATE: 67 BPM | DIASTOLIC BLOOD PRESSURE: 95 MMHG | TEMPERATURE: 97.4 F | OXYGEN SATURATION: 100 %

## 2023-11-30 DIAGNOSIS — R10.9 ABDOMINAL PAIN, UNSPECIFIED ABDOMINAL LOCATION: ICD-10-CM

## 2023-11-30 DIAGNOSIS — N20.0 CALCULUS OF KIDNEY: ICD-10-CM

## 2023-11-30 LAB
ALBUMIN UR-MCNC: 10 MG/DL
ANION GAP SERPL CALCULATED.3IONS-SCNC: 14 MMOL/L (ref 7–15)
APPEARANCE UR: CLEAR
BASOPHILS # BLD AUTO: 0 10E3/UL (ref 0–0.2)
BASOPHILS NFR BLD AUTO: 0 %
BILIRUB UR QL STRIP: NEGATIVE
BUN SERPL-MCNC: 9.1 MG/DL (ref 6–20)
C TRACH DNA SPEC QL PROBE+SIG AMP: NEGATIVE
CALCIUM SERPL-MCNC: 9.5 MG/DL (ref 8.6–10)
CHLORIDE SERPL-SCNC: 102 MMOL/L (ref 98–107)
CLUE CELLS: NORMAL
COLOR UR AUTO: ABNORMAL
CREAT SERPL-MCNC: 0.71 MG/DL (ref 0.51–0.95)
DEPRECATED HCO3 PLAS-SCNC: 24 MMOL/L (ref 22–29)
EGFRCR SERPLBLD CKD-EPI 2021: >90 ML/MIN/1.73M2
EOSINOPHIL # BLD AUTO: 0.1 10E3/UL (ref 0–0.7)
EOSINOPHIL NFR BLD AUTO: 1 %
ERYTHROCYTE [DISTWIDTH] IN BLOOD BY AUTOMATED COUNT: 13.1 % (ref 10–15)
FLUAV RNA SPEC QL NAA+PROBE: NEGATIVE
FLUBV RNA RESP QL NAA+PROBE: NEGATIVE
GLUCOSE SERPL-MCNC: 115 MG/DL (ref 70–99)
GLUCOSE UR STRIP-MCNC: NEGATIVE MG/DL
HCG SERPL QL: NEGATIVE
HCT VFR BLD AUTO: 38.9 % (ref 35–47)
HGB BLD-MCNC: 12.9 G/DL (ref 11.7–15.7)
HGB UR QL STRIP: ABNORMAL
HOLD SPECIMEN: NORMAL
HOLD SPECIMEN: NORMAL
IMM GRANULOCYTES # BLD: 0 10E3/UL
IMM GRANULOCYTES NFR BLD: 0 %
KETONES UR STRIP-MCNC: NEGATIVE MG/DL
LEUKOCYTE ESTERASE UR QL STRIP: NEGATIVE
LYMPHOCYTES # BLD AUTO: 3.7 10E3/UL (ref 0.8–5.3)
LYMPHOCYTES NFR BLD AUTO: 58 %
MCH RBC QN AUTO: 30.9 PG (ref 26.5–33)
MCHC RBC AUTO-ENTMCNC: 33.2 G/DL (ref 31.5–36.5)
MCV RBC AUTO: 93 FL (ref 78–100)
MONOCYTES # BLD AUTO: 0.5 10E3/UL (ref 0–1.3)
MONOCYTES NFR BLD AUTO: 7 %
N GONORRHOEA DNA SPEC QL NAA+PROBE: NEGATIVE
NEUTROPHILS # BLD AUTO: 2.1 10E3/UL (ref 1.6–8.3)
NEUTROPHILS NFR BLD AUTO: 34 %
NITRATE UR QL: NEGATIVE
NRBC # BLD AUTO: 0 10E3/UL
NRBC BLD AUTO-RTO: 0 /100
PH UR STRIP: 7.5 [PH] (ref 5–7)
PLATELET # BLD AUTO: 326 10E3/UL (ref 150–450)
POTASSIUM SERPL-SCNC: 3.3 MMOL/L (ref 3.4–5.3)
RBC # BLD AUTO: 4.17 10E6/UL (ref 3.8–5.2)
RBC URINE: 45 /HPF
RSV RNA SPEC NAA+PROBE: NEGATIVE
SARS-COV-2 RNA RESP QL NAA+PROBE: NEGATIVE
SODIUM SERPL-SCNC: 140 MMOL/L (ref 135–145)
SP GR UR STRIP: 1 (ref 1–1.03)
SQUAMOUS EPITHELIAL: 4 /HPF
TRICHOMONAS, WET PREP: NORMAL
UROBILINOGEN UR STRIP-MCNC: NORMAL MG/DL
WBC # BLD AUTO: 6.4 10E3/UL (ref 4–11)
WBC URINE: 2 /HPF
WBC'S/HIGH POWER FIELD, WET PREP: NORMAL
YEAST, WET PREP: NORMAL

## 2023-11-30 PROCEDURE — 85025 COMPLETE CBC W/AUTO DIFF WBC: CPT | Performed by: EMERGENCY MEDICINE

## 2023-11-30 PROCEDURE — 74177 CT ABD & PELVIS W/CONTRAST: CPT

## 2023-11-30 PROCEDURE — 87210 SMEAR WET MOUNT SALINE/INK: CPT | Performed by: EMERGENCY MEDICINE

## 2023-11-30 PROCEDURE — 80048 BASIC METABOLIC PNL TOTAL CA: CPT | Performed by: EMERGENCY MEDICINE

## 2023-11-30 PROCEDURE — 87491 CHLMYD TRACH DNA AMP PROBE: CPT | Performed by: EMERGENCY MEDICINE

## 2023-11-30 PROCEDURE — 258N000003 HC RX IP 258 OP 636: Performed by: EMERGENCY MEDICINE

## 2023-11-30 PROCEDURE — 87637 SARSCOV2&INF A&B&RSV AMP PRB: CPT | Performed by: EMERGENCY MEDICINE

## 2023-11-30 PROCEDURE — 250N000009 HC RX 250: Performed by: EMERGENCY MEDICINE

## 2023-11-30 PROCEDURE — 82310 ASSAY OF CALCIUM: CPT | Performed by: EMERGENCY MEDICINE

## 2023-11-30 PROCEDURE — 84703 CHORIONIC GONADOTROPIN ASSAY: CPT | Performed by: EMERGENCY MEDICINE

## 2023-11-30 PROCEDURE — 250N000011 HC RX IP 250 OP 636: Mod: JZ | Performed by: EMERGENCY MEDICINE

## 2023-11-30 PROCEDURE — 250N000013 HC RX MED GY IP 250 OP 250 PS 637: Performed by: EMERGENCY MEDICINE

## 2023-11-30 PROCEDURE — 250N000011 HC RX IP 250 OP 636: Performed by: EMERGENCY MEDICINE

## 2023-11-30 PROCEDURE — 81001 URINALYSIS AUTO W/SCOPE: CPT | Performed by: EMERGENCY MEDICINE

## 2023-11-30 PROCEDURE — 36415 COLL VENOUS BLD VENIPUNCTURE: CPT | Performed by: EMERGENCY MEDICINE

## 2023-11-30 PROCEDURE — 87591 N.GONORRHOEAE DNA AMP PROB: CPT | Performed by: EMERGENCY MEDICINE

## 2023-11-30 RX ORDER — POTASSIUM CHLORIDE 1.5 G/1.58G
40 POWDER, FOR SOLUTION ORAL ONCE
Status: COMPLETED | OUTPATIENT
Start: 2023-11-30 | End: 2023-11-30

## 2023-11-30 RX ORDER — TAMSULOSIN HYDROCHLORIDE 0.4 MG/1
0.4 CAPSULE ORAL DAILY
Qty: 10 CAPSULE | Refills: 0 | Status: SHIPPED | OUTPATIENT
Start: 2023-11-30 | End: 2023-12-10

## 2023-11-30 RX ORDER — OXYCODONE HYDROCHLORIDE 5 MG/1
5 TABLET ORAL EVERY 6 HOURS PRN
Qty: 12 TABLET | Refills: 0 | Status: SHIPPED | OUTPATIENT
Start: 2023-11-30 | End: 2023-12-03

## 2023-11-30 RX ORDER — KETOROLAC TROMETHAMINE 15 MG/ML
15 INJECTION, SOLUTION INTRAMUSCULAR; INTRAVENOUS ONCE
Status: COMPLETED | OUTPATIENT
Start: 2023-11-30 | End: 2023-11-30

## 2023-11-30 RX ORDER — IOPAMIDOL 755 MG/ML
500 INJECTION, SOLUTION INTRAVASCULAR ONCE
Status: COMPLETED | OUTPATIENT
Start: 2023-11-30 | End: 2023-11-30

## 2023-11-30 RX ORDER — HYDROMORPHONE HYDROCHLORIDE 1 MG/ML
0.5 INJECTION, SOLUTION INTRAMUSCULAR; INTRAVENOUS; SUBCUTANEOUS ONCE
Status: COMPLETED | OUTPATIENT
Start: 2023-11-30 | End: 2023-11-30

## 2023-11-30 RX ORDER — ONDANSETRON 4 MG/1
4 TABLET, ORALLY DISINTEGRATING ORAL EVERY 8 HOURS PRN
Qty: 10 TABLET | Refills: 0 | Status: SHIPPED | OUTPATIENT
Start: 2023-11-30 | End: 2023-12-03

## 2023-11-30 RX ORDER — ONDANSETRON 2 MG/ML
4 INJECTION INTRAMUSCULAR; INTRAVENOUS ONCE
Status: COMPLETED | OUTPATIENT
Start: 2023-11-30 | End: 2023-11-30

## 2023-11-30 RX ORDER — IBUPROFEN 600 MG/1
600 TABLET, FILM COATED ORAL EVERY 6 HOURS PRN
Qty: 20 TABLET | Refills: 0 | Status: SHIPPED | OUTPATIENT
Start: 2023-11-30 | End: 2023-12-30

## 2023-11-30 RX ADMIN — POTASSIUM CHLORIDE 40 MEQ: 1.5 POWDER, FOR SOLUTION ORAL at 04:00

## 2023-11-30 RX ADMIN — KETOROLAC TROMETHAMINE 15 MG: 15 INJECTION, SOLUTION INTRAMUSCULAR; INTRAVENOUS at 02:16

## 2023-11-30 RX ADMIN — ONDANSETRON 4 MG: 2 INJECTION INTRAMUSCULAR; INTRAVENOUS at 02:16

## 2023-11-30 RX ADMIN — SODIUM CHLORIDE 59 ML: 9 INJECTION, SOLUTION INTRAVENOUS at 02:59

## 2023-11-30 RX ADMIN — IOPAMIDOL 75 ML: 755 INJECTION, SOLUTION INTRAVENOUS at 02:59

## 2023-11-30 RX ADMIN — SODIUM CHLORIDE 1000 ML: 9 INJECTION, SOLUTION INTRAVENOUS at 02:16

## 2023-11-30 RX ADMIN — HYDROMORPHONE HYDROCHLORIDE 0.5 MG: 1 INJECTION, SOLUTION INTRAMUSCULAR; INTRAVENOUS; SUBCUTANEOUS at 02:16

## 2023-11-30 ASSESSMENT — ACTIVITIES OF DAILY LIVING (ADL): ADLS_ACUITY_SCORE: 35

## 2023-11-30 NOTE — ED TRIAGE NOTES
Patient arrives from home for evaluation of abdominal pain and N/V. Patient reports waking up with sharp LLQ pain 1 hour PTA. Patient reports sudden onset of nausea and vomiting. Patient denies any symptoms of illness prior to this evening.      Triage Assessment (Adult)       Row Name 11/29/23 1571          Triage Assessment    Airway WDL WDL        Respiratory WDL    Respiratory WDL WDL        Skin Circulation/Temperature WDL    Skin Circulation/Temperature WDL WDL        Cardiac WDL    Cardiac WDL WDL        Peripheral/Neurovascular WDL    Peripheral Neurovascular WDL WDL        Cognitive/Neuro/Behavioral WDL    Cognitive/Neuro/Behavioral WDL WDL

## 2023-11-30 NOTE — ED PROVIDER NOTES
History     Chief Complaint:  Abdominal Pain       HPI   Hansa Ortiz is a 28 year old female presenting with abdominal pain amongst other complaints.  Patient reports around 11 PM developing a sudden onset left lower quadrant sharp pain that was initially intermittent though is now constant.  Pain does not radiate. She reports accompanying nausea and nonbilious, nonbloody emesis.  She reports subjective chills as well as noting having looser nonbloody stools earlier in the day.  She also notes white vaginal discharge though denies any cough, sore throat, dysuria, vaginal bleeding.  No concerns for sexually transmitted diseases.  No history of kidney stones, diverticulitis, suspicious foods.  She notes child had RSV last week, no other sick contacts.  She has not taken anything for analgesia.      Independent Historian:   None - Patient Only    Review of External Notes:    8/17/23 office visit      Medications:    acetaminophen (TYLENOL) 325 MG tablet  cyclobenzaprine (FLEXERIL) 10 MG tablet  etonogestrel-ethinyl estradiol (NUVARING) 0.12-0.015 MG/24HR vaginal ring  ibuprofen (ADVIL/MOTRIN) 600 MG tablet        Past Medical History:    Past Medical History:   Diagnosis Date    Abnormal uterine bleeding     Back pain     OSEI III (cervical intraepithelial neoplasia III) 01/03/2019    Hx of abnormal cervical Pap smear     Trichomonas infection 01/2020       Past Surgical History:    Past Surgical History:   Procedure Laterality Date    LEEP TX, CERVICAL  01/01/2019    OPERATIVE HYSTEROSCOPY WITH MORCELLATOR N/A 9/7/2022    Procedure: OPERATIVE HYSTEROSCOPY WITH MORCELLATOR (MYOSURE);  Surgeon: Rayna Noel MD;  Location:  OR        Physical Exam   Patient Vitals for the past 24 hrs:   BP Temp Temp src Pulse Resp SpO2   11/29/23 2354 (!) 149/95 97.4  F (36.3  C) Temporal 67 18 100 %        Physical Exam  Nursing note and vitals reviewed.  Constitutional: Well nourished.   Eyes: Conjunctiva normal.   Pupils are equal, round, and reactive to light.   ENT: Nose normal. Mucous membranes pink and moist.    Neck: Normal range of motion.  CVS: Normal rate, regular rhythm.  Normal heart sounds.   Pulmonary: Lungs clear to auscultation bilaterally. No wheezes/rales/rhonchi.  GI: Abdomen soft. LLQ tenderness. No rigidity or guarding.  No CVA tenderness  Pelvic: patient declined  MSK: No calf tenderness or swelling.  Neuro: Alert. Follows simple commands.  Skin: Skin is warm and dry. No rash noted.   Psychiatric: Normal affect.     Emergency Department Course     Imaging:  Abd/pelvis CT,  IV  contrast only TRAUMA / AAA   Final Result   IMPRESSION:    1.  0.3 cm distal left ureteral calculus at the ureterovesicular junction, resulting in minimal obstruction.   2.  A few small nonobstructing bilateral renal calculi.              Laboratory:  Labs Ordered and Resulted from Time of ED Arrival to Time of ED Departure   BASIC METABOLIC PANEL - Abnormal       Result Value    Sodium 140      Potassium 3.3 (*)     Chloride 102      Carbon Dioxide (CO2) 24      Anion Gap 14      Urea Nitrogen 9.1      Creatinine 0.71      GFR Estimate >90      Calcium 9.5      Glucose 115 (*)    ROUTINE UA WITH MICROSCOPIC - Abnormal    Color Urine Light Yellow      Appearance Urine Clear      Glucose Urine Negative      Bilirubin Urine Negative      Ketones Urine Negative      Specific Gravity Urine 1.005      Blood Urine Small (*)     pH Urine 7.5 (*)     Protein Albumin Urine 10 (*)     Urobilinogen Urine Normal      Nitrite Urine Negative      Leukocyte Esterase Urine Negative      RBC Urine 45 (*)     WBC Urine 2      Squamous Epithelials Urine 4 (*)    HCG QUALITATIVE PREGNANCY - Normal    hCG Serum Qualitative Negative     INFLUENZA A/B, RSV, & SARS-COV2 PCR - Normal    Influenza A PCR Negative      Influenza B PCR Negative      RSV PCR Negative      SARS CoV2 PCR Negative     WET PREPARATION - Normal    Trichomonas Absent      Yeast Absent       Clue Cells Absent      WBCs/high power field None     CBC WITH PLATELETS AND DIFFERENTIAL    WBC Count 6.4      RBC Count 4.17      Hemoglobin 12.9      Hematocrit 38.9      MCV 93      MCH 30.9      MCHC 33.2      RDW 13.1      Platelet Count 326      % Neutrophils 34      % Lymphocytes 58      % Monocytes 7      % Eosinophils 1      % Basophils 0      % Immature Granulocytes 0      NRBCs per 100 WBC 0      Absolute Neutrophils 2.1      Absolute Lymphocytes 3.7      Absolute Monocytes 0.5      Absolute Eosinophils 0.1      Absolute Basophils 0.0      Absolute Immature Granulocytes 0.0      Absolute NRBCs 0.0     CHLAMYDIA TRACHOMATIS/NEISSERIA GONORRHOEAE BY PCR            Emergency Department Course & Assessments:             Interventions:  Medications   ketorolac (TORADOL) injection 15 mg (15 mg Intravenous $Given 11/30/23 0216)   ondansetron (ZOFRAN) injection 4 mg (4 mg Intravenous $Given 11/30/23 0216)   HYDROmorphone (PF) (DILAUDID) injection 0.5 mg (0.5 mg Intravenous $Given 11/30/23 0216)   sodium chloride 0.9% BOLUS 1,000 mL (0 mLs Intravenous Stopped 11/30/23 0356)   potassium chloride (KLOR-CON) Packet 40 mEq (40 mEq Oral $Given 11/30/23 0400)   iopamidol (ISOVUE-370) solution 500 mL (75 mLs Intravenous $Given 11/30/23 0259)   for CT scan flush use (59 mLs Intravenous $Given 11/30/23 0259)      Consultations/Discussion of Management or Tests:  None        Social Determinants of Health affecting care:   None    Disposition:  The patient was discharged to home.     Impression & Plan        Medical Decision Making:  Patient is a 28-year-old female presenting with a myriad of complaints though predominantly left lower abdominal pain.  She also notes vomiting and vaginal discharge.  She is nontoxic on arrival, clinically well-hydrated.  Work-up does show concerns for obstructing renal calculi which I suspect is largely contributing to her presentation.  UA without evidence of obvious infection.  No  evidence to suggest underlying sepsis.  Regarding her vaginal discharge, I did offer formal pelvic exam that she is declining today.  She expressed understanding of missed or delayed diagnoses.  Wet prep negative.  Gonorrhea/chlamydia have been sent though patient denies concerns for STIs.  Clinically have a lower suspicion for pelvic etiology including but not limited to PID/ovarian torsion to explain her presentation.  After analgesia, she reported significant symptom improvement.  We will plan for analgesia with Tylenol/Motrin/oxycodone as well as antiemetics and Flomax on discharge.  Strain urine with plans for close outpatient urology follow-up.  Monitor for fever, increasing abdominal pain, protracted vomiting or should symptoms worsen or change to promptly represent.      Diagnosis:    ICD-10-CM    1. Calculus of kidney  N20.0       2. Abdominal pain, unspecified abdominal location  R10.9            Discharge Medications:  Discharge Medication List as of 11/30/2023  3:56 AM        START taking these medications    Details   !! ibuprofen (ADVIL/MOTRIN) 600 MG tablet Take 1 tablet (600 mg) by mouth every 6 hours as needed for moderate pain, Disp-20 tablet, R-0, Local Print      ondansetron (ZOFRAN ODT) 4 MG ODT tab Take 1 tablet (4 mg) by mouth every 8 hours as needed for nausea, Disp-10 tablet, R-0, Local Print      oxyCODONE (ROXICODONE) 5 MG tablet Take 1 tablet (5 mg) by mouth every 6 hours as needed for pain, Disp-12 tablet, R-0, Local Print      tamsulosin (FLOMAX) 0.4 MG capsule Take 1 capsule (0.4 mg) by mouth daily for 10 doses, Disp-10 capsule, R-0, Local Print       !! - Potential duplicate medications found. Please discuss with provider.           11/30/2023   Jaja Romero, Jaja Lozano,   11/30/23 0509

## 2023-11-30 NOTE — Clinical Note
Hansa Ortiz was seen and treated in our emergency department on 11/29/2023.  She may return to work on 12/01/2023.       If you have any questions or concerns, please don't hesitate to call.      Jaja Romero, DO

## 2023-11-30 NOTE — RESULT ENCOUNTER NOTE
Final result for both N. Gonorrhoeae PCR and Chlamydia Trachomatis PCR are NEGATIVE.  No treatment or change in treatment per [facility] ED Lab Result N. Gonorrhea AND/OR Chlamydia T. protocol.

## 2023-12-01 ENCOUNTER — HOSPITAL ENCOUNTER (OUTPATIENT)
Facility: CLINIC | Age: 28
Setting detail: OBSERVATION
Discharge: HOME | End: 2023-12-02
Attending: EMERGENCY MEDICINE
Payer: COMMERCIAL

## 2023-12-01 ENCOUNTER — APPOINTMENT (OUTPATIENT)
Dept: GENERAL RADIOLOGY | Facility: CLINIC | Age: 28
End: 2023-12-01
Attending: EMERGENCY MEDICINE
Payer: COMMERCIAL

## 2023-12-01 ENCOUNTER — NURSE TRIAGE (OUTPATIENT)
Dept: NURSING | Facility: CLINIC | Age: 28
End: 2023-12-01
Payer: COMMERCIAL

## 2023-12-01 DIAGNOSIS — N20.0 KIDNEY STONE ON LEFT SIDE: ICD-10-CM

## 2023-12-01 DIAGNOSIS — N20.1 URETERAL STONE: ICD-10-CM

## 2023-12-01 DIAGNOSIS — N20.1 LEFT URETERAL STONE: Primary | ICD-10-CM

## 2023-12-01 DIAGNOSIS — R10.9 FLANK PAIN: ICD-10-CM

## 2023-12-01 LAB
ALBUMIN UR-MCNC: 10 MG/DL
ANION GAP SERPL CALCULATED.3IONS-SCNC: 16 MMOL/L (ref 7–15)
APPEARANCE UR: CLEAR
BACTERIA #/AREA URNS HPF: ABNORMAL /HPF
BASOPHILS # BLD AUTO: 0 10E3/UL (ref 0–0.2)
BASOPHILS NFR BLD AUTO: 0 %
BILIRUB UR QL STRIP: NEGATIVE
BUN SERPL-MCNC: 12.7 MG/DL (ref 6–20)
CALCIUM SERPL-MCNC: 9.1 MG/DL (ref 8.6–10)
CHLORIDE SERPL-SCNC: 103 MMOL/L (ref 98–107)
COLOR UR AUTO: ABNORMAL
CREAT SERPL-MCNC: 0.94 MG/DL (ref 0.51–0.95)
DEPRECATED HCO3 PLAS-SCNC: 19 MMOL/L (ref 22–29)
EGFRCR SERPLBLD CKD-EPI 2021: 83 ML/MIN/1.73M2
EOSINOPHIL # BLD AUTO: 0 10E3/UL (ref 0–0.7)
EOSINOPHIL NFR BLD AUTO: 0 %
ERYTHROCYTE [DISTWIDTH] IN BLOOD BY AUTOMATED COUNT: 12.8 % (ref 10–15)
GLUCOSE SERPL-MCNC: 139 MG/DL (ref 70–99)
GLUCOSE UR STRIP-MCNC: NEGATIVE MG/DL
HCG UR QL: NEGATIVE
HCT VFR BLD AUTO: 37 % (ref 35–47)
HGB BLD-MCNC: 12.1 G/DL (ref 11.7–15.7)
HGB UR QL STRIP: ABNORMAL
IMM GRANULOCYTES # BLD: 0.1 10E3/UL
IMM GRANULOCYTES NFR BLD: 1 %
KETONES UR STRIP-MCNC: 60 MG/DL
LACTATE SERPL-SCNC: 2.7 MMOL/L (ref 0.7–2)
LEUKOCYTE ESTERASE UR QL STRIP: ABNORMAL
LYMPHOCYTES # BLD AUTO: 1.3 10E3/UL (ref 0.8–5.3)
LYMPHOCYTES NFR BLD AUTO: 11 %
MCH RBC QN AUTO: 30.8 PG (ref 26.5–33)
MCHC RBC AUTO-ENTMCNC: 32.7 G/DL (ref 31.5–36.5)
MCV RBC AUTO: 94 FL (ref 78–100)
MONOCYTES # BLD AUTO: 0.7 10E3/UL (ref 0–1.3)
MONOCYTES NFR BLD AUTO: 6 %
MUCOUS THREADS #/AREA URNS LPF: PRESENT /LPF
NEUTROPHILS # BLD AUTO: 10.5 10E3/UL (ref 1.6–8.3)
NEUTROPHILS NFR BLD AUTO: 82 %
NITRATE UR QL: NEGATIVE
NRBC # BLD AUTO: 0 10E3/UL
NRBC BLD AUTO-RTO: 0 /100
PH UR STRIP: 7.5 [PH] (ref 5–7)
PLATELET # BLD AUTO: 296 10E3/UL (ref 150–450)
POTASSIUM SERPL-SCNC: 3.5 MMOL/L (ref 3.4–5.3)
RBC # BLD AUTO: 3.93 10E6/UL (ref 3.8–5.2)
RBC URINE: 53 /HPF
SODIUM SERPL-SCNC: 138 MMOL/L (ref 135–145)
SP GR UR STRIP: 1.02 (ref 1–1.03)
SQUAMOUS EPITHELIAL: 3 /HPF
UROBILINOGEN UR STRIP-MCNC: 2 MG/DL
WBC # BLD AUTO: 12.6 10E3/UL (ref 4–11)
WBC URINE: 3 /HPF

## 2023-12-01 PROCEDURE — 250N000013 HC RX MED GY IP 250 OP 250 PS 637

## 2023-12-01 PROCEDURE — 258N000003 HC RX IP 258 OP 636: Performed by: EMERGENCY MEDICINE

## 2023-12-01 PROCEDURE — 250N000011 HC RX IP 250 OP 636: Mod: JZ

## 2023-12-01 PROCEDURE — 250N000011 HC RX IP 250 OP 636: Mod: JZ | Performed by: EMERGENCY MEDICINE

## 2023-12-01 PROCEDURE — 99222 1ST HOSP IP/OBS MODERATE 55: CPT | Mod: AI

## 2023-12-01 PROCEDURE — 36415 COLL VENOUS BLD VENIPUNCTURE: CPT

## 2023-12-01 PROCEDURE — 36415 COLL VENOUS BLD VENIPUNCTURE: CPT | Performed by: EMERGENCY MEDICINE

## 2023-12-01 PROCEDURE — 96375 TX/PRO/DX INJ NEW DRUG ADDON: CPT

## 2023-12-01 PROCEDURE — 96376 TX/PRO/DX INJ SAME DRUG ADON: CPT

## 2023-12-01 PROCEDURE — 85025 COMPLETE CBC W/AUTO DIFF WBC: CPT | Performed by: EMERGENCY MEDICINE

## 2023-12-01 PROCEDURE — 81025 URINE PREGNANCY TEST: CPT | Performed by: EMERGENCY MEDICINE

## 2023-12-01 PROCEDURE — 74018 RADEX ABDOMEN 1 VIEW: CPT

## 2023-12-01 PROCEDURE — 96361 HYDRATE IV INFUSION ADD-ON: CPT

## 2023-12-01 PROCEDURE — 99285 EMERGENCY DEPT VISIT HI MDM: CPT | Mod: 25

## 2023-12-01 PROCEDURE — 96374 THER/PROPH/DIAG INJ IV PUSH: CPT

## 2023-12-01 PROCEDURE — 81001 URINALYSIS AUTO W/SCOPE: CPT | Performed by: EMERGENCY MEDICINE

## 2023-12-01 PROCEDURE — 258N000003 HC RX IP 258 OP 636

## 2023-12-01 PROCEDURE — 83605 ASSAY OF LACTIC ACID: CPT | Performed by: UROLOGY

## 2023-12-01 PROCEDURE — 36415 COLL VENOUS BLD VENIPUNCTURE: CPT | Performed by: UROLOGY

## 2023-12-01 PROCEDURE — G0378 HOSPITAL OBSERVATION PER HR: HCPCS

## 2023-12-01 PROCEDURE — 80048 BASIC METABOLIC PNL TOTAL CA: CPT | Performed by: EMERGENCY MEDICINE

## 2023-12-01 RX ORDER — CEFTRIAXONE 1 G/1
1 INJECTION, POWDER, FOR SOLUTION INTRAMUSCULAR; INTRAVENOUS EVERY 24 HOURS
Status: DISCONTINUED | OUTPATIENT
Start: 2023-12-01 | End: 2023-12-02 | Stop reason: HOSPADM

## 2023-12-01 RX ORDER — NALOXONE HYDROCHLORIDE 0.4 MG/ML
0.2 INJECTION, SOLUTION INTRAMUSCULAR; INTRAVENOUS; SUBCUTANEOUS
Status: DISCONTINUED | OUTPATIENT
Start: 2023-12-01 | End: 2023-12-02 | Stop reason: HOSPADM

## 2023-12-01 RX ORDER — CYCLOBENZAPRINE HCL 10 MG
10 TABLET ORAL DAILY
Status: DISCONTINUED | OUTPATIENT
Start: 2023-12-02 | End: 2023-12-02 | Stop reason: HOSPADM

## 2023-12-01 RX ORDER — ONDANSETRON 2 MG/ML
4 INJECTION INTRAMUSCULAR; INTRAVENOUS ONCE
Status: COMPLETED | OUTPATIENT
Start: 2023-12-01 | End: 2023-12-01

## 2023-12-01 RX ORDER — NALOXONE HYDROCHLORIDE 0.4 MG/ML
0.4 INJECTION, SOLUTION INTRAMUSCULAR; INTRAVENOUS; SUBCUTANEOUS
Status: DISCONTINUED | OUTPATIENT
Start: 2023-12-01 | End: 2023-12-02 | Stop reason: HOSPADM

## 2023-12-01 RX ORDER — HYDROMORPHONE HCL IN WATER/PF 6 MG/30 ML
0.2 PATIENT CONTROLLED ANALGESIA SYRINGE INTRAVENOUS
Status: DISCONTINUED | OUTPATIENT
Start: 2023-12-01 | End: 2023-12-02 | Stop reason: HOSPADM

## 2023-12-01 RX ORDER — HYDROMORPHONE HCL IN WATER/PF 6 MG/30 ML
0.4 PATIENT CONTROLLED ANALGESIA SYRINGE INTRAVENOUS
Status: DISCONTINUED | OUTPATIENT
Start: 2023-12-01 | End: 2023-12-02 | Stop reason: HOSPADM

## 2023-12-01 RX ORDER — ONDANSETRON 4 MG/1
4 TABLET, ORALLY DISINTEGRATING ORAL EVERY 6 HOURS PRN
Status: DISCONTINUED | OUTPATIENT
Start: 2023-12-02 | End: 2023-12-02 | Stop reason: HOSPADM

## 2023-12-01 RX ORDER — SODIUM CHLORIDE, SODIUM LACTATE, POTASSIUM CHLORIDE, CALCIUM CHLORIDE 600; 310; 30; 20 MG/100ML; MG/100ML; MG/100ML; MG/100ML
INJECTION, SOLUTION INTRAVENOUS CONTINUOUS
Status: DISCONTINUED | OUTPATIENT
Start: 2023-12-01 | End: 2023-12-02 | Stop reason: HOSPADM

## 2023-12-01 RX ORDER — TAMSULOSIN HYDROCHLORIDE 0.4 MG/1
0.4 CAPSULE ORAL DAILY
Status: DISCONTINUED | OUTPATIENT
Start: 2023-12-01 | End: 2023-12-02 | Stop reason: HOSPADM

## 2023-12-01 RX ORDER — AMOXICILLIN 250 MG
2 CAPSULE ORAL 2 TIMES DAILY PRN
Status: DISCONTINUED | OUTPATIENT
Start: 2023-12-01 | End: 2023-12-02 | Stop reason: HOSPADM

## 2023-12-01 RX ORDER — KETOROLAC TROMETHAMINE 30 MG/ML
30 INJECTION, SOLUTION INTRAMUSCULAR; INTRAVENOUS EVERY 6 HOURS PRN
Status: DISCONTINUED | OUTPATIENT
Start: 2023-12-01 | End: 2023-12-02 | Stop reason: HOSPADM

## 2023-12-01 RX ORDER — LIDOCAINE 40 MG/G
CREAM TOPICAL
Status: DISCONTINUED | OUTPATIENT
Start: 2023-12-01 | End: 2023-12-02 | Stop reason: HOSPADM

## 2023-12-01 RX ORDER — ACETAMINOPHEN 325 MG/1
975 TABLET ORAL 3 TIMES DAILY
Status: DISCONTINUED | OUTPATIENT
Start: 2023-12-01 | End: 2023-12-02 | Stop reason: HOSPADM

## 2023-12-01 RX ORDER — OXYCODONE HYDROCHLORIDE 5 MG/1
5 TABLET ORAL EVERY 4 HOURS PRN
Status: DISCONTINUED | OUTPATIENT
Start: 2023-12-01 | End: 2023-12-02 | Stop reason: HOSPADM

## 2023-12-01 RX ORDER — HYDROMORPHONE HYDROCHLORIDE 1 MG/ML
0.5 INJECTION, SOLUTION INTRAMUSCULAR; INTRAVENOUS; SUBCUTANEOUS ONCE
Status: COMPLETED | OUTPATIENT
Start: 2023-12-01 | End: 2023-12-01

## 2023-12-01 RX ORDER — KETOROLAC TROMETHAMINE 15 MG/ML
15 INJECTION, SOLUTION INTRAMUSCULAR; INTRAVENOUS ONCE
Status: COMPLETED | OUTPATIENT
Start: 2023-12-01 | End: 2023-12-01

## 2023-12-01 RX ORDER — AMOXICILLIN 250 MG
1 CAPSULE ORAL 2 TIMES DAILY PRN
Status: DISCONTINUED | OUTPATIENT
Start: 2023-12-01 | End: 2023-12-02 | Stop reason: HOSPADM

## 2023-12-01 RX ORDER — PROCHLORPERAZINE MALEATE 10 MG
10 TABLET ORAL EVERY 6 HOURS PRN
Status: DISCONTINUED | OUTPATIENT
Start: 2023-12-01 | End: 2023-12-02 | Stop reason: HOSPADM

## 2023-12-01 RX ORDER — ONDANSETRON 4 MG/1
4 TABLET, ORALLY DISINTEGRATING ORAL EVERY 6 HOURS PRN
Status: DISCONTINUED | OUTPATIENT
Start: 2023-12-01 | End: 2023-12-01

## 2023-12-01 RX ORDER — PROCHLORPERAZINE 25 MG
25 SUPPOSITORY, RECTAL RECTAL EVERY 12 HOURS PRN
Status: DISCONTINUED | OUTPATIENT
Start: 2023-12-01 | End: 2023-12-02 | Stop reason: HOSPADM

## 2023-12-01 RX ORDER — IBUPROFEN 600 MG/1
600 TABLET, FILM COATED ORAL EVERY 6 HOURS PRN
Status: DISCONTINUED | OUTPATIENT
Start: 2023-12-01 | End: 2023-12-01

## 2023-12-01 RX ORDER — ONDANSETRON 2 MG/ML
4 INJECTION INTRAMUSCULAR; INTRAVENOUS EVERY 6 HOURS PRN
Status: DISCONTINUED | OUTPATIENT
Start: 2023-12-02 | End: 2023-12-02 | Stop reason: HOSPADM

## 2023-12-01 RX ORDER — ONDANSETRON 2 MG/ML
4 INJECTION INTRAMUSCULAR; INTRAVENOUS EVERY 6 HOURS PRN
Status: DISCONTINUED | OUTPATIENT
Start: 2023-12-01 | End: 2023-12-01

## 2023-12-01 RX ADMIN — SODIUM CHLORIDE 1000 ML: 9 INJECTION, SOLUTION INTRAVENOUS at 14:39

## 2023-12-01 RX ADMIN — ONDANSETRON 4 MG: 2 INJECTION INTRAMUSCULAR; INTRAVENOUS at 14:38

## 2023-12-01 RX ADMIN — ONDANSETRON 4 MG: 2 INJECTION INTRAMUSCULAR; INTRAVENOUS at 19:42

## 2023-12-01 RX ADMIN — SODIUM CHLORIDE, POTASSIUM CHLORIDE, SODIUM LACTATE AND CALCIUM CHLORIDE: 600; 310; 30; 20 INJECTION, SOLUTION INTRAVENOUS at 21:48

## 2023-12-01 RX ADMIN — KETOROLAC TROMETHAMINE 30 MG: 30 INJECTION, SOLUTION INTRAMUSCULAR; INTRAVENOUS at 21:45

## 2023-12-01 RX ADMIN — PROCHLORPERAZINE EDISYLATE 10 MG: 5 INJECTION INTRAMUSCULAR; INTRAVENOUS at 21:56

## 2023-12-01 RX ADMIN — TAMSULOSIN HYDROCHLORIDE 0.4 MG: 0.4 CAPSULE ORAL at 20:58

## 2023-12-01 RX ADMIN — ONDANSETRON 4 MG: 2 INJECTION INTRAMUSCULAR; INTRAVENOUS at 16:31

## 2023-12-01 RX ADMIN — ACETAMINOPHEN 975 MG: 325 TABLET, FILM COATED ORAL at 21:33

## 2023-12-01 RX ADMIN — OXYCODONE HYDROCHLORIDE 5 MG: 5 TABLET ORAL at 21:34

## 2023-12-01 RX ADMIN — HYDROMORPHONE HYDROCHLORIDE 0.5 MG: 1 INJECTION, SOLUTION INTRAMUSCULAR; INTRAVENOUS; SUBCUTANEOUS at 17:20

## 2023-12-01 RX ADMIN — HYDROMORPHONE HYDROCHLORIDE 0.5 MG: 1 INJECTION, SOLUTION INTRAMUSCULAR; INTRAVENOUS; SUBCUTANEOUS at 19:42

## 2023-12-01 RX ADMIN — HYDROMORPHONE HYDROCHLORIDE 0.5 MG: 1 INJECTION, SOLUTION INTRAMUSCULAR; INTRAVENOUS; SUBCUTANEOUS at 14:39

## 2023-12-01 RX ADMIN — CEFTRIAXONE 1 G: 1 INJECTION, POWDER, FOR SOLUTION INTRAMUSCULAR; INTRAVENOUS at 20:55

## 2023-12-01 RX ADMIN — KETOROLAC TROMETHAMINE 15 MG: 15 INJECTION, SOLUTION INTRAMUSCULAR; INTRAVENOUS at 14:39

## 2023-12-01 ASSESSMENT — ACTIVITIES OF DAILY LIVING (ADL)
ADLS_ACUITY_SCORE: 35
ADLS_ACUITY_SCORE: 33
ADLS_ACUITY_SCORE: 35

## 2023-12-01 NOTE — ED PROVIDER NOTES
History     Chief Complaint:  Flank Pain       HPI   Hansa Ortiz is a 28 year old female who presents with abdominal pain.  Patient reports that she presented to the ER a few days ago for kidney stones.  She was discharged home.  She has been up and working.  Today she threw up all her medications and has not been able to take her pain medicines.  She was laying on the bathroom floor and called 911 after speak with the triage nurses.  She denies any fevers, chills.  Denies chance of pregnancy.  Feels like the pain is the same as when she presented several days ago.  She denies any chance of pregnancy.  She reports that she was running to the bathroom in the ER when she slipped and fell and hit her head.  Did not lose conscious.  Does not take blood thinners.  No weakness numbness or tingling.  No other injuries from this fall.      Independent Historian:    none    Review of External Notes:  Reviewed nurse note from 12/1/23.  It appears that the triage nurse did call 911 for patient's symptoms.    Medications:    acetaminophen (TYLENOL) 325 MG tablet  cyclobenzaprine (FLEXERIL) 10 MG tablet  etonogestrel-ethinyl estradiol (NUVARING) 0.12-0.015 MG/24HR vaginal ring  ibuprofen (ADVIL/MOTRIN) 600 MG tablet  ibuprofen (ADVIL/MOTRIN) 600 MG tablet  ondansetron (ZOFRAN ODT) 4 MG ODT tab  oxyCODONE (ROXICODONE) 5 MG tablet  tamsulosin (FLOMAX) 0.4 MG capsule        Past Medical History:    Past Medical History:   Diagnosis Date    Abnormal uterine bleeding     Back pain     OSEI III (cervical intraepithelial neoplasia III) 01/03/2019    Hx of abnormal cervical Pap smear     Trichomonas infection 01/2020       Past Surgical History:    Past Surgical History:   Procedure Laterality Date    LEEP TX, CERVICAL  01/01/2019    OPERATIVE HYSTEROSCOPY WITH MORCELLATOR N/A 9/7/2022    Procedure: OPERATIVE HYSTEROSCOPY WITH MORCELLATOR (MYOSURE);  Surgeon: Rayna Noel MD;  Location:  OR          Physical Exam    Patient Vitals for the past 24 hrs:   BP Temp Temp src Pulse Resp SpO2   12/01/23 1445 124/86 -- -- -- -- 99 %   12/01/23 1155 -- 97  F (36.1  C) Temporal -- -- --   12/01/23 1150 135/51 -- -- 80 16 100 %        Physical Exam  General: Resting on the bed.  Head: No obvious trauma to head. No palpable contusion or step off noted   Ears, Nose, Throat:  External ears normal.  Nose normal.  No pharyngeal erythema, swelling or exudate.  Midline uvula.    Eyes:  Conjunctivae clear.  Pupils are equal, round, and reactive.   Neck: Normal range of motion.  Neck supple.   CV: Regular rate and rhythm.  No murmurs.      Respiratory: Effort normal and breath sounds normal.  No wheezing or crackles.   Gastrointestinal: Soft.  No distension. There is LLQ tenderness.  There is no rigidity, no rebound and no guarding.   Musculoskeletal: Normal range of motion.  Non tender extremities to palpations.  Left CVA tenderness.  No step-off or tenderness noted to the cervical, thoracic, lumbar spine.  Neuro: Alert. Moving all extremities appropriately.  Normal speech.  CN II-XII grossly intact.  Gross muscle strength intact of the proximal and distal bilateral upper and lower extremities.  Sensation intact to light touch in all 4 extremities.    Skin: Skin is warm and dry.  No rash noted.     Emergency Department Course     Imaging:  KUB XR   Final Result   IMPRESSION: A stable 0.4 cm stone lower pole left kidney. No ureteral stone. Nonobstructed bowel gas pattern.        Report per radiology    Laboratory:  Labs Ordered and Resulted from Time of ED Arrival to Time of ED Departure   BASIC METABOLIC PANEL - Abnormal       Result Value    Sodium 138      Potassium 3.5      Chloride 103      Carbon Dioxide (CO2) 19 (*)     Anion Gap 16 (*)     Urea Nitrogen 12.7      Creatinine 0.94      GFR Estimate 83      Calcium 9.1      Glucose 139 (*)    ROUTINE UA WITH MICROSCOPIC REFLEX TO CULTURE - Abnormal    Color Urine Light Yellow       Appearance Urine Clear      Glucose Urine Negative      Bilirubin Urine Negative      Ketones Urine 60 (*)     Specific Gravity Urine 1.023      Blood Urine Small (*)     pH Urine 7.5 (*)     Protein Albumin Urine 10 (*)     Urobilinogen Urine 2.0      Nitrite Urine Negative      Leukocyte Esterase Urine Trace (*)     Bacteria Urine Few (*)     Mucus Urine Present (*)     RBC Urine 53 (*)     WBC Urine 3      Squamous Epithelials Urine 3 (*)    CBC WITH PLATELETS AND DIFFERENTIAL - Abnormal    WBC Count 12.6 (*)     RBC Count 3.93      Hemoglobin 12.1      Hematocrit 37.0      MCV 94      MCH 30.8      MCHC 32.7      RDW 12.8      Platelet Count 296      % Neutrophils 82      % Lymphocytes 11      % Monocytes 6      % Eosinophils 0      % Basophils 0      % Immature Granulocytes 1      NRBCs per 100 WBC 0      Absolute Neutrophils 10.5 (*)     Absolute Lymphocytes 1.3      Absolute Monocytes 0.7      Absolute Eosinophils 0.0      Absolute Basophils 0.0      Absolute Immature Granulocytes 0.1      Absolute NRBCs 0.0     HCG QUALITATIVE URINE - Normal    hCG Urine Qualitative Negative          Procedures       Emergency Department Course & Assessments:             Interventions:  Medications   ondansetron (ZOFRAN) injection 4 mg (has no administration in time range)   HYDROmorphone (PF) (DILAUDID) injection 0.5 mg (has no administration in time range)   sodium chloride 0.9% BOLUS 1,000 mL (1,000 mLs Intravenous $New Bag 12/1/23 1439)   ketorolac (TORADOL) injection 15 mg (15 mg Intravenous $Given 12/1/23 1439)   HYDROmorphone (PF) (DILAUDID) injection 0.5 mg (0.5 mg Intravenous $Given 12/1/23 1439)   ondansetron (ZOFRAN) injection 4 mg (4 mg Intravenous $Given 12/1/23 1438)   ondansetron (ZOFRAN) injection 4 mg (4 mg Intravenous $Given 12/1/23 1631)   HYDROmorphone (PF) (DILAUDID) injection 0.5 mg (0.5 mg Intravenous $Given 12/1/23 1720)        Assessments:  1415 I met with patient, obtained history and performed  examination.      Independent Interpretation (X-rays, CTs, rhythm strip):  Reviewed x-ray did not visualize a stone.    Consultations/Discussion of Management or Tests:  ED Course as of 12/01/23 1828   Fri Dec 01, 2023   173 I reassessed the patient, she is feeling improved.  Attempt p.o. challenge.    I reassessed the patient.  She continues to have pain and nausea.  Not able to tolerate p.o.    I spoke with hospitalist Marylou Holley for admission for pain control.       Social Determinants of Health affecting care:  none     Disposition:  The patient was admitted to the hospital under the care of Dr. Marr.     Impression & Plan        Medical Decision Makin-year-old female presents to the ER with ongoing flank pain.  Vitals are stable.  Broad differential was pursued include not limited to infection, dehydration, electrolyte, metabolic, renal dysfunction, etc.  Overall patient is well-appearing nontoxic.  She had a trip and fall.  Regarding the fall there is no obvious signs of trauma on head to toe exam.  No indication for head and imaging.  Did not lose consciousness.  Not on blood thinners.  No other injuries noted.  Labs are consistent with stone.  Mild white count elevation related to pain and vomiting.  No signs of infection.  UA is unrevealing.  Afebrile.  Do not suspect infected stone.  BMP without acute electrolyte, metabolic or renal dysfunction.  Slight anion gap related to vomiting suspect starvation ketoacidosis.  Fluids were given.  Attempted p.o. challenge but patient was unable to tolerate.  Patient will be admitted to the hospitalist for pain control.    Diagnosis:    ICD-10-CM    1. Flank pain  R10.9       2. Ureteral stone  N20.1            Discharge Medications:  New Prescriptions    No medications on file          2023   Shalonda Sharif MD Bennett, Jennifer L, MD  [phone]

## 2023-12-01 NOTE — H&P
[facility]    History and Physical - Hospitalist Service       Date of Admission:  12/1/2023    Assessment & Plan      Hansa Ortiz is a 28 year old female with no significant PMH who presents with left lower abdominal pain, nausea, vomiting.    Was seen in the ED on 11/30 for left lower abdominal pain, vomiting and vaginal discharge.  Suspected that her symptoms were largely due to a renal calculi.  Also had a wet prep that was negative, gonorrhea chlamydia testing negative.  Was discharged home from ED with Flomax, antiemetics, instructions to strain urine and urology follow-up outpatient.     Since discharging home patient has continued to have intermittent left lower abdominal pain that occasionally radiates to the lower back.  Along with intermittent nausea and vomiting.  Unable to tolerate oral intake.  Reports subjective fevers and chills.  No urinary symptoms or hematuria.  Had a loose stool this morning.  Has only urinated twice since yesterday and did not strain her urine at home and no kidney stone noted with urination at the ED today.     Left flank and lower abdominal pain   Left lower pole kidney stone   Left ureteral stone   Suspect symptoms are due to renal colic. Distal left ureteral stone seen at the ureterovesical junction on CT abdomen on 11/30.  XR KUB on 12/1 did not note the ureteral stone however may have not been seen due to being an XR.  UA notable for RBC.  Today's UA also notes trace leukocyte esterase and few bacteria. Also has developed a leukocytosis of 12.6. HcG negative.   Plan:  - urology consult  - Npo midnight  - IVF  ml/hr  - Pain medications PRN  - anti-emetics PRN  - strain urine  - Flomax daily  - also started ceftriaxone as precaution given her subject fevers, slight leukocytosis, UA today now notes few bacteria, trace leukocyte esterase    Mild anion gap metabolic acidosis  - check lactic acid   - repeat labs in AM  - continue IVF    Mechanical  fall  Had a fall trying to rush to the bathroom in the ED on 12/1 due to vomiting. Hit the front of her head on the door. No LOC. Remembers the entire event. No headache or vision changes.   - No signs of head trauma from fall, not on anticoagulation, no indication for imaging at this time  - notify provider of any neurological changes     Vaginal discharge  Noted during ED evaluation on 11/30. Patient did not mention any concerns about discharge during my evaluation  - wet prep negative  - negative for chlamydia and gonorrhea on 11/30          Diet:  Regular, NPO at midnight  DVT Prophylaxis: Ambulate every shift  Best Catheter: Not present  Lines: None     Cardiac Monitoring: None  Code Status:  full code    Clinically Significant Risk Factors Present on Admission        # Hypokalemia: Lowest K = 3.3 mmol/L in last 2 days, will replace as needed                           Disposition Plan possibly tomorrow pending urology recommendations    The patient's care was discussed with the Bedside Nurse, Patient, and ED provider .    DANNA Holley PA-C  Hospitalist Service  [facility]  Securely message with [employer] (more info)  Text page via [employer] Paging/Directory     ______________________________________________________________________    Chief Complaint   Left lower abdominal and flank pain, N/V    History is obtained from the patient    History of Present Illness   Hansa Ortiz is a 28 year old female with no significant PMH who presents with left lower abdominal and flank pain, nausea, vomiting.    Was seen in the ED on 11/30 for left lower abdominal pain, vomiting and vaginal discharge.  Suspected that her symptoms were largely due to a renal calculi.  Also had a wet prep that was negative, gonorrhea chlamydia testing negative.  Was discharged home from ED with Flomax, antiemetics, instructions to strain urine and urology follow-up outpatient.     Since discharging home patient has continued  to have intermittent left lower abdominal pain that occasionally radiates to the lower back.  Along with intermittent nausea and vomiting.  Unable to tolerate oral intake.  Reports subjective fevers and chills.  No urinary symptoms or hematuria.  Had a loose stool this morning.  Has only urinated twice since yesterday and did not strain her urine at home and no kidney stone noted with urination at the ED today. Also had a fall trying to rush to the bathroom in the ED due to vomiting. Hit the front of her head on the door. No LOC. Remembers the entire event. Not on anticoagulation. No headache or vision changes.     In the ED, afebrile, blood pressure 124/86, heart rate 61, respirations 16, oxygen 99% on room air.  BMP notable for CO2 of 19, anion gap 16, glucose 139.  hCG negative.  CBC notable for leukocytosis of 12.6.  UA shows small blood, trace leukocyte esterase, 53 red blood cells, few bacteria.  CT abdomen yesterday shows a 0.3 cm distal left ureteral stone at the ureterovesical junction resulting in minimal obstruction, few small nonobstructing bilateral renal calculi. XR KUB shows table 0.4cm stone lower pole left kidney, no ureteral stone.     Past Medical History    Past Medical History:   Diagnosis Date    Abnormal uterine bleeding     Back pain     [place] in [place] lot in [place].  Has on going mid back pain    OSEI III (cervical intraepithelial neoplasia III) 01/03/2019 8/27/14 ASCUS 9/7/16 ASCUS 10/5/17 ASCUS 11/8/18 HSIL 11/27/18 Colpo ECC OSEI II, bx cervicitis 1/3/19 LEEP OSEI II-III completely excised, ECC neg 1/14/20 NIL pap, neg HPV 5/25/21 NIL pap, +HR HPV (not 16/18). Plan: colposcopy due before 8/25/21    Hx of abnormal cervical Pap smear     Trichomonas infection 01/2020    treated     Past Surgical History   Past Surgical History:   Procedure Laterality Date    LEEP TX, CERVICAL  01/01/2019    OPERATIVE HYSTEROSCOPY WITH MORCELLATOR N/A 9/7/2022    Procedure: OPERATIVE  HYSTEROSCOPY WITH MORCELLATOR (MYOSURE);  Surgeon: Rayna Noel MD;  Location:  OR     Prior to Admission Medications   Prior to Admission Medications   Prescriptions Last Dose Informant Patient Reported? Taking?   acetaminophen (TYLENOL) 325 MG tablet Unknown  No Yes   Sig: Take 2 tablets (650 mg) by mouth every 4 hours as needed for mild pain   cyclobenzaprine (FLEXERIL) 10 MG tablet 11/30/2023  Yes Yes   Sig: Take 10 mg by mouth   etonogestrel-ethinyl estradiol (NUVARING) 0.12-0.015 MG/24HR vaginal ring 12/1/2023  No Yes   Sig: Place 1 each vaginally every 28 days   ibuprofen (ADVIL/MOTRIN) 600 MG tablet 12/1/2023  No Yes   Sig: Take 1 tablet (600 mg) by mouth every 6 hours as needed for moderate pain   ibuprofen (ADVIL/MOTRIN) 600 MG tablet 12/1/2023 at am  No Yes   Sig: Take 1 tablet (600 mg) by mouth every 6 hours as needed for moderate pain   ondansetron (ZOFRAN ODT) 4 MG ODT tab 12/1/2023  No Yes   Sig: Take 1 tablet (4 mg) by mouth every 8 hours as needed for nausea   oxyCODONE (ROXICODONE) 5 MG tablet 12/1/2023 at am  No Yes   Sig: Take 1 tablet (5 mg) by mouth every 6 hours as needed for pain   tamsulosin (FLOMAX) 0.4 MG capsule 11/30/2023  No Yes   Sig: Take 1 capsule (0.4 mg) by mouth daily for 10 doses      Facility-Administered Medications: None        Social History   I have reviewed this patient's social history and updated it with pertinent information if needed.  Social History     Tobacco Use    Smoking status: Never    Smokeless tobacco: Never   Vaping Use    Vaping Use: Some days    Substances: Nicotine, CBD    Devices: Disposable, Pre-filled or refillable cartridge   Substance Use Topics    Alcohol use: Yes     Comment: wine 1-2x/ week    Drug use: Not Currently     Types: Marijuana     Comment: states last used 3 weeks ago (approx. 9/15)     Allergies   No Known Allergies     Physical Exam   Vital Signs: Temp: 97  F (36.1  C) Temp src: Temporal BP: 124/86 Pulse: 80   Resp:  16 SpO2: 99 % O2 Device: None (Room air)    Weight: 0 lbs 0 oz    GENERAL:  Alert, uncomfortable, No acute distress. Laying in bed. Vomiting.  PSYCH: pleasant, oriented.  HEART:  Normal S1, S2 with no murmur, RRR  LUNGS:  Normal Respiratory effort. Clear to auscultation bilaterally with no wheezing, rales or ronchi.  ABDOMEN:  Soft, non-tender, non distended. No peritoneal signs. No CVA tenderness.  EXTREMITIES:  No pedal edema, No cyanosis.   SKIN:  Warm, dry to touch  NEUROLOGIC: Speech clear, alert & orientated x 4, no focal deficits.     Medical Decision Making       MANAGEMENT DISCUSSED with the following over the past 24 hours: patient, ED provider, nurse   NOTE(S)/MEDICAL RECORDS REVIEWED over the past 24 hours: labs, imaging, progress notes       Data     I have personally reviewed the following data over the past 24 hrs:    12.6 (H)  \   12.1   / 296     138 103 12.7 /  139 (H)   3.5 19 (L) 0.94 \       Imaging results reviewed over the past 24 hrs:   Recent Results (from the past 24 hour(s))   KUB XR    Narrative    EXAM: XR KUB  LOCATION: [facility]  DATE: 12/1/2023    INDICATION: check kidney stones  COMPARISON: CT 11/30/2023      Impression    IMPRESSION: A stable 0.4 cm stone lower pole left kidney. No ureteral stone. Nonobstructed bowel gas pattern.

## 2023-12-01 NOTE — TELEPHONE ENCOUNTER
FNA reached out to [place] [phone] and EMS dispatched to address on file.    Keira Galeano RN/[place] Nurse Advisor

## 2023-12-01 NOTE — ED NOTES
[facility]  ED Nurse Handoff Report    ED Chief complaint: Flank Pain  . ED Diagnosis:   Final diagnoses:   None       Allergies: No Known Allergies    Code Status: Full Code    Activity level - Baseline/Home:  independent.  Activity Level - Current:   assist of 1.   Lift room needed: No.   Bariatric: No   Needed: No   Isolation: No.   Infection: Not Applicable.     Respiratory status: Room air    Vital Signs (within 30 minutes):   Vitals:    12/01/23 1150 12/01/23 1155   BP: 135/51    Pulse: 80    Resp: 16    Temp:  97  F (36.1  C)   TempSrc:  Temporal   SpO2: 100%        Cardiac Rhythm:  ,      Pain level:    Patient confused: No.   Patient Falls Risk: arm band in place.   Elimination Status: Has voided     Patient Report - Initial Complaint pt here for kidney stones , came back by ambulance at triage laying on floor and on floor in bathroom brought back to room pt vomited 1 time.  Focused Assessment: here for pain meds     Abnormal Results:   Labs Ordered and Resulted from Time of ED Arrival to Time of ED Departure   CBC WITH PLATELETS AND DIFFERENTIAL - Abnormal       Result Value    WBC Count 12.6 (*)     RBC Count 3.93      Hemoglobin 12.1      Hematocrit 37.0      MCV 94      MCH 30.8      MCHC 32.7      RDW 12.8      Platelet Count 296      % Neutrophils 82      % Lymphocytes 11      % Monocytes 6      % Eosinophils 0      % Basophils 0      % Immature Granulocytes 1      NRBCs per 100 WBC 0      Absolute Neutrophils 10.5 (*)     Absolute Lymphocytes 1.3      Absolute Monocytes 0.7      Absolute Eosinophils 0.0      Absolute Basophils 0.0      Absolute Immature Granulocytes 0.1      Absolute NRBCs 0.0     BASIC METABOLIC PANEL   ROUTINE UA WITH MICROSCOPIC REFLEX TO CULTURE   HCG QUALITATIVE URINE        KUB XR    (Results Pending)       Treatments provided:meds  Family Comments: pt will do  OBS brochure/video discussed/provided to patient:  Yes  ED Medications:    Medications   sodium chloride 0.9% BOLUS 1,000 mL (1,000 mLs Intravenous $New Bag 12/1/23 1438)   ketorolac (TORADOL) injection 15 mg (15 mg Intravenous $Given 12/1/23 1439)   HYDROmorphone (PF) (DILAUDID) injection 0.5 mg (0.5 mg Intravenous $Given 12/1/23 1439)   ondansetron (ZOFRAN) injection 4 mg (4 mg Intravenous $Given 12/1/23 1438)       Drips infusing:  No  For the majority of the shift this patient was Green.   Interventions performed were none.    Sepsis treatment initiated: No    Cares/treatment/interventions/medications to be completed following ED care: home    ED Nurse Name: Britt Holley RN  3:06 PM   RECEIVING UNIT ED HANDOFF REVIEW    Above ED Nurse Handoff Report was reviewed: Yes  Reviewed by: Padma Blanchard RN on December 1, 2023 at 7:32 PM

## 2023-12-01 NOTE — TELEPHONE ENCOUNTER
"Patient calling.    Situation: Severe pain, N/V.    Background: Patient was in ER yesterday for kidney stones. She was discharged with zofran, oxycodone, ibuprofren, and tamsulosin.    Assessment: Patient states that she is currently on the floor, and haven't been able to do anything because of excruciating pain in her left lower abdomen. She rates her pain \"my age\" on a scale of 1-10. She also states that she is unable to keep down any food/fluids or medications. The last time she was able to keep down her pain medication was last night. She states that she is \"woozy\" and her skin feels cold and clammy.     Recommendation: Stayed on the line with patient and had another triager call 911. Patient vomited twice while on the phone. Remained on the line with patient until EMS arrived and took over care.    Sarah Jackson RN on 12/1/2023 at 11:00 AM     Reason for Disposition   Shock suspected (e.g., cold/pale/clammy skin, too weak to stand, low BP, rapid pulse)    Additional Information   Negative: Passed out (i.e., fainted, collapsed and was not responding)    Protocols used: Abdominal Pain - Female-A-[place]    "

## 2023-12-01 NOTE — ED NOTES
Pt pulled emergency cord in the bathroom and was seen laying on the bathroom floor.  Pt states that she hit her head on the bathroom door frame on her left forehead when she was attempting to get to the toilet to throw up.  No evidence of impact noted at the time of this note, no redness, no swelling, no open areas.  Pt was holding a blue throw up bag when she was found in the bathroom but stated she also needed to urinate.  Pt denies any LOC and was able to get back into a wheelchair.  Pt states it's all because of her flank pain and she can't stand it anymore.  Pt is alert and oriented X4, ABCs are intact.  Wheel chair was moved in front of the nurses station, she is next on the list for a room in the main ED.

## 2023-12-01 NOTE — PHARMACY-ADMISSION MEDICATION HISTORY
Pharmacist Admission Medication History    Admission medication history is complete. The information provided in this note is only as accurate as the sources available at the time of the update.    Information Source(s): Patient via in-person    Pertinent Information:      Changes made to PTA medication list:  Added: None  Deleted: None  Changed: None    Medication Affordability:  Not including over the counter (OTC) medications, was there a time in the past 3 months when you did not take your medications as prescribed because of cost?: No    Allergies reviewed with patient and updates made in EHR: yes    Medication History Completed By: Libby Demarco AMRITA 12/1/2023 2:42 PM    Prior to Admission medications    Medication Sig Last Dose Taking? Auth Provider Long Term End Date   acetaminophen (TYLENOL) 325 MG tablet Take 2 tablets (650 mg) by mouth every 4 hours as needed for mild pain Unknown Yes Rayna Noel MD     cyclobenzaprine (FLEXERIL) 10 MG tablet Take 10 mg by mouth 11/30/2023 Yes Reported, Patient     etonogestrel-ethinyl estradiol (NUVARING) 0.12-0.015 MG/24HR vaginal ring Place 1 each vaginally every 28 days 12/1/2023 Yes Rayna Noel MD Yes    ibuprofen (ADVIL/MOTRIN) 600 MG tablet Take 1 tablet (600 mg) by mouth every 6 hours as needed for moderate pain 12/1/2023 at am Yes Jaja Romero, DO  12/30/23   ibuprofen (ADVIL/MOTRIN) 600 MG tablet Take 1 tablet (600 mg) by mouth every 6 hours as needed for moderate pain 12/1/2023 Yes Rayna Noel MD     ondansetron (ZOFRAN ODT) 4 MG ODT tab Take 1 tablet (4 mg) by mouth every 8 hours as needed for nausea 12/1/2023 Yes Jaja Romero DO  12/3/23   oxyCODONE (ROXICODONE) 5 MG tablet Take 1 tablet (5 mg) by mouth every 6 hours as needed for pain 12/1/2023 at am Yes Jaja Romero, DO  12/3/23   tamsulosin (FLOMAX) 0.4 MG capsule Take 1 capsule (0.4 mg) by mouth daily for 10 doses 11/30/2023 Yes  Jaja Romero, DO  12/10/23

## 2023-12-01 NOTE — ED TRIAGE NOTES
Pt was just discharged from the ED yesterday with a diagnosis of kidney stones.  Pt BIBA today for increased left flank pain and n/v.  Pt was given toradol and zofran in the ambulance with minimal results as she is still in 10/10 pain and vomiting in triage.  Pt states the pain is unbearable and is currently laying on the triage floor.     Triage Assessment (Adult)       Row Name 12/01/23 1152          Triage Assessment    Airway WDL WDL        Respiratory WDL    Respiratory WDL WDL        Skin Circulation/Temperature WDL    Skin Circulation/Temperature WDL WDL        Cardiac WDL    Cardiac WDL WDL        Peripheral/Neurovascular WDL    Peripheral Neurovascular WDL WDL        Cognitive/Neuro/Behavioral WDL    Cognitive/Neuro/Behavioral WDL WDL

## 2023-12-02 ENCOUNTER — ANESTHESIA EVENT (OUTPATIENT)
Dept: SURGERY | Facility: CLINIC | Age: 28
End: 2023-12-02
Payer: COMMERCIAL

## 2023-12-02 ENCOUNTER — ANESTHESIA (OUTPATIENT)
Dept: SURGERY | Facility: CLINIC | Age: 28
End: 2023-12-02
Payer: COMMERCIAL

## 2023-12-02 ENCOUNTER — APPOINTMENT (OUTPATIENT)
Dept: GENERAL RADIOLOGY | Facility: CLINIC | Age: 28
End: 2023-12-02
Attending: UROLOGY
Payer: COMMERCIAL

## 2023-12-02 VITALS
WEIGHT: 146.6 LBS | HEART RATE: 84 BPM | BODY MASS INDEX: 25.97 KG/M2 | SYSTOLIC BLOOD PRESSURE: 125 MMHG | OXYGEN SATURATION: 100 % | TEMPERATURE: 97.5 F | DIASTOLIC BLOOD PRESSURE: 80 MMHG | RESPIRATION RATE: 16 BRPM

## 2023-12-02 LAB
ANION GAP SERPL CALCULATED.3IONS-SCNC: 11 MMOL/L (ref 7–15)
BUN SERPL-MCNC: 12.3 MG/DL (ref 6–20)
CALCIUM SERPL-MCNC: 8.7 MG/DL (ref 8.6–10)
CHLORIDE SERPL-SCNC: 104 MMOL/L (ref 98–107)
CREAT SERPL-MCNC: 1.12 MG/DL (ref 0.51–0.95)
DEPRECATED HCO3 PLAS-SCNC: 22 MMOL/L (ref 22–29)
EGFRCR SERPLBLD CKD-EPI 2021: 68 ML/MIN/1.73M2
ERYTHROCYTE [DISTWIDTH] IN BLOOD BY AUTOMATED COUNT: 13.2 % (ref 10–15)
GLUCOSE SERPL-MCNC: 98 MG/DL (ref 70–99)
HCT VFR BLD AUTO: 32.5 % (ref 35–47)
HGB BLD-MCNC: 11 G/DL (ref 11.7–15.7)
MCH RBC QN AUTO: 31.3 PG (ref 26.5–33)
MCHC RBC AUTO-ENTMCNC: 33.8 G/DL (ref 31.5–36.5)
MCV RBC AUTO: 93 FL (ref 78–100)
PLATELET # BLD AUTO: 270 10E3/UL (ref 150–450)
POTASSIUM SERPL-SCNC: 3.8 MMOL/L (ref 3.4–5.3)
RBC # BLD AUTO: 3.51 10E6/UL (ref 3.8–5.2)
SODIUM SERPL-SCNC: 137 MMOL/L (ref 135–145)
WBC # BLD AUTO: 10.8 10E3/UL (ref 4–11)

## 2023-12-02 PROCEDURE — 999N000179 XR SURGERY CARM FLUORO LESS THAN 5 MIN W STILLS: Mod: TC

## 2023-12-02 PROCEDURE — 82365 CALCULUS SPECTROSCOPY: CPT | Performed by: UROLOGY

## 2023-12-02 PROCEDURE — 85027 COMPLETE CBC AUTOMATED: CPT

## 2023-12-02 PROCEDURE — 250N000013 HC RX MED GY IP 250 OP 250 PS 637

## 2023-12-02 PROCEDURE — 360N000077 HC SURGERY LEVEL 4, PER MIN: Performed by: UROLOGY

## 2023-12-02 PROCEDURE — 250N000011 HC RX IP 250 OP 636: Performed by: UROLOGY

## 2023-12-02 PROCEDURE — C1769 GUIDE WIRE: HCPCS | Performed by: UROLOGY

## 2023-12-02 PROCEDURE — 99204 OFFICE O/P NEW MOD 45 MIN: CPT | Mod: 25 | Performed by: UROLOGY

## 2023-12-02 PROCEDURE — 80048 BASIC METABOLIC PNL TOTAL CA: CPT

## 2023-12-02 PROCEDURE — 74420 UROGRAPHY RTRGR +-KUB: CPT | Mod: 26 | Performed by: UROLOGY

## 2023-12-02 PROCEDURE — 250N000009 HC RX 250: Performed by: ANESTHESIOLOGY

## 2023-12-02 PROCEDURE — C1758 CATHETER, URETERAL: HCPCS | Performed by: UROLOGY

## 2023-12-02 PROCEDURE — 999N000141 HC STATISTIC PRE-PROCEDURE NURSING ASSESSMENT: Performed by: UROLOGY

## 2023-12-02 PROCEDURE — 36415 COLL VENOUS BLD VENIPUNCTURE: CPT

## 2023-12-02 PROCEDURE — 272N000001 HC OR GENERAL SUPPLY STERILE: Performed by: UROLOGY

## 2023-12-02 PROCEDURE — 370N000017 HC ANESTHESIA TECHNICAL FEE, PER MIN: Performed by: UROLOGY

## 2023-12-02 PROCEDURE — 258N000003 HC RX IP 258 OP 636: Performed by: ANESTHESIOLOGY

## 2023-12-02 PROCEDURE — C2617 STENT, NON-COR, TEM W/O DEL: HCPCS | Performed by: UROLOGY

## 2023-12-02 PROCEDURE — 99238 HOSP IP/OBS DSCHRG MGMT 30/<: CPT | Performed by: INTERNAL MEDICINE

## 2023-12-02 PROCEDURE — 250N000011 HC RX IP 250 OP 636: Mod: JZ | Performed by: ANESTHESIOLOGY

## 2023-12-02 PROCEDURE — 250N000009 HC RX 250: Performed by: UROLOGY

## 2023-12-02 PROCEDURE — 258N000003 HC RX IP 258 OP 636

## 2023-12-02 PROCEDURE — C1894 INTRO/SHEATH, NON-LASER: HCPCS | Performed by: UROLOGY

## 2023-12-02 PROCEDURE — 250N000025 HC SEVOFLURANE, PER MIN: Performed by: UROLOGY

## 2023-12-02 PROCEDURE — 710N000012 HC RECOVERY PHASE 2, PER MINUTE: Performed by: UROLOGY

## 2023-12-02 PROCEDURE — 710N000009 HC RECOVERY PHASE 1, LEVEL 1, PER MIN: Performed by: UROLOGY

## 2023-12-02 PROCEDURE — 250N000011 HC RX IP 250 OP 636: Mod: JZ

## 2023-12-02 PROCEDURE — G0378 HOSPITAL OBSERVATION PER HR: HCPCS

## 2023-12-02 PROCEDURE — 272N000002 HC OR SUPPLY OTHER OPNP: Performed by: UROLOGY

## 2023-12-02 PROCEDURE — 52356 CYSTO/URETERO W/LITHOTRIPSY: CPT | Mod: LT | Performed by: UROLOGY

## 2023-12-02 DEVICE — STENT URETERAL POLARIS ULTRA 6FRX24CM M0061921320: Type: IMPLANTABLE DEVICE | Site: URETER | Status: FUNCTIONAL

## 2023-12-02 RX ORDER — SODIUM CHLORIDE, SODIUM LACTATE, POTASSIUM CHLORIDE, CALCIUM CHLORIDE 600; 310; 30; 20 MG/100ML; MG/100ML; MG/100ML; MG/100ML
INJECTION, SOLUTION INTRAVENOUS CONTINUOUS
Status: DISCONTINUED | OUTPATIENT
Start: 2023-12-02 | End: 2023-12-02

## 2023-12-02 RX ORDER — HYDROMORPHONE HCL IN WATER/PF 6 MG/30 ML
0.2 PATIENT CONTROLLED ANALGESIA SYRINGE INTRAVENOUS EVERY 5 MIN PRN
Status: DISCONTINUED | OUTPATIENT
Start: 2023-12-02 | End: 2023-12-02

## 2023-12-02 RX ORDER — ONDANSETRON 2 MG/ML
INJECTION INTRAMUSCULAR; INTRAVENOUS PRN
Status: DISCONTINUED | OUTPATIENT
Start: 2023-12-02 | End: 2023-12-02

## 2023-12-02 RX ORDER — ONDANSETRON 4 MG/1
4 TABLET, ORALLY DISINTEGRATING ORAL EVERY 30 MIN PRN
Status: DISCONTINUED | OUTPATIENT
Start: 2023-12-02 | End: 2023-12-02

## 2023-12-02 RX ORDER — OXYBUTYNIN CHLORIDE 5 MG/1
5 TABLET, EXTENDED RELEASE ORAL DAILY
Qty: 10 TABLET | Refills: 0 | Status: SHIPPED | OUTPATIENT
Start: 2023-12-02 | End: 2023-12-12

## 2023-12-02 RX ORDER — LIDOCAINE HYDROCHLORIDE 20 MG/ML
INJECTION, SOLUTION INFILTRATION; PERINEURAL PRN
Status: DISCONTINUED | OUTPATIENT
Start: 2023-12-02 | End: 2023-12-02

## 2023-12-02 RX ORDER — LIDOCAINE 40 MG/G
CREAM TOPICAL
Status: DISCONTINUED | OUTPATIENT
Start: 2023-12-02 | End: 2023-12-02 | Stop reason: HOSPADM

## 2023-12-02 RX ORDER — ONDANSETRON 2 MG/ML
4 INJECTION INTRAMUSCULAR; INTRAVENOUS EVERY 30 MIN PRN
Status: DISCONTINUED | OUTPATIENT
Start: 2023-12-02 | End: 2023-12-02

## 2023-12-02 RX ORDER — CEFAZOLIN SODIUM/WATER 2 G/20 ML
2 SYRINGE (ML) INTRAVENOUS SEE ADMIN INSTRUCTIONS
Status: DISCONTINUED | OUTPATIENT
Start: 2023-12-02 | End: 2023-12-02 | Stop reason: HOSPADM

## 2023-12-02 RX ORDER — LABETALOL HYDROCHLORIDE 5 MG/ML
10 INJECTION, SOLUTION INTRAVENOUS
Status: DISCONTINUED | OUTPATIENT
Start: 2023-12-02 | End: 2023-12-02 | Stop reason: HOSPADM

## 2023-12-02 RX ORDER — SODIUM CHLORIDE, SODIUM LACTATE, POTASSIUM CHLORIDE, CALCIUM CHLORIDE 600; 310; 30; 20 MG/100ML; MG/100ML; MG/100ML; MG/100ML
INJECTION, SOLUTION INTRAVENOUS CONTINUOUS
Status: DISCONTINUED | OUTPATIENT
Start: 2023-12-02 | End: 2023-12-02 | Stop reason: HOSPADM

## 2023-12-02 RX ORDER — FENTANYL CITRATE 50 UG/ML
50 INJECTION, SOLUTION INTRAMUSCULAR; INTRAVENOUS EVERY 5 MIN PRN
Status: DISCONTINUED | OUTPATIENT
Start: 2023-12-02 | End: 2023-12-02

## 2023-12-02 RX ORDER — SCOLOPAMINE TRANSDERMAL SYSTEM 1 MG/1
1 PATCH, EXTENDED RELEASE TRANSDERMAL
Status: DISCONTINUED | OUTPATIENT
Start: 2023-12-02 | End: 2023-12-02 | Stop reason: HOSPADM

## 2023-12-02 RX ORDER — FENTANYL CITRATE 50 UG/ML
50 INJECTION, SOLUTION INTRAMUSCULAR; INTRAVENOUS EVERY 5 MIN PRN
Status: DISCONTINUED | OUTPATIENT
Start: 2023-12-02 | End: 2023-12-02 | Stop reason: HOSPADM

## 2023-12-02 RX ORDER — OXYCODONE HYDROCHLORIDE 5 MG/1
5 TABLET ORAL EVERY 6 HOURS PRN
Qty: 9 TABLET | Refills: 0 | Status: SHIPPED | OUTPATIENT
Start: 2023-12-02 | End: 2023-12-05

## 2023-12-02 RX ORDER — KETOROLAC TROMETHAMINE 10 MG/1
10 TABLET, FILM COATED ORAL EVERY 6 HOURS
Qty: 20 TABLET | Refills: 0 | Status: SHIPPED | OUTPATIENT
Start: 2023-12-02 | End: 2023-12-07

## 2023-12-02 RX ORDER — HYDROMORPHONE HCL IN WATER/PF 6 MG/30 ML
0.4 PATIENT CONTROLLED ANALGESIA SYRINGE INTRAVENOUS EVERY 5 MIN PRN
Status: DISCONTINUED | OUTPATIENT
Start: 2023-12-02 | End: 2023-12-02

## 2023-12-02 RX ORDER — FENTANYL CITRATE 50 UG/ML
25 INJECTION, SOLUTION INTRAMUSCULAR; INTRAVENOUS EVERY 5 MIN PRN
Status: DISCONTINUED | OUTPATIENT
Start: 2023-12-02 | End: 2023-12-02

## 2023-12-02 RX ORDER — TAMSULOSIN HYDROCHLORIDE 0.4 MG/1
0.4 CAPSULE ORAL DAILY
Qty: 10 CAPSULE | Refills: 0 | Status: SHIPPED | OUTPATIENT
Start: 2023-12-02 | End: 2024-03-21

## 2023-12-02 RX ORDER — ONDANSETRON 4 MG/1
4 TABLET, ORALLY DISINTEGRATING ORAL EVERY 30 MIN PRN
Status: DISCONTINUED | OUTPATIENT
Start: 2023-12-02 | End: 2023-12-02 | Stop reason: HOSPADM

## 2023-12-02 RX ORDER — HYDROMORPHONE HCL IN WATER/PF 6 MG/30 ML
0.4 PATIENT CONTROLLED ANALGESIA SYRINGE INTRAVENOUS EVERY 5 MIN PRN
Status: DISCONTINUED | OUTPATIENT
Start: 2023-12-02 | End: 2023-12-02 | Stop reason: HOSPADM

## 2023-12-02 RX ORDER — DEXAMETHASONE SODIUM PHOSPHATE 4 MG/ML
INJECTION, SOLUTION INTRA-ARTICULAR; INTRALESIONAL; INTRAMUSCULAR; INTRAVENOUS; SOFT TISSUE PRN
Status: DISCONTINUED | OUTPATIENT
Start: 2023-12-02 | End: 2023-12-02

## 2023-12-02 RX ORDER — PROPOFOL 10 MG/ML
INJECTION, EMULSION INTRAVENOUS PRN
Status: DISCONTINUED | OUTPATIENT
Start: 2023-12-02 | End: 2023-12-02

## 2023-12-02 RX ORDER — PROPOFOL 10 MG/ML
INJECTION, EMULSION INTRAVENOUS CONTINUOUS PRN
Status: DISCONTINUED | OUTPATIENT
Start: 2023-12-02 | End: 2023-12-02

## 2023-12-02 RX ORDER — KETOROLAC TROMETHAMINE 30 MG/ML
INJECTION, SOLUTION INTRAMUSCULAR; INTRAVENOUS PRN
Status: DISCONTINUED | OUTPATIENT
Start: 2023-12-02 | End: 2023-12-02

## 2023-12-02 RX ORDER — ASPIRIN 81 MG
100 TABLET, DELAYED RELEASE (ENTERIC COATED) ORAL DAILY
Qty: 60 TABLET | Refills: 1 | Status: SHIPPED | OUTPATIENT
Start: 2023-12-02

## 2023-12-02 RX ORDER — CEFAZOLIN SODIUM/WATER 2 G/20 ML
2 SYRINGE (ML) INTRAVENOUS
Status: DISCONTINUED | OUTPATIENT
Start: 2023-12-02 | End: 2023-12-02 | Stop reason: HOSPADM

## 2023-12-02 RX ORDER — FUROSEMIDE 10 MG/ML
INJECTION INTRAMUSCULAR; INTRAVENOUS PRN
Status: DISCONTINUED | OUTPATIENT
Start: 2023-12-02 | End: 2023-12-02

## 2023-12-02 RX ORDER — ONDANSETRON 2 MG/ML
4 INJECTION INTRAMUSCULAR; INTRAVENOUS EVERY 30 MIN PRN
Status: DISCONTINUED | OUTPATIENT
Start: 2023-12-02 | End: 2023-12-02 | Stop reason: HOSPADM

## 2023-12-02 RX ORDER — FENTANYL CITRATE 50 UG/ML
INJECTION, SOLUTION INTRAMUSCULAR; INTRAVENOUS PRN
Status: DISCONTINUED | OUTPATIENT
Start: 2023-12-02 | End: 2023-12-02

## 2023-12-02 RX ORDER — LABETALOL HYDROCHLORIDE 5 MG/ML
10 INJECTION, SOLUTION INTRAVENOUS
Status: DISCONTINUED | OUTPATIENT
Start: 2023-12-02 | End: 2023-12-02

## 2023-12-02 RX ORDER — SODIUM CHLORIDE, SODIUM LACTATE, POTASSIUM CHLORIDE, CALCIUM CHLORIDE 600; 310; 30; 20 MG/100ML; MG/100ML; MG/100ML; MG/100ML
INJECTION, SOLUTION INTRAVENOUS CONTINUOUS PRN
Status: DISCONTINUED | OUTPATIENT
Start: 2023-12-02 | End: 2023-12-02

## 2023-12-02 RX ORDER — HYDROMORPHONE HCL IN WATER/PF 6 MG/30 ML
0.2 PATIENT CONTROLLED ANALGESIA SYRINGE INTRAVENOUS EVERY 5 MIN PRN
Status: DISCONTINUED | OUTPATIENT
Start: 2023-12-02 | End: 2023-12-02 | Stop reason: HOSPADM

## 2023-12-02 RX ORDER — FENTANYL CITRATE 50 UG/ML
25 INJECTION, SOLUTION INTRAMUSCULAR; INTRAVENOUS EVERY 5 MIN PRN
Status: DISCONTINUED | OUTPATIENT
Start: 2023-12-02 | End: 2023-12-02 | Stop reason: HOSPADM

## 2023-12-02 RX ORDER — CIPROFLOXACIN 500 MG/1
500 TABLET, FILM COATED ORAL ONCE
Qty: 1 TABLET | Refills: 0 | Status: SHIPPED | OUTPATIENT
Start: 2023-12-02 | End: 2023-12-02

## 2023-12-02 RX ADMIN — SODIUM CHLORIDE, POTASSIUM CHLORIDE, SODIUM LACTATE AND CALCIUM CHLORIDE: 600; 310; 30; 20 INJECTION, SOLUTION INTRAVENOUS at 11:26

## 2023-12-02 RX ADMIN — PROPOFOL 50 MCG/KG/MIN: 10 INJECTION, EMULSION INTRAVENOUS at 11:13

## 2023-12-02 RX ADMIN — PHENYLEPHRINE HYDROCHLORIDE 100 MCG: 10 INJECTION INTRAVENOUS at 11:25

## 2023-12-02 RX ADMIN — KETOROLAC TROMETHAMINE 30 MG: 30 INJECTION, SOLUTION INTRAMUSCULAR at 11:48

## 2023-12-02 RX ADMIN — PROPOFOL 180 MG: 10 INJECTION, EMULSION INTRAVENOUS at 11:08

## 2023-12-02 RX ADMIN — TAMSULOSIN HYDROCHLORIDE 0.4 MG: 0.4 CAPSULE ORAL at 09:28

## 2023-12-02 RX ADMIN — FENTANYL CITRATE 100 MCG: 50 INJECTION INTRAMUSCULAR; INTRAVENOUS at 11:08

## 2023-12-02 RX ADMIN — OXYCODONE HYDROCHLORIDE 5 MG: 5 TABLET ORAL at 08:02

## 2023-12-02 RX ADMIN — FUROSEMIDE 10 MG: 10 INJECTION, SOLUTION INTRAVENOUS at 11:48

## 2023-12-02 RX ADMIN — PHENYLEPHRINE HYDROCHLORIDE 100 MCG: 10 INJECTION INTRAVENOUS at 11:36

## 2023-12-02 RX ADMIN — DEXAMETHASONE SODIUM PHOSPHATE 8 MG: 4 INJECTION, SOLUTION INTRA-ARTICULAR; INTRALESIONAL; INTRAMUSCULAR; INTRAVENOUS; SOFT TISSUE at 11:08

## 2023-12-02 RX ADMIN — ACETAMINOPHEN 975 MG: 325 TABLET, FILM COATED ORAL at 09:27

## 2023-12-02 RX ADMIN — SODIUM CHLORIDE, POTASSIUM CHLORIDE, SODIUM LACTATE AND CALCIUM CHLORIDE: 600; 310; 30; 20 INJECTION, SOLUTION INTRAVENOUS at 11:00

## 2023-12-02 RX ADMIN — CEFTRIAXONE 2 G: 1 INJECTION, POWDER, FOR SOLUTION INTRAMUSCULAR; INTRAVENOUS at 11:03

## 2023-12-02 RX ADMIN — MIDAZOLAM 2 MG: 1 INJECTION INTRAMUSCULAR; INTRAVENOUS at 11:05

## 2023-12-02 RX ADMIN — LIDOCAINE HYDROCHLORIDE 50 MG: 20 INJECTION, SOLUTION INFILTRATION; PERINEURAL at 11:08

## 2023-12-02 RX ADMIN — CYCLOBENZAPRINE 10 MG: 10 TABLET, FILM COATED ORAL at 09:27

## 2023-12-02 RX ADMIN — ONDANSETRON 4 MG: 2 INJECTION INTRAMUSCULAR; INTRAVENOUS at 11:48

## 2023-12-02 RX ADMIN — SODIUM CHLORIDE, POTASSIUM CHLORIDE, SODIUM LACTATE AND CALCIUM CHLORIDE: 600; 310; 30; 20 INJECTION, SOLUTION INTRAVENOUS at 06:26

## 2023-12-02 ASSESSMENT — ACTIVITIES OF DAILY LIVING (ADL)
ADLS_ACUITY_SCORE: 33

## 2023-12-02 NOTE — PROGRESS NOTES
PRIMARY DIAGNOSIS: FLANK PAIN  OUTPATIENT/OBSERVATION GOALS TO BE MET BEFORE DISCHARGE:  1. Pain Status: Improved-controlled with oral pain medications. Oxy 5mg and Toradol given Pt tolerating.    2. Return to near baseline physical activity: No Ax1 with gaitbelt. Pt had a recent fall @ ED when she was trying to get to bathroom to vomit.     3. Cleared for discharge by consultants (if involved): No    Discharge Planner Nurse   Safe discharge environment identified: No  Barriers to discharge: Yes       Entered by: Padma Blanchard RN 12/02/2023 3:46 AM   /70 (BP Location: Left arm)   Pulse 56   Temp 98.9  F (37.2  C) (Oral)   Resp 18   Wt 66.5 kg (146 lb 9.6 oz)   LMP 10/23/2023   SpO2 96%   BMI 25.97 kg/m      Please review provider order for any additional goals.   Nurse to notify provider when observation goals have been met and patient is ready for discharge.

## 2023-12-02 NOTE — DISCHARGE SUMMARY
[facility]  Hospitalist Discharge Summary      Date of Admission:  12/1/2023  Date of Discharge:  12/2/2023  Discharging Provider: Hemal Bills DO  Discharge Service: Hospitalist Service    Discharge Diagnoses   Ureterolithiasis.  Acute kidney injury.        Follow-ups Needed After Discharge   Follow-up with urology as scheduled by their office.    Discharge Disposition   Discharged to home  Condition at discharge: Stable    Hospital Course   Hansa Ortiz is a 28 year old female with no significant PMH who presents with left lower abdominal pain, nausea, vomiting.  Found to have ureterolithiasis.  Started on continuous IV fluids.  Seen in consultation by urology.  Taken to the operating room for cystoscopy with left ureteral stent placement.  Able to discharge home from postanesthesia care unit by urology.  Follow-up with urology as scheduled.    Consultations This Hospital Stay   UROLOGY IP CONSULT    Code Status   Full Code    Time Spent on this Encounter   I spent 25 minutes with Ms. Ortiz and working on discharge on 12/2/2023.       Hemal Bills DO  [facility]  [address]  [facility] [address]  Phone: [phone]  Fax: [phone]  ______________________________________________________________________    Physical Exam   Vital Signs: Temp: 97.5  F (36.4  C) Temp src: Temporal BP: 125/80 Pulse: 84   Resp: 16 SpO2: 100 % O2 Device: None (Room air) Oxygen Delivery: 5 LPM  Weight: 146 lbs 9.6 oz  Gen:  NAD, A&Ox3.  Eyes:  PERRL, sclera anicteric.  OP:  MMM, no lesions.  Neck:  Supple.  CV:  Regular, no murmurs.  Lung:  CTA b/l, normal effort.  Ab:  +BS, soft.  Skin:  Warm, dry to touch.  No rash.  Ext:  No pitting edema LE b/l.         Primary Care Physician   [facility]    Discharge Orders   No discharge procedures on file.      Discharge Medications   Discharge Medication List as of 12/2/2023 12:46 PM         START taking these medications    Details   ciprofloxacin (CIPRO) 500 MG tablet Take 1 tablet (500 mg) by mouth once for 1 dose Bring with you to take at the time of ureteral stent removal., Disp-1 tablet, R-0, E-Prescribe      docusate sodium (COLACE) 100 MG tablet Take 1 tablet (100 mg) by mouth daily, Disp-60 tablet, R-1, E-Prescribe      ketorolac (TORADOL) 10 MG tablet Take 1 tablet (10 mg) by mouth every 6 hours for 5 days, Disp-20 tablet, R-0, E-Prescribe      oxyBUTYnin ER (DITROPAN XL) 5 MG 24 hr tablet Take 1 tablet (5 mg) by mouth daily for 10 days Take daily until your stent is removed., Disp-10 tablet, R-0, E-Prescribe      !! oxyCODONE (ROXICODONE) 5 MG tablet Take 1 tablet (5 mg) by mouth every 6 hours as needed for pain, Disp-9 tablet, R-0, E-Prescribe      !! tamsulosin (FLOMAX) 0.4 MG capsule Take 1 capsule (0.4 mg) by mouth daily Take daily until your stent is removed., Disp-10 capsule, R-0, E-Prescribe       !! - Potential duplicate medications found. Please discuss with provider.        CONTINUE these medications which have NOT CHANGED    Details   acetaminophen (TYLENOL) 325 MG tablet Take 2 tablets (650 mg) by mouth every 4 hours as needed for mild pain, Disp-60 tablet, R-0, E-Prescribe      cyclobenzaprine (FLEXERIL) 10 MG tablet Take 10 mg by mouth, Historical      etonogestrel-ethinyl estradiol (NUVARING) 0.12-0.015 MG/24HR vaginal ring Place 1 each vaginally every 28 days, Disp-3 each, R-3, E-Prescribe      !! ibuprofen (ADVIL/MOTRIN) 600 MG tablet Take 1 tablet (600 mg) by mouth every 6 hours as needed for moderate pain, Disp-20 tablet, R-0, Local Print      !! ibuprofen (ADVIL/MOTRIN) 600 MG tablet Take 1 tablet (600 mg) by mouth every 6 hours as needed for moderate pain, Disp-30 tablet, R-0, E-Prescribe      ondansetron (ZOFRAN ODT) 4 MG ODT tab Take 1 tablet (4 mg) by mouth every 8 hours as needed for nausea, Disp-10 tablet, R-0, Local Print      !! oxyCODONE (ROXICODONE) 5 MG  tablet Take 1 tablet (5 mg) by mouth every 6 hours as needed for pain, Disp-12 tablet, R-0, Local Print      !! tamsulosin (FLOMAX) 0.4 MG capsule Take 1 capsule (0.4 mg) by mouth daily for 10 doses, Disp-10 capsule, R-0, Local Print       !! - Potential duplicate medications found. Please discuss with provider.        Allergies   No Known Allergies

## 2023-12-02 NOTE — CONSULTS
Urology Consult History and Physical    Name: Hansa Ortiz    MRN: [id number]   YOB: 1995       We were asked to see Hansa Ortiz at the request of Dr. Holley for evaluation and treatment of LEFT ureteral stone and renal colic.          Chief Complaint:   LEFT ureteral stone and renal colic    History is obtained from the patient          History of Present Illness:   Hansa Ortiz is a 28 year old female who is being seen for evaluation of LEFT ureteral stone and renal colic.  She notes that her pain started a few days previously with acute onset of left-sided flank pain associated with nausea and vomiting.  She presented to the emergency room on 11/30/2023 with a CT scan demonstrating a 3.5 mm left UVJ stone with associated hydroureteronephrosis.  There was no sign of UTI.  She was started on medical expulsive therapy with tamsulosin 0.4 mg daily and discharged.  She presented to the emergency room on 12/1/2023 with worsening flank pain and nausea and vomiting.  She was unable to really keep much of anything down.  She was admitted for pain control and IV hydration.    Seen and examined in the preoperative area.  Just prior to coming down to the preop area she had to void with some hematuria and passed a small stone which she caught in the urine strainer.  Some left-sided flank pain.           Past Medical History:     Past Medical History:   Diagnosis Date    Abnormal uterine bleeding     Back pain     [place] in [place] parking lot in [place].  Has on going mid back pain    OSEI III (cervical intraepithelial neoplasia III) 01/03/2019 8/27/14 ASCUS 9/7/16 ASCUS 10/5/17 ASCUS 11/8/18 HSIL 11/27/18 Colpo ECC OSEI II, bx cervicitis 1/3/19 LEEP OSEI II-III completely excised, ECC neg 1/14/20 NIL pap, neg HPV 5/25/21 NIL pap, +HR HPV (not 16/18). Plan: colposcopy due before 8/25/21    Hx of abnormal cervical Pap smear     Trichomonas infection 01/2020    treated            Past Surgical History:      Past Surgical History:   Procedure Laterality Date    LEEP TX, CERVICAL  01/01/2019    OPERATIVE HYSTEROSCOPY WITH MORCELLATOR N/A 9/7/2022    Procedure: OPERATIVE HYSTEROSCOPY WITH MORCELLATOR (MYOSURE);  Surgeon: Rayna Noel MD;  Location:  OR            Social History:     Social History     Tobacco Use    Smoking status: Never    Smokeless tobacco: Never   Substance Use Topics    Alcohol use: Yes     Comment: wine 1-2x/ week            Family History:     Family History   Problem Relation Age of Onset    Clotting Disorder Mother               Allergies:   No Known Allergies         Medications:     Current Facility-Administered Medications   Medication    [Auto Hold] acetaminophen (TYLENOL) tablet 975 mg    ceFAZolin Sodium (ANCEF) injection 2 g    ceFAZolin Sodium (ANCEF) injection 2 g    [Auto Hold] cefTRIAXone (ROCEPHIN) 1 g vial to attach to  mL bag for ADULTS or NS 50 mL bag for PEDS    [Auto Hold] cyclobenzaprine (FLEXERIL) tablet 10 mg    [Auto Hold] HYDROmorphone (DILAUDID) injection 0.2 mg    [Auto Hold] HYDROmorphone (DILAUDID) injection 0.4 mg    [Auto Hold] ketorolac (TORADOL) injection 30 mg    lactated ringers infusion    lactated ringers infusion    lidocaine (LMX4) cream    [Auto Hold] lidocaine (LMX4) cream    lidocaine 1 % 0.1-1 mL    [Auto Hold] lidocaine 1 % 0.1-1 mL    [Auto Hold] naloxone (NARCAN) injection 0.2 mg    Or    [Auto Hold] naloxone (NARCAN) injection 0.4 mg    Or    [Auto Hold] naloxone (NARCAN) injection 0.2 mg    Or    [Auto Hold] naloxone (NARCAN) injection 0.4 mg    [Auto Hold] ondansetron (ZOFRAN ODT) ODT tab 4 mg    Or    [Auto Hold] ondansetron (ZOFRAN) injection 4 mg    [Auto Hold] oxyCODONE (ROXICODONE) tablet 5 mg    [Auto Hold] oxyCODONE IR (ROXICODONE) half-tab 2.5 mg    [Auto Hold] prochlorperazine (COMPAZINE) injection 10 mg    Or    [Auto Hold] prochlorperazine (COMPAZINE) tablet 10 mg    Or    [Auto Hold] prochlorperazine (COMPAZINE)  suppository 25 mg    [Auto Hold] senna-docusate (SENOKOT-S/PERICOLACE) 8.6-50 MG per tablet 1 tablet    Or    [Auto Hold] senna-docusate (SENOKOT-S/PERICOLACE) 8.6-50 MG per tablet 2 tablet    sodium chloride (PF) 0.9% PF flush 3 mL    sodium chloride (PF) 0.9% PF flush 3 mL    [Auto Hold] sodium chloride (PF) 0.9% PF flush 3 mL    [Auto Hold] sodium chloride (PF) 0.9% PF flush 3 mL    [Auto Hold] tamsulosin (FLOMAX) capsule 0.4 mg             Review of Systems:    ROS: 10 point ROS neg other than the symptoms noted above in the HPI.          Physical Exam:   Patient Vitals for the past 24 hrs:   BP Temp Temp src Pulse Resp SpO2 Weight   12/02/23 0956 132/79 97.4  F (36.3  C) Temporal 51 16 100 % --   12/02/23 0726 134/85 99.3  F (37.4  C) Oral 61 18 98 % --   12/02/23 0246 121/70 98.9  F (37.2  C) Oral 56 18 96 % --   12/02/23 0009 105/58 98.1  F (36.7  C) Oral 57 18 97 % --   12/01/23 1959 129/49 97.9  F (36.6  C) Oral 55 20 100 % 66.5 kg (146 lb 9.6 oz)   12/01/23 1935 -- -- -- -- -- 100 % --   12/01/23 1925 -- -- -- -- -- 100 % --   12/01/23 1915 -- -- -- -- -- 99 % --   12/01/23 1905 -- -- -- -- -- 99 % --   12/01/23 1855 -- -- -- -- -- 100 % --   12/01/23 1445 124/86 -- -- -- -- 99 % --   12/01/23 1155 -- 97  F (36.1  C) Temporal -- -- -- --   12/01/23 1150 135/51 -- -- 80 16 100 % --     General: age-appropriate appearing female in NAD  HEENT: Head AT/NC, EOMI, CN Grossly intact  Lungs: no respiratory distress, or pursed lip breathing  Heart: No obvious jugular venous distension present  Abdomen: soft  Lymph: no palpable inguinal lymphadenopathy.  LE: no edema. pneumoboots in place.  Musculoskeltal: extremities normal, no peripheral edema  Skin: no suspicious lesions or rashes  Neuro: Alert, oriented, speech and mentation normal;  moving all 4 extremities equally.  Psych: affect and mood normal          Data:   All laboratory data reviewed:    Recent Labs   Lab 12/02/23  0647 12/01/23  1433 11/30/23  0005    WBC 10.8 12.6* 6.4   HGB 11.0* 12.1 12.9    296 326       Recent Labs   Lab 12/02/23  0647 12/01/23  1433 11/30/23  0005    138 140   POTASSIUM 3.8 3.5 3.3*   CHLORIDE 104 103 102   CO2 22 19* 24   BUN 12.3 12.7 9.1   CR 1.12* 0.94 0.71   GLC 98 139* 115*   SUMAN 8.7 9.1 9.5       Recent Labs   Lab 12/01/23  1559   COLOR Light Yellow   APPEARANCE Clear   URINEGLC Negative   URINEBILI Negative   URINEKETONE 60*   SG 1.023   URINEPH 7.5*   PROTEIN 10*   NITRITE Negative   LEUKEST Trace*   RBCU 53*   WBCU 3     IMAGING:  All pertinent imaging reviewed:    All imaging studies reviewed by me.  I personally reviewed these imaging films.  A formal report from radiology will follow.    CT ABD/PEL 11/30/2023:  FINDINGS:     LOWER CHEST: Unremarkable.     HEPATOBILIARY: Small low-attenuation lesion in the left lobe of the liver, too small to characterize. Tiny gallstone in the gallbladder.     SPLEEN: Unremarkable.     PANCREAS: Unremarkable.     ADRENAL GLANDS: Unremarkable.     KIDNEYS/BLADDER: A few tiny nonobstructing calculi in both kidneys, the largest a 0.5 cm calculus in the inferior pole of the left kidney. 0.3 cm calculus in the most distal aspect of the left ureter at the ureterovesicular junction. Mild dilatation of   the left ureter. No intrarenal collecting system dilatation. Symmetrical enhancement of both kidneys.     BOWEL: Normal appendix.     LYMPH NODES: Unremarkable.     PELVIC ORGANS: No acute findings.     MUSCULOSKELETAL: No acute findings.     OTHER: None.                                                 IMPRESSION:   1.  0.3 cm distal left ureteral calculus at the ureterovesicular junction, resulting in minimal obstruction.  2.  A few small nonobstructing bilateral renal calculi.          Impression and Plan:   Impression:   28-year-old female with recently passed left distal ureteral stone as well as left kidney stones      Plan:   Kidney stone and recently passed left ureteral stone  -  I reviewed stone which was captured in the urine strainer and exercise appear to be 3 to 4 mm in size consistent with the left UVJ stone noted on CT imaging  - I reviewed her CT scan and reviewed these images personally.  I shared the images with the patient.  I personally performed stone size measurements that the left UVJ stone had measured 3.5 mm in the lower pole stone measures 6 mm  - We discussed treatment options given the passage of the left ureteral stone we could safely cancel the planned ureteroscopy today.  We discussed treatment options for her left kidney stones which would include observation, ESWL, or ureteroscopy.  She is very reluctant to go through any potential stone passage again and would like the stones addressed today  - We will plan to continue with ureteroscopy as planned to clean out the left kidney  - We discussed the need for ureteral stent  - Informed consent completed  - Anticipate she will be able to discharge to home following ureteroscopy  - Proceed to the OR     Jethro Simpson MD   Urology  [facility]  Clinic Phone [phone]

## 2023-12-02 NOTE — OP NOTE
OPERATIVE REPORT  DATE OF SURGERY: 12/02/23  LOCATION OF SURGERY: [facility]  PREOPERATIVE DIAGNOSIS:  (N20.1) Left ureteral stone  (primary encounter diagnosis)  (R10.9) Flank pain  (N20.0) Kidney stone on left side  POSTOPERATIVE DIAGNOSIS: (N20.1) Left ureteral stone  (primary encounter diagnosis)  (R10.9) Flank pain  (N20.0) Kidney stone on left side     START TIME: 11:19 AM  END TIME: 11:49 AM    PROCEDURE PERFORMED:   1. Cystoscopy  2. LEFT retrograde pyelogram  3. LEFT ureteroscopy with laser lithotripsy  4. LEFT ureteroscopy with basketing of stones  5. LEFT JJ stent placement  6. <1hr physician fluoroscopy time      STAFF SURGEON: Jethro Simpson MD  ANESTHESIA: General.   ESTIMATED BLOOD LOSS: 2 mL.   DRAINS AND TUBES: LEFT 6fr X 24cm   COMPLICATIONS: None.   DISPOSITION: PACU.   SPECIMENS OBTAINED:   ID Type Source Tests Collected by Time Destination   A : Left Kidney Stone Calculus/Stone Kidney, Left STONE ANALYSIS Jethro Simpson MD 12/2/2023 11:36 AM       SIGNIFICANT FINDINGS: Cystoscopy with no evidence of stone in the bladder.  Left ureteroscopy with no evidence of stone in the ureter and retrograde pyelogram with no further evidence of stone in the ureter.  Left flexible ureteroscopy with evidence of the large lower pole stone which was fragmented and removed as well as an additional midpole stone which was basketed and removed.     HISTORY OF PRESENT ILLNESS: Hansa Ortiz is a 28 year old female who presented with left renal colic and a left distal ureteral stone was also found to have left kidney stones.  She passed her left ureteral stone just prior to transfer to preoperative area.  She was counseled on treatment options for her left kidney stones and like to proceed with the above surgery.    OPERATION PERFORMED:   Informed consent was obtained and the patient was brought to the operating room where general anesthesia was induced. The patient was given appropriate preoperative antibiotics and  positioned supine. The patient was then repositioned in dorsal lithotomy with all pressure points padded. We then performed a timeout, verifying the correct patient's site and procedure to be performed.    22 English cystoscope was inserted atraumatically into the bladder.  Cystoscopy was performed no evidence of stones in the ureter.  The left ureteral orifice identified and cannulated with a 0.035 sensor wire which was advanced to the renal pelvis under fluoroscopic guidance and the cystoscope was removed.  A semirigid ureteroscope was assembled and inserted atraumatically into the bladder.  Using Amplatz Super Stiff wire the ureteral orifice was cannulated and the semirigid ureteroscope was advanced using a railroad technique.  There is no evidence of stone in the ureter or significant ureteral injury.  The Super Stiff wire was advanced up to the renal pelvis.  A gentle retrograde pyelogram was performed with no evidence of stones in the ureter.  The ureteroscope was removed.  An 11-13 English by 36 cm ureteral access sheath was advanced over the wire to the UPJ and the inner stylette and wire removed.  A flexible ureteroscope was advanced up to the renal pelvis and flexible pyeloscopy was performed the lower pole stone was identified and this was noted to be partially adherent to the renal papilla.  This was fragmented with laser lithotripsy and the stone was basketed and removed.  An additional small stone was also found to be adherent to the papilla in the midpole which was basketed and removed.  No further stones were identified.  The ureteroscope and access sheath were removed en bloc with no evidence of ureteral injury.  A 6 English by 24 cm JJ ureteral stent was advanced over the wire with good curl noted in the renal pelvis and in the bladder fluoroscopically.  A 16 English Best catheter was placed with 10 cc in the balloon.  She received 10 mg IV Lasix and 30 mg IV Toradol.  She was emerged from anesthesia  and taken to recovery room in stable condition.    Plan:  - Best catheter may be removed in the PACU when she is fully emerged from anesthesia  - I will complete urology discharge orders and instructions  - Okay for discharge from urology standpoint and I will arrange for follow-up for stent removal    Jethro Simpson MD   Urology  [facility]  Clinic Phone [phone]

## 2023-12-02 NOTE — DISCHARGE INSTRUCTIONS
POSTOPERATIVE INSTRUCTIONS    Diagnosis-------------------------------   Left kidney stones    Procedure-------------------------------  Procedure(s) (LRB):  CYSTOSCOPY, RETROGRADE PYELOGRAM, LEFT URETEROSCOPY WITH LASER LITHOTRIOPSY AND BASKET REMOVAL OF STONES, LEFT URETERAL STENT PLACEMENT. laser lithotripsy (Left)      Findings--------------------------------  Left kidney stones removed    Home-going instructions-----------------         Activity Limitation:     - No driving or operating heavy machinery while on narcotic pain medication.     FOLLOW THESE INSTRUCTIONS AS INDICATED BELOW:  - Observe operative area for signs of excessive bleeding.  - You may shower.  - Increase fluid intake to promote clear urine.  - Resume usual diet as tolerated    What to expect while recovering-----------  - You may experience some intermittent bleeding that makes your urine pink or cherry colored. This is normal.  - However, if you are unable to urinate, passing large amount of clots, have elizabeth blood in your urine, or have a temperature >101 degrees, call the urology nurse on call, or present to your nearest emergency department.  - You are encouraged to walk daily, and have no activity restrictions.   - A URETERAL STENT has been placed that allows urine to flow unobstructed from your kidney into your bladder.  The stent has a curl in the kidney and a curl in the bladder.  The curl in the bladder can cause some urgency and frequency of urination as well as some mild blood in the urine.  The curl in the kidney can cause some mild flank discomfort.  This may be more noticeable when you urinate.  A URETERAL STENT is meant to be left in temporarily.  It must be removed or changed no later than 3 months after it's insertion.  If it's not removed it can result in stone overgrowth on the stent that can cause pain, infection, and can be very difficult to remove.      Discharge Medications/instructions:   - Flomax (tamsulosin) to be  taken daily until stent is removed  - Oxybutynin 5mg XL (Ditropan XL) to be taken daily until ureteral stent is removed  - Take Tylenol 1000mg every 6 hours for pain  - Take Ibuprofen 600mg every 6 hours as needed for additional pain control  - Take Oxycodone 5mg every 4-6 hours only for break through pain  - Take Colace while taking Oxycodone to prevent constipation      Questions/concerns------------------------  [facility]: [phone]    Future appointments  You will be contacted to schedule a visit with Dr. Simpson for stent removal    Jethro Simpson MD   GENERAL ANESTHESIA OR SEDATION ADULT DISCHARGE INSTRUCTIONS   SPECIAL PRECAUTIONS FOR 24 HOURS AFTER SURGERY    IT IS NOT UNUSUAL TO FEEL LIGHT-HEADED OR FAINT, UP TO 24 HOURS AFTER SURGERY OR WHILE TAKING PAIN MEDICATION.  IF YOU HAVE THESE SYMPTOMS; SIT FOR A FEW MINUTES BEFORE STANDING AND HAVE SOMEONE ASSIST YOU WHEN YOU GET UP TO WALK OR USE THE BATHROOM.    YOU SHOULD REST AND RELAX FOR THE NEXT 24 HOURS AND YOU MUST MAKE ARRANGEMENTS TO HAVE SOMEONE STAY WITH YOU FOR AT LEAST 24 HOURS AFTER YOUR DISCHARGE.  AVOID HAZARDOUS AND STRENUOUS ACTIVITIES.  DO NOT MAKE IMPORTANT DECISIONS FOR 24 HOURS.    DO NOT DRIVE ANY VEHICLE OR OPERATE MECHANICAL EQUIPMENT FOR 24 HOURS FOLLOWING THE END OF YOUR SURGERY.  EVEN THOUGH YOU MAY FEEL NORMAL, YOUR REACTIONS MAY BE AFFECTED BY THE MEDICATION YOU HAVE RECEIVED.    DO NOT DRINK ALCOHOLIC BEVERAGES FOR 24 HOURS FOLLOWING YOUR SURGERY.    DRINK CLEAR LIQUIDS (APPLE JUICE, GINGER ALE, 7-UP, BROTH, ETC.).  PROGRESS TO YOUR REGULAR DIET AS YOU FEEL ABLE.    YOU MAY HAVE A DRY MOUTH, A SORE THROAT, MUSCLES ACHES OR TROUBLE SLEEPING.  THESE SHOULD GO AWAY AFTER 24 HOURS.    CALL YOUR DOCTOR FOR ANY OF THE FOLLOWING:  SIGNS OF INFECTION (FEVER, GROWING TENDERNESS AT THE SURGERY SITE, A LARGE AMOUNT OF DRAINAGE OR BLEEDING, SEVERE PAIN, FOUL-SMELLING DRAINAGE, REDNESS OR SWELLING.    IT HAS BEEN OVER 8 TO  10 HOURS SINCE SURGERY AND YOU ARE STILL NOT ABLE TO URINATE (PASS WATER).

## 2023-12-02 NOTE — PLAN OF CARE
Goal Outcome Evaluation:      Plan of Care Reviewed With: patient      Aox4. VSS on RA. LR @100. Tolerating Oxy for pain  and Toradol IV. Reports nausea and vomiting Compazine given and tolerating. LS clear. Skin wdl. Ax1 gaitbelt. NPO after midnight. Rocephin given x1.     Lactic 2.7, WBC 12.6,    Plan: Pain management, IVF, IV Abx, Urology following.

## 2023-12-02 NOTE — ANESTHESIA PREPROCEDURE EVALUATION
Anesthesia Pre-Procedure Evaluation    Patient: Hansa Ortiz   MRN: [id number] : 1995        Procedure : Procedure(s):  CYSTOSCOPY, RETROGRADE PYELOGRAM, LEFT URETEROSCOPY WITH LASER LITHOTRIOPSY AND BASKET REMOVAL OF STONES, LEFT URETERAL STENT PLACEMENT. laser lithotripsy          Past Medical History:   Diagnosis Date    Abnormal uterine bleeding     Back pain     [place] in [place] parking lot in February.  Has on going mid back pain    OSEI III (cervical intraepithelial neoplasia III) 2019 ASCUS 16 ASCUS 10/5/17 ASCUS 18 HSIL 18 Colpo ECC OSEI II, bx cervicitis 1/3/19 LEEP OSEI II-III completely excised, ECC neg 20 NIL pap, neg HPV 21 NIL pap, +HR HPV (not 16/18). Plan: colposcopy due before 21    Hx of abnormal cervical Pap smear     Trichomonas infection 2020    treated      Past Surgical History:   Procedure Laterality Date    LEEP TX, CERVICAL  2019    OPERATIVE HYSTEROSCOPY WITH MORCELLATOR N/A 2022    Procedure: OPERATIVE HYSTEROSCOPY WITH MORCELLATOR (MYOSURE);  Surgeon: Rayna Noel MD;  Location: [facility]      No Known Allergies   Social History     Tobacco Use    Smoking status: Never    Smokeless tobacco: Never   Substance Use Topics    Alcohol use: Yes     Comment: wine 1-2x/ week      Wt Readings from Last 1 Encounters:   23 66.5 kg (146 lb 9.6 oz)        Anesthesia Evaluation   Pt has had prior anesthetic. Type: General.    History of anesthetic complications  - PONV.      ROS/MED HX  ENT/Pulmonary:  - neg pulmonary ROS   (+)                             recent URI, unresolved, Mild cough x1 month:     (-) asthma   Neurologic:  - neg neurologic ROS     Cardiovascular:  - neg cardiovascular ROS     METS/Exercise Tolerance:     Hematologic:       Musculoskeletal:       GI/Hepatic:  - neg GI/hepatic ROS  (-) GERD   Renal/Genitourinary:     (+)       Nephrolithiasis ,       Endo:       Psychiatric/Substance Use:      "  Infectious Disease:       Malignancy:       Other:            Physical Exam    Airway        Mallampati: II   TM distance: > 3 FB   Neck ROM: full   Mouth opening: > 3 cm    Respiratory Devices and Support         Dental       (+) Completely normal teeth      Cardiovascular   cardiovascular exam normal       Rhythm and rate: regular and normal     Pulmonary   pulmonary exam normal        breath sounds clear to auscultation           OUTSIDE LABS:  CBC:   Lab Results   Component Value Date    WBC 10.8 12/02/2023    WBC 12.6 (H) 12/01/2023    HGB 11.0 (L) 12/02/2023    HGB 12.1 12/01/2023    HCT 32.5 (L) 12/02/2023    HCT 37.0 12/01/2023     12/02/2023     12/01/2023     BMP:   Lab Results   Component Value Date     12/02/2023     12/01/2023    POTASSIUM 3.8 12/02/2023    POTASSIUM 3.5 12/01/2023    CHLORIDE 104 12/02/2023    CHLORIDE 103 12/01/2023    CO2 22 12/02/2023    CO2 19 (L) 12/01/2023    BUN 12.3 12/02/2023    BUN 12.7 12/01/2023    CR 1.12 (H) 12/02/2023    CR 0.94 12/01/2023    GLC 98 12/02/2023     (H) 12/01/2023     COAGS: No results found for: \"PTT\", \"INR\", \"FIBR\"  POC:   Lab Results   Component Value Date    HCG Negative 12/01/2023    HCGS Negative 11/30/2023     HEPATIC:   Lab Results   Component Value Date    ALBUMIN 4.0 05/09/2020    PROTTOTAL 8.2 05/09/2020    ALT 45 05/09/2020    AST 29 05/09/2020    ALKPHOS 49 05/09/2020    BILITOTAL 0.6 05/09/2020     OTHER:   Lab Results   Component Value Date    LACT 2.7 (H) 12/01/2023    A1C 5.2 08/25/2020    SUMAN 8.7 12/02/2023    LIPASE 48 (L) 05/09/2020       Anesthesia Plan    ASA Status:  1    NPO Status:  NPO Appropriate    Anesthesia Type: General.     - Airway: LMA   Induction: Intravenous.   Maintenance: Balanced.        Consents    Anesthesia Plan(s) and associated risks, benefits, and realistic alternatives discussed. Questions answered and patient/representative(s) expressed understanding.     - Discussed:     - " Discussed with:  Patient      - Extended Intubation/Ventilatory Support Discussed: No.      - Patient is DNR/DNI Status: No     Use of blood products discussed: No .     Postoperative Care    Pain management: IV analgesics, Oral pain medications, Multi-modal analgesia.   PONV prophylaxis: Ondansetron (or other 5HT-3), Dexamethasone or Solumedrol, Scopolamine patch     Comments:               Marylou Jackson MD    I have reviewed the pertinent notes and labs in the chart from the past 30 days and (re)examined the patient.  Any updates or changes from those notes are reflected in this note.    # Hypokalemia: Lowest K = 3.3 mmol/L in last 30 days, will replace as needed

## 2023-12-02 NOTE — PLAN OF CARE
PRIMARY DIAGNOSIS: ACUTE RENAL COLIC    OUTPATIENT/OBSERVATION GOALS TO BE MET BEFORE DISCHARGE  1. Pain Status: Improved-controlled with oral pain medications.    2. Tolerating adequate PO diet: No    3. Surgical Intervention planned: N/A    4. Cleared by consultants (if involved): N/A    5. Return to near baseline physical activity: Yes    Discharge Planner Nurse   Safe discharge environment identified: Yes  Barriers to discharge: Yes       Entered by: Janelle Gamino RN 12/02/2023 10:01 AM   Pt. A&O, SBA for mobility, voided 300cc with some stones, MD was notefied, NPO for medical reason.  /79 (BP Location: Left arm)   Pulse 51   Temp 97.4  F (36.3  C) (Temporal)   Resp 16   Wt 66.5 kg (146 lb 9.6 oz)   LMP 10/23/2023   SpO2 100%   BMI 25.97 kg/m     Please review provider order for any additional goals.   Nurse to notify provider when observation goals have been met and patient is ready for discharge.Goal Outcome Evaluation:      Plan of Care Reviewed With: patient

## 2023-12-02 NOTE — ANESTHESIA POSTPROCEDURE EVALUATION
Patient: Hansa Ortiz    Procedure: Procedure(s):  CYSTOSCOPY, RETROGRADE PYELOGRAM, LEFT URETEROSCOPY WITH LASER LITHOTRIOPSY AND BASKET REMOVAL OF STONES, LEFT URETERAL STENT PLACEMENT. laser lithotripsy       Anesthesia Type:  General    Note:  Disposition: Inpatient   Postop Pain Control:    PONV: No   Neuro/Psych: Uneventful            Sign Out: Acceptable/Baseline neuro status   Airway/Respiratory: Uneventful            Sign Out: Acceptable/Baseline resp. status   CV/Hemodynamics: Uneventful            Sign Out: Acceptable CV status; No obvious hypovolemia; No obvious fluid overload   Other NRE:    DID A NON-ROUTINE EVENT OCCUR? No           Last vitals:  Vitals Value Taken Time   /86 12/02/23 1220   Temp 98.9  F (37.2  C) 12/02/23 1200   Pulse 75 12/02/23 1233   Resp 12 12/02/23 1233   SpO2 99 % 12/02/23 1233   Vitals shown include unfiled device data.    Electronically Signed By: Marylou Jackson MD  December 2, 2023  12:56 PM

## 2023-12-02 NOTE — ANESTHESIA PROCEDURE NOTES
Airway       Patient location during procedure: OR       Procedure Start/Stop Times: 12/2/2023 11:12 AM  Staff -        Anesthesiologist:  Marylou Jackson MD       CRNA: Juli Mariee APRN CRNA       Performed By: CRNA  Consent for Airway        Urgency: elective  Indications and Patient Condition       Indications for airway management: layla-procedural       Induction type:intravenous       Mask difficulty assessment: 0 - not attempted    Final Airway Details       Final airway type: supraglottic airway    Supraglottic Airway Details        Type: LMA       Brand: I-Gel       LMA size: 4    Post intubation assessment        Placement verified by: capnometry and chest rise        Number of attempts at approach: 1       Number of other approaches attempted: 0       Secured with: commercial tube godinez       Ease of procedure: easy       Dentition: Intact and Unchanged    Medication(s) Administered   Medication Administration Time: 12/2/2023 11:12 AM

## 2023-12-02 NOTE — PROGRESS NOTES
Nurse talked to fabyitis Kevan Recio, about discharged pt today after surgery from PACCU, and MD lyman with that plan.

## 2023-12-02 NOTE — ANESTHESIA CARE TRANSFER NOTE
Patient: Hansa Ortiz    Procedure: Procedure(s):  CYSTOSCOPY, RETROGRADE PYELOGRAM, LEFT URETEROSCOPY WITH LASER LITHOTRIOPSY AND BASKET REMOVAL OF STONES, LEFT URETERAL STENT PLACEMENT. laser lithotripsy       Diagnosis: Left ureteral stone [N20.1]  Diagnosis Additional Information: No value filed.    Anesthesia Type:   General     Note:    Oropharynx: oropharynx clear of all foreign objects and spontaneously breathing  Level of Consciousness: drowsy and awake  Oxygen Supplementation: face mask  Level of Supplemental Oxygen (L/min / FiO2): 6  Independent Airway: airway patency satisfactory and stable  Dentition: dentition unchanged  Vital Signs Stable: post-procedure vital signs reviewed and stable  Report to RN Given: handoff report given  Patient transferred to: PACU    Handoff Report: Identifed the Patient, Identified the Reponsible Provider, Reviewed the pertinent medical history, Discussed the surgical course, Reviewed Intra-OP anesthesia mangement and issues during anesthesia, Set expectations for post-procedure period and Allowed opportunity for questions and acknowledgement of understanding  Vitals:  Vitals Value Taken Time   /77 12/02/23 1200   Temp     Pulse 81 12/02/23 1205   Resp 29 12/02/23 1205   SpO2 100 % 12/02/23 1205   Vitals shown include unfiled device data.    Electronically Signed By: PHYLLIS Tomlin CRNA  December 2, 2023  12:05 PM

## 2023-12-03 ENCOUNTER — HOSPITAL ENCOUNTER (EMERGENCY)
Facility: CLINIC | Age: 28
Discharge: HOME | End: 2023-12-03
Attending: STUDENT IN AN ORGANIZED HEALTH CARE EDUCATION/TRAINING PROGRAM | Admitting: STUDENT IN AN ORGANIZED HEALTH CARE EDUCATION/TRAINING PROGRAM
Payer: COMMERCIAL

## 2023-12-03 VITALS
DIASTOLIC BLOOD PRESSURE: 61 MMHG | TEMPERATURE: 99.2 F | OXYGEN SATURATION: 97 % | BODY MASS INDEX: 24.32 KG/M2 | SYSTOLIC BLOOD PRESSURE: 112 MMHG | HEART RATE: 78 BPM | HEIGHT: 65 IN | RESPIRATION RATE: 15 BRPM | WEIGHT: 146 LBS

## 2023-12-03 DIAGNOSIS — R11.2 NAUSEA AND VOMITING, UNSPECIFIED VOMITING TYPE: ICD-10-CM

## 2023-12-03 DIAGNOSIS — R10.9 FLANK PAIN: ICD-10-CM

## 2023-12-03 LAB
ALBUMIN UR-MCNC: 20 MG/DL
ANION GAP SERPL CALCULATED.3IONS-SCNC: 17 MMOL/L (ref 7–15)
APPEARANCE UR: ABNORMAL
BILIRUB UR QL STRIP: NEGATIVE
BUN SERPL-MCNC: 14.5 MG/DL (ref 6–20)
CALCIUM SERPL-MCNC: 9.5 MG/DL (ref 8.6–10)
CHLORIDE SERPL-SCNC: 103 MMOL/L (ref 98–107)
COLOR UR AUTO: ABNORMAL
CREAT SERPL-MCNC: 0.8 MG/DL (ref 0.51–0.95)
DEPRECATED HCO3 PLAS-SCNC: 20 MMOL/L (ref 22–29)
EGFRCR SERPLBLD CKD-EPI 2021: >90 ML/MIN/1.73M2
ERYTHROCYTE [DISTWIDTH] IN BLOOD BY AUTOMATED COUNT: 13 % (ref 10–15)
GLUCOSE SERPL-MCNC: 113 MG/DL (ref 70–99)
GLUCOSE UR STRIP-MCNC: NEGATIVE MG/DL
HCT VFR BLD AUTO: 36.7 % (ref 35–47)
HGB BLD-MCNC: 12.3 G/DL (ref 11.7–15.7)
HGB UR QL STRIP: ABNORMAL
KETONES UR STRIP-MCNC: NEGATIVE MG/DL
LEUKOCYTE ESTERASE UR QL STRIP: ABNORMAL
MCH RBC QN AUTO: 30.8 PG (ref 26.5–33)
MCHC RBC AUTO-ENTMCNC: 33.5 G/DL (ref 31.5–36.5)
MCV RBC AUTO: 92 FL (ref 78–100)
MUCOUS THREADS #/AREA URNS LPF: PRESENT /LPF
NITRATE UR QL: NEGATIVE
PH UR STRIP: 7 [PH] (ref 5–7)
PLATELET # BLD AUTO: 271 10E3/UL (ref 150–450)
POTASSIUM SERPL-SCNC: 3.3 MMOL/L (ref 3.4–5.3)
RBC # BLD AUTO: 4 10E6/UL (ref 3.8–5.2)
RBC URINE: >182 /HPF
SODIUM SERPL-SCNC: 140 MMOL/L (ref 135–145)
SP GR UR STRIP: 1.01 (ref 1–1.03)
SQUAMOUS EPITHELIAL: 2 /HPF
UROBILINOGEN UR STRIP-MCNC: NORMAL MG/DL
WBC # BLD AUTO: 10.2 10E3/UL (ref 4–11)
WBC URINE: 29 /HPF

## 2023-12-03 PROCEDURE — 99284 EMERGENCY DEPT VISIT MOD MDM: CPT | Mod: 25

## 2023-12-03 PROCEDURE — 96374 THER/PROPH/DIAG INJ IV PUSH: CPT

## 2023-12-03 PROCEDURE — 85027 COMPLETE CBC AUTOMATED: CPT | Performed by: STUDENT IN AN ORGANIZED HEALTH CARE EDUCATION/TRAINING PROGRAM

## 2023-12-03 PROCEDURE — 96375 TX/PRO/DX INJ NEW DRUG ADDON: CPT

## 2023-12-03 PROCEDURE — 81001 URINALYSIS AUTO W/SCOPE: CPT | Performed by: STUDENT IN AN ORGANIZED HEALTH CARE EDUCATION/TRAINING PROGRAM

## 2023-12-03 PROCEDURE — 87086 URINE CULTURE/COLONY COUNT: CPT | Performed by: STUDENT IN AN ORGANIZED HEALTH CARE EDUCATION/TRAINING PROGRAM

## 2023-12-03 PROCEDURE — 36415 COLL VENOUS BLD VENIPUNCTURE: CPT | Performed by: STUDENT IN AN ORGANIZED HEALTH CARE EDUCATION/TRAINING PROGRAM

## 2023-12-03 PROCEDURE — 258N000003 HC RX IP 258 OP 636: Performed by: STUDENT IN AN ORGANIZED HEALTH CARE EDUCATION/TRAINING PROGRAM

## 2023-12-03 PROCEDURE — 96361 HYDRATE IV INFUSION ADD-ON: CPT

## 2023-12-03 PROCEDURE — 250N000011 HC RX IP 250 OP 636: Performed by: STUDENT IN AN ORGANIZED HEALTH CARE EDUCATION/TRAINING PROGRAM

## 2023-12-03 PROCEDURE — 80048 BASIC METABOLIC PNL TOTAL CA: CPT | Performed by: STUDENT IN AN ORGANIZED HEALTH CARE EDUCATION/TRAINING PROGRAM

## 2023-12-03 RX ORDER — ONDANSETRON 4 MG/1
4 TABLET, ORALLY DISINTEGRATING ORAL EVERY 8 HOURS PRN
Qty: 10 TABLET | Refills: 0 | Status: SHIPPED | OUTPATIENT
Start: 2023-12-03 | End: 2023-12-06

## 2023-12-03 RX ORDER — DROPERIDOL 2.5 MG/ML
1.25 INJECTION, SOLUTION INTRAMUSCULAR; INTRAVENOUS ONCE
Status: COMPLETED | OUTPATIENT
Start: 2023-12-03 | End: 2023-12-03

## 2023-12-03 RX ORDER — HYDROMORPHONE HYDROCHLORIDE 1 MG/ML
0.5 INJECTION, SOLUTION INTRAMUSCULAR; INTRAVENOUS; SUBCUTANEOUS EVERY 30 MIN PRN
Status: DISCONTINUED | OUTPATIENT
Start: 2023-12-03 | End: 2023-12-03 | Stop reason: HOSPADM

## 2023-12-03 RX ORDER — METOCLOPRAMIDE 10 MG/1
10 TABLET ORAL 3 TIMES DAILY PRN
Qty: 15 TABLET | Refills: 0 | Status: SHIPPED | OUTPATIENT
Start: 2023-12-03 | End: 2023-12-08

## 2023-12-03 RX ADMIN — HYDROMORPHONE HYDROCHLORIDE 0.5 MG: 1 INJECTION, SOLUTION INTRAMUSCULAR; INTRAVENOUS; SUBCUTANEOUS at 10:19

## 2023-12-03 RX ADMIN — SODIUM CHLORIDE 1000 ML: 9 INJECTION, SOLUTION INTRAVENOUS at 10:19

## 2023-12-03 RX ADMIN — DROPERIDOL 1.25 MG: 2.5 INJECTION, SOLUTION INTRAMUSCULAR; INTRAVENOUS at 10:19

## 2023-12-03 ASSESSMENT — ACTIVITIES OF DAILY LIVING (ADL)
ADLS_ACUITY_SCORE: 35
ADLS_ACUITY_SCORE: 35

## 2023-12-03 NOTE — ED PROVIDER NOTES
"History   Chief Complaint:  Flank Pain       HPI:  Hansa Ortiz is a very pleasant 28 year old female presenting with nausea, vomiting and left-sided flank pain.  Of note, the patient was discharged yesterday from [facility] after being found to have ureterolithiasis and having a cystoscopy and left ureteral stent placement.  She originally presented to the emergency department on 11/30/2023, at which time the 3.5 mm left UVJ stone was noted.  A 6 mm stone was also noted in the kidney.  She underwent left ureteroscopy with basketing of stones and a left JJ stent placement.  Today she presents with continued pain in the left flank, along with nausea and vomiting, making it hard to take oral analgesics.  Denies fever, chills, dysuria, or any urinary changes.  She does have hematuria.    Independent Historian:  None. Only the patient provided history.    Review of External Notes:  I personally reviewed notes from the patient's discharge summary dated yesterday. This provided me with information regarding patient's recent clinical course and patient's recent hospitalization.     I personally reviewed the patient's chart, including available medication list and available past medical history, past surgical history, family history, and social history.    Physical Exam   Patient Vitals for the past 24 hrs:   BP Temp Temp src Pulse Resp SpO2 Height Weight   12/03/23 1200 112/61 -- -- 78 15 97 % -- --   12/03/23 1100 112/70 -- -- 52 15 98 % -- --   12/03/23 1024 (!) 160/92 -- -- 60 -- -- -- --   12/03/23 0950 (!) 131/94 99.2  F (37.3  C) Oral 64 18 98 % 1.651 m (5' 5\") 66.2 kg (146 lb)   12/03/23 0947 (!) 131/94 -- -- 70 16 96 % -- --      Physical Exam  Vitals and nursing note reviewed.   Constitutional:       General: She is in acute distress.      Appearance: Normal appearance.      Comments: Retching   HENT:      Mouth/Throat:      Mouth: Mucous membranes are moist.   Cardiovascular:      Rate and Rhythm: Normal " rate.   Abdominal:      General: Abdomen is flat.      Tenderness: There is no abdominal tenderness. There is no right CVA tenderness, left CVA tenderness, guarding or rebound.   Skin:     General: Skin is warm and dry.   Neurological:      Mental Status: She is alert.            Emergency Department Course     ECG & Imaging Laboratory:   No ECG performed.    No orders to display        Labs Ordered and Resulted from Time of ED Arrival to Time of ED Departure   BASIC METABOLIC PANEL - Abnormal       Result Value    Sodium 140      Potassium 3.3 (*)     Chloride 103      Carbon Dioxide (CO2) 20 (*)     Anion Gap 17 (*)     Urea Nitrogen 14.5      Creatinine 0.80      GFR Estimate >90      Calcium 9.5      Glucose 113 (*)    ROUTINE UA WITH MICROSCOPIC REFLEX TO CULTURE - Abnormal    Color Urine Straw      Appearance Urine Slightly Cloudy (*)     Glucose Urine Negative      Bilirubin Urine Negative      Ketones Urine Negative      Specific Gravity Urine 1.015      Blood Urine Large (*)     pH Urine 7.0      Protein Albumin Urine 20 (*)     Urobilinogen Urine Normal      Nitrite Urine Negative      Leukocyte Esterase Urine Moderate (*)     Mucus Urine Present (*)     RBC Urine >182 (*)     WBC Urine 29 (*)     Squamous Epithelials Urine 2 (*)    CBC WITH PLATELETS - Normal    WBC Count 10.2      RBC Count 4.00      Hemoglobin 12.3      Hematocrit 36.7      MCV 92      MCH 30.8      MCHC 33.5      RDW 13.0      Platelet Count 271     URINE CULTURE         Procedures  None performed    Interventions & Assessments:           Interventions:  Medications   HYDROmorphone (PF) (DILAUDID) injection 0.5 mg (0.5 mg Intravenous $Given 12/3/23 1019)   droPERidol (INAPSINE) injection 1.25 mg (1.25 mg Intravenous $Given 12/3/23 1019)   sodium chloride 0.9% BOLUS 1,000 mL (0 mLs Intravenous Stopped 12/3/23 1203)        Assessments  Independent Interpretations  Consultations/Discussion of Management or Tests       Social Determinants  of Health affecting care:   None.      Disposition:  The patient was discharged to home.     Impression & Plan        Medical Decision Making:  Patient presenting with flank pain, nausea and vomiting after ureteral stent placement.  Vital signs reassuring upon arrival.  Treated patient with fluids, droperidol and hydromorphone, with good success.  Obtained urinalysis to evaluate for urinary tract infection.  This was reassuring.  There was significant amount of hematuria, as expected, but only small amount of pyuria with no nitrites.  Suspect this is secondary to ureteral stent placement.  Patient does not have any urinary symptoms that would be suggestive of UTI.  Urine culture was sent by reflex.  BMP was reassuring against any acute kidney injury.  Patient did have a mildly elevated anion gap metabolic acidosis, which I suspect is likely secondary to starvation ketosis in the context of the patient's not eating very much at present.  Patient with no leukocytosis.  Do not feel that imaging was indicated at this time.  We will plan for patient follow-up with urology as planned. Findings were discussed. , Additional verbal instructions were provided. , I discussed specific warning signs and instructed the patient to return to the emergency department if there are any concerns., Understanding of instructions was voiced, questions were answered and the patient was discharged.       Diagnosis:    ICD-10-CM    1. Flank pain  R10.9       2. Nausea and vomiting, unspecified vomiting type  R11.2            Discharge Medications:  Discharge Medication List as of 12/3/2023  2:25 PM        START taking these medications    Details   metoclopramide (REGLAN) 10 MG tablet Take 1 tablet (10 mg) by mouth 3 times daily as needed (nausea), Disp-15 tablet, R-0, Local Print              Kan Abad MD  12/03/23 6622

## 2023-12-03 NOTE — DISCHARGE INSTRUCTIONS
Thank you for allowing us to evaluate you today.  Follow up with primary care clinician  in 1 week for reevaluation..  Take the ondansetron as prescribed for nausea.   Also take the metoclopramide as needed for nausea.   Continue to take your pain meds.   Please read the guidance provided with your discharge instructions.  Immediately return to the emergency department with any concerns.

## 2023-12-03 NOTE — ED NOTES
Bed: ED19  Expected date: 12/3/23  Expected time: 9:37 AM  Means of arrival: Ambulance  Comments:  [facility] 58F flank pain ETA [id number]

## 2023-12-03 NOTE — ED TRIAGE NOTES
Pt presents one day post getting stent placed for kidney stone. Pt attempted to take oral ibu but threw medication up. Pt given 8 mg Zofran and 15 mg Ketoralac by EMS. Pt had procedure done at [facility].      Triage Assessment (Adult)       Row Name 12/03/23 0947          Triage Assessment    Airway WDL WDL        Cardiac WDL    Cardiac WDL WDL        Peripheral/Neurovascular WDL    Peripheral Neurovascular WDL WDL        Cognitive/Neuro/Behavioral WDL    Cognitive/Neuro/Behavioral WDL WDL

## 2023-12-04 LAB — BACTERIA UR CULT: NO GROWTH

## 2023-12-05 LAB
APPEARANCE STONE: NORMAL
COMPN STONE: NORMAL
SPECIMEN WT: 24 MG

## 2023-12-13 ENCOUNTER — OFFICE VISIT (OUTPATIENT)
Dept: UROLOGY | Facility: CLINIC | Age: 28
End: 2023-12-13
Payer: COMMERCIAL

## 2023-12-13 VITALS
DIASTOLIC BLOOD PRESSURE: 77 MMHG | SYSTOLIC BLOOD PRESSURE: 113 MMHG | WEIGHT: 146 LBS | BODY MASS INDEX: 24.32 KG/M2 | HEART RATE: 74 BPM | HEIGHT: 65 IN | OXYGEN SATURATION: 97 %

## 2023-12-13 DIAGNOSIS — Z46.6 ENCOUNTER FOR REMOVAL OF URETERAL STENT: Primary | ICD-10-CM

## 2023-12-13 PROCEDURE — 52310 CYSTOSCOPY AND TREATMENT: CPT | Performed by: UROLOGY

## 2023-12-13 ASSESSMENT — PAIN SCALES - GENERAL: PAINLEVEL: MODERATE PAIN (4)

## 2023-12-13 NOTE — PROGRESS NOTES
CYSTOSCOPY and URETERAL STENT REMOVAL PROCEDURE NOTE:    Hansa Ortiz is a 28 year old female who presents with ureteral stent  for a cystoscopy and ureteral stent removal.    Pt ID verified with patient: Yes     Procedure verified with patient: Yes     Procedure confirmed with physician and support staff: Yes     Consent confirmed with physician and support staff.    Sign In:  History and Physical Exam reviewed  Primary Diagnosis: ureteral stent   Informed Consent Discussed: Yes   Sign in Communication: Yes   Time Out:  Team Confirms the Correct Patient, Correct Procedure; Yes , Correct Site and Site Marking, Correct Position (if applicable).  Affirmation of Time Out: Yes   Sign Out:  Sign Out Discussion: Yes   Physician: Jethro Simpson MD    Hansa Ortiz is a 28 year old female with an indwelling ureteral stent in need of removal.      CYSTOSCOPY PROCEDURE:  After sterile preparation and draping of the patient,  a 17-Kyrgyz flexible cystoscope was introduced via the urethra.  It was passed without difficulty into the bladder.  The urethra was open without evidence of stricture.  The ureteral orifices were orthotopic.  The double J stent was seen coming out the right side.  It was grasped with an alligator forceps and extracted intact without difficulty.  The patient tolerated the procedure well.    Stone Composition See Note    Comment: Calculi composed primarily of:  10% calcium oxalate monohydrate, and  90% calcium phosphate (hydroxy- and carbonate- apatite).       A/P Successful stent removal  Prophylactic antibiotic ordered   Stone prevention counseling provided today    Watch for any new onset fevers, signs of UTI.  May expect some pain after removal.  If this is severe, or last many hours, you may need to return for replacement of stent.    Jethro Simpson MD   Urology  [facility]    
(V5) oriented

## 2023-12-13 NOTE — NURSING NOTE
Prior to the start of the procedure and with procedural staff participation, I verbally confirmed the patient s identity using two indicators, relevant allergies, that the procedure was appropriate and matched the consent or emergent situation, and that the correct equipment/implants were available. Immediately prior to starting the procedure I conducted the Time Out with the procedural staff and re-confirmed the patient s name, procedure, and site/side. I have wiped the patient off with the povidone-Iodine solution, draped them,  used Lidocaine hydrochloride jelly, and instilled sterile water into the bladder. (The Joint Commission universal protocol was followed.)  Yes    Sedation (Moderate or Deep): None     KEDAR Mishra CMA     Told mom to have the school fax us the paperwork and we will fill it out and fax it to the [facility]

## 2023-12-13 NOTE — PATIENT INSTRUCTIONS
"AFTER YOUR CYSTOSCOPY  ?  ?  You have just completed a cystoscopy, or \"cysto\", which allowed your physician to learn more about your bladder (or to remove a stent placed after surgery). We suggest that you continue to avoid caffeine, fruit juice, and alcohol for the next 24 hours, however, you are encouraged to return to your normal activities.  ?  ?  A few things that are considered normal after your cystoscopy:  ?  * small amount of bleeding (or spotting) that clears within the next 24 hours  ?  * slight burning sensation with urination  ?  * sensation of needing to void (urinate) more frequently  ?  * the feeling of \"air\" in your urine  ?  * mild discomfort that is relieved with Tylenol    * bladder spasms  ?  ?  ?  Please contact our office promptly if you:  ?  * develop a fever above 101 degrees  ?  * are unable to urinate  ?  * develop bright red blood that does not stop  ?  * experience severe pain or swelling  ?  ?  ?  And of course, please contact our office with any concerns or questions [phone].  ?   AFTER YOUR CYSTOSCOPY        You have just completed a cystoscopy, or \"cysto\", which allowed your physician to learn more about your bladder (or to remove a stent placed after surgery). We suggest that you continue to avoid caffeine, fruit juice, and alcohol for the next 24 hours, however, you are encouraged to return to your normal activities.         A few things that are considered normal after your cystoscopy:     * Small amount of bleeding (or spotting) that clears within the next 24 hours     * Slight burning sensation with urination     * Sensation to of needing to avoid more frequently     * The feeling of \"air\" in your urine     * Mild discomfort that is relieved with Tylenol        Please contact our office promptly if you:     * Develop a fever above 101 degrees     * Are unable to urinate     * Develop bright red blood that does not stop     * Severe pain or swelling         Please contact " our office with any concerns or questions @[facility].

## 2023-12-13 NOTE — LETTER
12/13/2023       RE: Hansa Ortiz  [address]  [place] [address]     Dear Colleague,    Thank you for referring your patient, Hansa Ortiz, to the [facility] at [facility]. Please see a copy of my visit note below.    CYSTOSCOPY and URETERAL STENT REMOVAL PROCEDURE NOTE:    Hansa Ortiz is a 28 year old female who presents with ureteral stent  for a cystoscopy and ureteral stent removal.    Pt ID verified with patient: Yes     Procedure verified with patient: Yes     Procedure confirmed with physician and support staff: Yes     Consent confirmed with physician and support staff.    Sign In:  History and Physical Exam reviewed  Primary Diagnosis: ureteral stent   Informed Consent Discussed: Yes   Sign in Communication: Yes   Time Out:  Team Confirms the Correct Patient, Correct Procedure; Yes , Correct Site and Site Marking, Correct Position (if applicable).  Affirmation of Time Out: Yes   Sign Out:  Sign Out Discussion: Yes   Physician: Jethro Simpson MD    Hansa Ortiz is a 28 year old female with an indwelling ureteral stent in need of removal.      CYSTOSCOPY PROCEDURE:  After sterile preparation and draping of the patient,  a 17-Cuban flexible cystoscope was introduced via the urethra.  It was passed without difficulty into the bladder.  The urethra was open without evidence of stricture.  The ureteral orifices were orthotopic.  The double J stent was seen coming out the right side.  It was grasped with an alligator forceps and extracted intact without difficulty.  The patient tolerated the procedure well.    Stone Composition See Note    Comment: Calculi composed primarily of:  10% calcium oxalate monohydrate, and  90% calcium phosphate (hydroxy- and carbonate- apatite).       A/P Successful stent removal  Prophylactic antibiotic ordered   Stone prevention counseling provided today    Watch for any new  onset fevers, signs of UTI.  May expect some pain after removal.  If this is severe, or last many hours, you may need to return for replacement of stent.    Jethro Simpson MD   Urology  [facility]

## 2023-12-13 NOTE — NURSING NOTE
Chief Complaint   Patient presents with    HX UVJ STONE      Here in clinic for a cystoscopy and stent removal with DR LISA Herzog, CMA

## 2024-03-21 ENCOUNTER — OFFICE VISIT (OUTPATIENT)
Dept: OBGYN | Facility: CLINIC | Age: 29
End: 2024-03-21
Payer: COMMERCIAL

## 2024-03-21 VITALS
DIASTOLIC BLOOD PRESSURE: 60 MMHG | WEIGHT: 158 LBS | SYSTOLIC BLOOD PRESSURE: 104 MMHG | BODY MASS INDEX: 26.98 KG/M2 | HEIGHT: 64 IN

## 2024-03-21 DIAGNOSIS — R87.810 CERVICAL HIGH RISK HPV (HUMAN PAPILLOMAVIRUS) TEST POSITIVE: ICD-10-CM

## 2024-03-21 DIAGNOSIS — Z30.015 ENCOUNTER FOR INITIAL PRESCRIPTION OF VAGINAL RING HORMONAL CONTRACEPTIVE: ICD-10-CM

## 2024-03-21 DIAGNOSIS — Z01.419 ENCOUNTER FOR GYNECOLOGICAL EXAMINATION WITHOUT ABNORMAL FINDING: Primary | ICD-10-CM

## 2024-03-21 DIAGNOSIS — Z13.220 LIPID SCREENING: ICD-10-CM

## 2024-03-21 DIAGNOSIS — Z32.01 PREGNANCY TEST POSITIVE: ICD-10-CM

## 2024-03-21 LAB — HCG UR QL: NEGATIVE

## 2024-03-21 PROCEDURE — G0145 SCR C/V CYTO,THINLAYER,RESCR: HCPCS | Performed by: OBSTETRICS & GYNECOLOGY

## 2024-03-21 PROCEDURE — 87624 HPV HI-RISK TYP POOLED RSLT: CPT | Performed by: OBSTETRICS & GYNECOLOGY

## 2024-03-21 PROCEDURE — 81025 URINE PREGNANCY TEST: CPT | Performed by: OBSTETRICS & GYNECOLOGY

## 2024-03-21 PROCEDURE — 99395 PREV VISIT EST AGE 18-39: CPT | Performed by: OBSTETRICS & GYNECOLOGY

## 2024-03-21 RX ORDER — ETONOGESTREL AND ETHINYL ESTRADIOL VAGINAL RING .015; .12 MG/D; MG/D
1 RING VAGINAL
Qty: 3 EACH | Refills: 3 | Status: SHIPPED | OUTPATIENT
Start: 2024-03-21

## 2024-03-21 ASSESSMENT — ANXIETY QUESTIONNAIRES
3. WORRYING TOO MUCH ABOUT DIFFERENT THINGS: NOT AT ALL
GAD7 TOTAL SCORE: 0
6. BECOMING EASILY ANNOYED OR IRRITABLE: NOT AT ALL
GAD7 TOTAL SCORE: 0
1. FEELING NERVOUS, ANXIOUS, OR ON EDGE: NOT AT ALL
2. NOT BEING ABLE TO STOP OR CONTROL WORRYING: NOT AT ALL
5. BEING SO RESTLESS THAT IT IS HARD TO SIT STILL: NOT AT ALL
7. FEELING AFRAID AS IF SOMETHING AWFUL MIGHT HAPPEN: NOT AT ALL

## 2024-03-21 ASSESSMENT — PATIENT HEALTH QUESTIONNAIRE - PHQ9: 5. POOR APPETITE OR OVEREATING: NOT AT ALL

## 2024-03-21 NOTE — PROGRESS NOTES
Hansa is a 28 year old  female who presents for annual exam.     Besides routine health maintenance, she has no other health concerns today.    HPI:  The [facility]  Hansa presents for an annual exam. She is doing well without any health issues. She would like to have a UPT because of recent symptoms. She states that she only has bleeding when it is time to change the Nuva Ring. She likes it and feels like her prior breakthrough bleeding has improved. Her son is doing well but she reports that he was kicked out of  for behavioral issues. He is appropriate at home and has appropriate communication.      GYNECOLOGIC HISTORY:    No LMP recorded (lmp unknown).    Regular menses? No, using Nuvaring continuously, does endorse some breakthrough bleeding from time to time.  Menses every N/A days.  Length of menses: 4-5 days    Her current contraception method is: Nuvaring.  She  reports that she has never smoked. She has never used smokeless tobacco.    Patient is sexually active.  STD testing offered?  Declined    Last PHQ-9 score on record =       10/11/2022    10:45 AM   PHQ-9 SCORE   PHQ-9 Total Score 2     Last GAD7 score on record =       3/21/2024     2:09 PM   KIRTI-7 SCORE   Total Score 0     Alcohol Score = 0    HEALTH MAINTENANCE:    Overdue       Never done ADVANCE CARE PLANNING (Every 5 Years)     2008 HPV IMMUNIZATION (2 - 2-dose series)  Last completed: 2007     SEP 1   2023 INFLUENZA VACCINE (1)  Last completed: Dec 8, 2022     Never done COVID-19 Vaccine ( season)        Due Soon       MAY 4   2024 PAP FOLLOW-UP (Once)  Last completed: May 4, 2023     MAY 4   2024 HPV FOLLOW-UP (Once)  Last completed: May 4, 2023        Upcoming       AUG 17   2024 YEARLY PREVENTIVE VISIT (Yearly)   Last completed: Aug 17, 2023     SEKOU 22   2031 DTAP/TDAP/TD IMMUNIZATION (7 - Td or Tdap)  Last completed: 2021     Health maintenance  updated:  yes    HISTORY:  OB History    Para Term  AB Living   5 2 2 0 3 2   SAB IAB Ectopic Multiple Live Births   0 2 0 0 2      # Outcome Date GA Lbr Michael/2nd Weight Sex Delivery Anes PTL Lv   5 Term 21 40w2d 06:40 / 01:56 3.95 kg (8 lb 11.3 oz) M Vag-Vacuum EPI N INDU      Complications: Fetal Intolerance      Name: cincere      Apgar1: 7  Apgar5: 9   4 IAB            3 Term 12 38w0d  3.515 kg (7 lb 12 oz) F Vag-Spont  Y INDU   2 IAB            1 AB                Patient Active Problem List   Diagnosis    OSEI III (cervical intraepithelial neoplasia III)    Anxiety and depression    Family history of deep venous thrombosis    H/O LEEP    History of marijuana use    Maternal mental disorder, antepartum    Poor social situation    Situational stress    Flank pain     Past Surgical History:   Procedure Laterality Date    COMBINED CYSTOSCOPY, RETROGRADES, URETEROSCOPY, LASER HOLMIUM LITHOTRIPSY URETER(S), INSERT STENT Left 2023    Procedure: Cystoscopy and LEFT retrograde pyelogram and LEFT ureteroscopy with laser lithotripsy and  LEFT ureteroscopy with basketing of stones and LEFT JJ stent placement and  <1hr physician fluoroscopy time;  Surgeon: Jethro Simpson MD;  Location: [facility] OR    LEEP TX, CERVICAL  2019    OPERATIVE HYSTEROSCOPY WITH MORCELLATOR N/A 2022    Procedure: OPERATIVE HYSTEROSCOPY WITH MORCELLATOR (MYOSURE);  Surgeon: Rayna Noel MD;  Location:  OR      Social History     Tobacco Use    Smoking status: Never    Smokeless tobacco: Never   Substance Use Topics    Alcohol use: Yes     Comment: wine 1-2x/ week      Problem (# of Occurrences) Relation (Name,Age of Onset)    Clotting Disorder (1) Mother              Current Outpatient Medications   Medication Sig    acetaminophen (TYLENOL) 325 MG tablet Take 2 tablets (650 mg) by mouth every 4 hours as needed for mild pain    cyclobenzaprine (FLEXERIL) 10 MG tablet Take 10 mg by mouth     "docusate sodium (COLACE) 100 MG tablet Take 1 tablet (100 mg) by mouth daily    etonogestrel-ethinyl estradiol (NUVARING) 0.12-0.015 MG/24HR vaginal ring Place 1 each vaginally every 28 days    ibuprofen (ADVIL/MOTRIN) 600 MG tablet Take 1 tablet (600 mg) by mouth every 6 hours as needed for moderate pain     No current facility-administered medications for this visit.     No Known Allergies    Past medical, surgical, social and family histories were reviewed and updated in EPIC.    ROS:   No urinary frequency or dysuria, bladder or kidney problems    EXAM:  /60   Ht 1.619 m (5' 3.75\")   Wt 71.7 kg (158 lb)   LMP  (LMP Unknown)   BMI 27.33 kg/m     BMI: Body mass index is 27.33 kg/m .    PHYSICAL EXAM:  Constitutional:   Appearance: Well nourished, well developed, alert, in no acute distress  Neck:  Lymph Nodes:  No lymphadenopathy present    Thyroid:  Gland size normal, nontender, no nodules or masses present  on palpation  Chest:  Respiratory Effort:  Breathing unlabored  Cardiovascular:    Heart: Auscultation:  Regular rate, normal rhythm, no murmurs present  Breasts: Inspection of Breasts:  No lymphadenopathy present., Palpation of Breasts and Axillae:  No masses present on palpation, no breast tenderness., Axillary Lymph Nodes:  No lymphadenopathy present., and No nodularity, asymmetry or nipple discharge bilaterally.  Gastrointestinal:   Abdominal Examination:  Abdomen nontender to palpation, tone normal without rigidity or guarding, no masses present, umbilicus without lesions   Liver and Spleen:  No hepatomegaly present, liver nontender to palpation    Hernias:  No hernias present  Lymphatic: Lymph Nodes:  No other lymphadenopathy present  Skin:  General Inspection:  No rashes present, no lesions present, no areas of  discoloration  Neurologic:    Mental Status:  Oriented X3.  Normal strength and tone, sensory exam                grossly normal, mentation intact and speech normal.    Psychiatric:   " Mentation appears normal and affect normal/bright.         Pelvic Exam:  External Genitalia:     Normal appearance for age, no discharge present, no tenderness present, no inflammatory lesions present, color normal  Vagina:     Normal vaginal vault without central or paravaginal defects, no discharge present, no inflammatory lesions present, no masses present  Bladder:     Nontender to palpation  Urethra:   Urethral Body:  Urethra palpation normal, urethra structural support normal   Urethral Meatus:  No erythema or lesions present  Cervix:     Appearance healthy, no lesions present, nontender to palpation, no bleeding present  Uterus:     Uterus: firm, normal sized and nontender, anteverted in position.   Adnexa:     No adnexal tenderness present, no adnexal masses present  Perineum:     Perineum within normal limits, no evidence of trauma, no rashes or skin lesions present  Anus:     Anus within normal limits, no hemorrhoids present  Inguinal Lymph Nodes:     No lymphadenopathy present  Pubic Hair:     Normal pubic hair distribution for age  Genitalia and Groin:     No rashes present, no lesions present, no areas of discoloration, no masses present    COUNSELING:   Reviewed preventive health counseling, as reflected in patient instructions    BMI: Body mass index is 27.33 kg/m .      ASSESSMENT:  28 year old female with satisfactory annual exam.    ICD-10-CM    1. Encounter for gynecological examination without abnormal finding  Z01.419       2. Cervical high risk HPV (human papillomavirus) test positive  R87.810 Pap screen with HPV - recommended age 30 - 65 years      3. Encounter for initial prescription of vaginal ring hormonal contraceptive  Z30.015 etonogestrel-ethinyl estradiol (NUVARING) 0.12-0.015 MG/24HR vaginal ring      4. Pregnancy test positive  Z32.01 HCG qualitative urine     HCG qualitative urine      5. Lipid screening  Z13.220 Lipid Profile (Chol, Trig, HDL, LDL calc)          PLAN:  Normal  breast and pelvic exam  UPT per request  Ok to continue on Nuva Ring.  Screening Lipids today.    Rayna Noel MD

## 2024-04-01 PROBLEM — D06.9 CIN III (CERVICAL INTRAEPITHELIAL NEOPLASIA III): Status: ACTIVE | Noted: 2019-01-03

## 2024-12-29 ENCOUNTER — HOSPITAL ENCOUNTER (EMERGENCY)
Facility: CLINIC | Age: 29
Discharge: HOME | End: 2024-12-29
Attending: STUDENT IN AN ORGANIZED HEALTH CARE EDUCATION/TRAINING PROGRAM
Payer: COMMERCIAL

## 2024-12-29 VITALS
SYSTOLIC BLOOD PRESSURE: 127 MMHG | HEART RATE: 69 BPM | BODY MASS INDEX: 25.39 KG/M2 | WEIGHT: 158 LBS | RESPIRATION RATE: 16 BRPM | OXYGEN SATURATION: 99 % | DIASTOLIC BLOOD PRESSURE: 83 MMHG | HEIGHT: 66 IN | TEMPERATURE: 97.6 F

## 2024-12-29 DIAGNOSIS — R19.7 NAUSEA VOMITING AND DIARRHEA: ICD-10-CM

## 2024-12-29 DIAGNOSIS — R11.2 NAUSEA VOMITING AND DIARRHEA: ICD-10-CM

## 2024-12-29 LAB
ALBUMIN SERPL BCG-MCNC: 4.8 G/DL (ref 3.5–5.2)
ALP SERPL-CCNC: 33 U/L (ref 40–150)
ALT SERPL W P-5'-P-CCNC: 23 U/L (ref 0–50)
ANION GAP SERPL CALCULATED.3IONS-SCNC: 21 MMOL/L (ref 7–15)
AST SERPL W P-5'-P-CCNC: 24 U/L (ref 0–45)
B-OH-BUTYR SERPL-SCNC: 0.41 MMOL/L
BASOPHILS # BLD AUTO: 0 10E3/UL (ref 0–0.2)
BASOPHILS NFR BLD AUTO: 0 %
BILIRUB SERPL-MCNC: 0.6 MG/DL
BUN SERPL-MCNC: 7.9 MG/DL (ref 6–20)
CALCIUM SERPL-MCNC: 9.7 MG/DL (ref 8.8–10.4)
CHLORIDE SERPL-SCNC: 103 MMOL/L (ref 98–107)
CREAT SERPL-MCNC: 0.58 MG/DL (ref 0.51–0.95)
EGFRCR SERPLBLD CKD-EPI 2021: >90 ML/MIN/1.73M2
EOSINOPHIL # BLD AUTO: 0 10E3/UL (ref 0–0.7)
EOSINOPHIL NFR BLD AUTO: 0 %
ERYTHROCYTE [DISTWIDTH] IN BLOOD BY AUTOMATED COUNT: 12.7 % (ref 10–15)
GLUCOSE SERPL-MCNC: 134 MG/DL (ref 70–99)
HCO3 SERPL-SCNC: 16 MMOL/L (ref 22–29)
HCT VFR BLD AUTO: 38.4 % (ref 35–47)
HGB BLD-MCNC: 13 G/DL (ref 11.7–15.7)
IMM GRANULOCYTES # BLD: 0.1 10E3/UL
IMM GRANULOCYTES NFR BLD: 1 %
LIPASE SERPL-CCNC: 11 U/L (ref 13–60)
LYMPHOCYTES # BLD AUTO: 1.3 10E3/UL (ref 0.8–5.3)
LYMPHOCYTES NFR BLD AUTO: 13 %
MCH RBC QN AUTO: 30 PG (ref 26.5–33)
MCHC RBC AUTO-ENTMCNC: 33.9 G/DL (ref 31.5–36.5)
MCV RBC AUTO: 89 FL (ref 78–100)
MONOCYTES # BLD AUTO: 0.3 10E3/UL (ref 0–1.3)
MONOCYTES NFR BLD AUTO: 3 %
NEUTROPHILS # BLD AUTO: 8.1 10E3/UL (ref 1.6–8.3)
NEUTROPHILS NFR BLD AUTO: 83 %
NRBC # BLD AUTO: 0 10E3/UL
NRBC BLD AUTO-RTO: 0 /100
PLATELET # BLD AUTO: 315 10E3/UL (ref 150–450)
POTASSIUM SERPL-SCNC: 3.9 MMOL/L (ref 3.4–5.3)
PROT SERPL-MCNC: 8.3 G/DL (ref 6.4–8.3)
RBC # BLD AUTO: 4.34 10E6/UL (ref 3.8–5.2)
SODIUM SERPL-SCNC: 140 MMOL/L (ref 135–145)
WBC # BLD AUTO: 9.8 10E3/UL (ref 4–11)

## 2024-12-29 PROCEDURE — 258N000003 HC RX IP 258 OP 636: Performed by: STUDENT IN AN ORGANIZED HEALTH CARE EDUCATION/TRAINING PROGRAM

## 2024-12-29 PROCEDURE — 82435 ASSAY OF BLOOD CHLORIDE: CPT | Performed by: STUDENT IN AN ORGANIZED HEALTH CARE EDUCATION/TRAINING PROGRAM

## 2024-12-29 PROCEDURE — 36415 COLL VENOUS BLD VENIPUNCTURE: CPT | Performed by: STUDENT IN AN ORGANIZED HEALTH CARE EDUCATION/TRAINING PROGRAM

## 2024-12-29 PROCEDURE — 99284 EMERGENCY DEPT VISIT MOD MDM: CPT | Mod: 25

## 2024-12-29 PROCEDURE — 96374 THER/PROPH/DIAG INJ IV PUSH: CPT

## 2024-12-29 PROCEDURE — 82010 KETONE BODYS QUAN: CPT | Performed by: STUDENT IN AN ORGANIZED HEALTH CARE EDUCATION/TRAINING PROGRAM

## 2024-12-29 PROCEDURE — 84155 ASSAY OF PROTEIN SERUM: CPT | Performed by: STUDENT IN AN ORGANIZED HEALTH CARE EDUCATION/TRAINING PROGRAM

## 2024-12-29 PROCEDURE — 83690 ASSAY OF LIPASE: CPT | Performed by: STUDENT IN AN ORGANIZED HEALTH CARE EDUCATION/TRAINING PROGRAM

## 2024-12-29 PROCEDURE — 85018 HEMOGLOBIN: CPT | Performed by: STUDENT IN AN ORGANIZED HEALTH CARE EDUCATION/TRAINING PROGRAM

## 2024-12-29 PROCEDURE — 250N000011 HC RX IP 250 OP 636: Performed by: STUDENT IN AN ORGANIZED HEALTH CARE EDUCATION/TRAINING PROGRAM

## 2024-12-29 PROCEDURE — 250N000011 HC RX IP 250 OP 636: Performed by: EMERGENCY MEDICINE

## 2024-12-29 PROCEDURE — 85004 AUTOMATED DIFF WBC COUNT: CPT | Performed by: STUDENT IN AN ORGANIZED HEALTH CARE EDUCATION/TRAINING PROGRAM

## 2024-12-29 PROCEDURE — 96361 HYDRATE IV INFUSION ADD-ON: CPT

## 2024-12-29 PROCEDURE — 96372 THER/PROPH/DIAG INJ SC/IM: CPT | Performed by: STUDENT IN AN ORGANIZED HEALTH CARE EDUCATION/TRAINING PROGRAM

## 2024-12-29 RX ORDER — ONDANSETRON 4 MG/1
4 TABLET, ORALLY DISINTEGRATING ORAL EVERY 8 HOURS PRN
Qty: 15 TABLET | Refills: 0 | Status: SHIPPED | OUTPATIENT
Start: 2024-12-29

## 2024-12-29 RX ORDER — METOCLOPRAMIDE HYDROCHLORIDE 5 MG/ML
10 INJECTION INTRAMUSCULAR; INTRAVENOUS ONCE
Status: COMPLETED | OUTPATIENT
Start: 2024-12-29 | End: 2024-12-29

## 2024-12-29 RX ORDER — KETOROLAC TROMETHAMINE 15 MG/ML
15 INJECTION, SOLUTION INTRAMUSCULAR; INTRAVENOUS ONCE
Status: COMPLETED | OUTPATIENT
Start: 2024-12-29 | End: 2024-12-29

## 2024-12-29 RX ORDER — ONDANSETRON 4 MG/1
4 TABLET, ORALLY DISINTEGRATING ORAL ONCE
Status: COMPLETED | OUTPATIENT
Start: 2024-12-29 | End: 2024-12-29

## 2024-12-29 RX ADMIN — METOCLOPRAMIDE 10 MG: 5 INJECTION, SOLUTION INTRAMUSCULAR; INTRAVENOUS at 19:44

## 2024-12-29 RX ADMIN — ONDANSETRON 4 MG: 4 TABLET, ORALLY DISINTEGRATING ORAL at 13:53

## 2024-12-29 RX ADMIN — KETOROLAC TROMETHAMINE 15 MG: 15 INJECTION, SOLUTION INTRAMUSCULAR; INTRAVENOUS at 19:17

## 2024-12-29 RX ADMIN — SODIUM CHLORIDE 500 ML: 9 INJECTION, SOLUTION INTRAVENOUS at 19:45

## 2024-12-29 RX ADMIN — ONDANSETRON 4 MG: 4 TABLET, ORALLY DISINTEGRATING ORAL at 18:51

## 2024-12-29 ASSESSMENT — ACTIVITIES OF DAILY LIVING (ADL)
ADLS_ACUITY_SCORE: 53

## 2024-12-30 NOTE — ED PROVIDER NOTES
"  Emergency Department Note      History of Present Illness     Chief Complaint   Nausea, Vomiting, & Diarrhea      HPI   Hansa Ortiz is a 29 year old female with a history of OSEI III who presents to the ED for an evaluation of nausea, vomiting, and diarrhea. The patient reports that at 0900 she developed a sudden onset of nausea, vomiting, and diarrhea. She also reports some upper abdominal pain from her vomiting. She states that as far she knows, no one else around her is sick.     Independent Historian   None    Review of External Notes   Reviewed ED note from 11/13/24 where patient was seen for hyperemesis cannabinoid syndrome.     Past Medical History     Medical History and Problem List   Abnormal uterine bleeding  OSEI III  Trichomonas infection    Medications   Flexeril  Colace  Nuvaring    Surgical History   Cystoscopy, retrogrades, ureteroscopy, laser holmium lithotripsy ureter, insert stent  Hysteroscopy    Physical Exam     Patient Vitals for the past 24 hrs:   BP Temp Temp src Pulse Resp SpO2 Height Weight   12/29/24 1852 127/83 -- -- 69 -- 99 % -- --   12/29/24 1344 126/79 97.6  F (36.4  C) Oral 61 16 99 % 1.676 m (5' 6\") 71.7 kg (158 lb)     Physical Exam  General: Alert and cooperative with exam. Patient in no apparent distress. Normal mentation. Dry heaving at times.   Head:  Scalp is NC/AT  Eyes:  No scleral icterus, PERRL  ENT:  The external nose and ears are normal. The oropharynx is normal and without erythema; mucus membranes are moist. Uvula midline, no evidence of deep space infection.  Neck:  Normal range of motion without rigidity.  CV:  Regular rate and rhythm    No pathologic murmur   Resp:  Breath sounds are clear bilaterally    Non-labored, no retractions or accessory muscle use  GI:  Abdomen is soft, no distension, no tenderness. No peritoneal signs  MS:  No lower extremity edema   Skin:  Warm and dry, No rash or lesions noted.  Neuro:  Oriented x 3. No gross motor " deficits.      Diagnostics     Lab Results   Labs Ordered and Resulted from Time of ED Arrival to Time of ED Departure   COMPREHENSIVE METABOLIC PANEL - Abnormal       Result Value    Sodium 140      Potassium 3.9      Carbon Dioxide (CO2) 16 (*)     Anion Gap 21 (*)     Urea Nitrogen 7.9      Creatinine 0.58      GFR Estimate >90      Calcium 9.7      Chloride 103      Glucose 134 (*)     Alkaline Phosphatase 33 (*)     AST 24      ALT 23      Protein Total 8.3      Albumin 4.8      Bilirubin Total 0.6     LIPASE - Abnormal    Lipase 11 (*)    KETONE BETA-HYDROXYBUTYRATE QUANTITATIVE, RAPID - Abnormal    Ketone (Beta-Hydroxybutyrate) Quantitative 0.41 (*)    CBC WITH PLATELETS AND DIFFERENTIAL    WBC Count 9.8      RBC Count 4.34      Hemoglobin 13.0      Hematocrit 38.4      MCV 89      MCH 30.0      MCHC 33.9      RDW 12.7      Platelet Count 315      % Neutrophils 83      % Lymphocytes 13      % Monocytes 3      % Eosinophils 0      % Basophils 0      % Immature Granulocytes 1      NRBCs per 100 WBC 0      Absolute Neutrophils 8.1      Absolute Lymphocytes 1.3      Absolute Monocytes 0.3      Absolute Eosinophils 0.0      Absolute Basophils 0.0      Absolute Immature Granulocytes 0.1      Absolute NRBCs 0.0           ED Course      Medications Administered   Medications   ondansetron (ZOFRAN ODT) ODT tab 4 mg (4 mg Oral $Given 12/29/24 1353)   ondansetron (ZOFRAN ODT) ODT tab 4 mg (4 mg Oral $Given 12/29/24 1851)   ketorolac (TORADOL) injection 15 mg (15 mg Intramuscular $Given 12/29/24 1917)   metoclopramide (REGLAN) injection 10 mg (10 mg Intravenous $Given 12/29/24 1944)   sodium chloride 0.9% BOLUS 500 mL (0 mLs Intravenous Stopped 12/29/24 2035)       Procedures   Procedures     Discussion of Management   None    ED Course   ED Course as of 12/29/24 2103   Sun Dec 29, 2024   1843 I obtained history and examined the patient as noted above.         Additional Documentation  Social Determinants of Health:  None    Medical Decision Making / Diagnosis     CMS Diagnoses: None    MIPS       None    MDM   Hansa Ortiz is a 29 year old female who presents with nausea, vomiting, and diarrhea.  Patient does have history of cannabinoid hyperemesis syndrome but states that the symptoms feel different as she typically does not have diarrhea.  On exam, she is dry heaving at times and endorses some epigastric pain but does not have any tenderness to palpation throughout entirety of abdomen.  Low suspicion for intra-abdominal catastrophe.  Zofran was provided ODT however she did unfortunately threw this up.  IV was placed and she was provided Toradol as well as Zofran.  Basic labs obtained show concern for possible starvation ketosis with slight ketone elevation along with anion gap elevation.  No other significant abnormalities present.  On reassessment, patient is feeling much improved.  I do not feel that imaging is indicated given the reassuring abdominal exam and patient is in agreement with this.  I suspect she is likely having CHS exacerbation versus viral gastroenteritis contributing to symptoms.  Recommend she continue to increase hydration and rest and continue antiemetics at home.  Rx for Zofran provided at time of discharge.  Encourage patient to follow-up with her primary care provider within the week for reassessment to ensure that symptoms are improving.  No indication for stool studies at this time.  If she develops any worsening symptoms, return to ER.    Disposition   The patient was discharged.     Diagnosis     ICD-10-CM    1. Nausea vomiting and diarrhea  R11.2     R19.7            Discharge Medications   Discharge Medication List as of 12/29/2024  8:35 PM        START taking these medications    Details   ondansetron (ZOFRAN ODT) 4 MG ODT tab Take 1 tablet (4 mg) by mouth every 8 hours as needed for nausea., Disp-15 tablet, R-0, Local Print               Scribe Disclosure:  ICaridad, am serving as  a scribe at 6:36 PM on 12/29/2024 to document services personally performed by Melina Acevedo PA-C based on my observations and the provider's statements to me.        Melina Acevedo PA-C  12/29/24 2103

## 2024-12-30 NOTE — DISCHARGE INSTRUCTIONS
Thankfully all labs are reassuring.  Please try to increase hydration and rest at home.  Rx for Zofran sent to pharmacy to help with ongoing nausea.  There is a viral stomach bug going around that he may have caught.  Symptoms generally resolve in a few days.  Try to increase hydration in the meantime.  Continue ibuprofen and Tylenol for any abdominal discomfort.  Return to ER for worsening symptoms.

## 2025-02-13 ENCOUNTER — TELEPHONE (OUTPATIENT)
Dept: OBGYN | Facility: CLINIC | Age: 30
End: 2025-02-13
Payer: COMMERCIAL

## 2025-02-13 NOTE — TELEPHONE ENCOUNTER
Called and spoke to pt    Requesting records from 2023 for her flu vaccine, quantiferon gold varicella testing    RN able to see all testing in pt records, pt having difficulty finding results    Offered to send results to pts email or we can print results and have her  at .    Talked with pt on how to access test results on [employer], as she should be able to see them there as well    Recommend pt go to sougou and/or download the [facility] rainer to see all her [place] immunizations in one place    Pt was able to see her test results in [facility] In[facility], no longer needs to have records sent to her email.    Knows to call with any further questions or concerns    Lillie Lancaster, RN on 2/13/2025 at 1:40 PM  [facility] Triage

## 2025-02-13 NOTE — TELEPHONE ENCOUNTER
[facility] Call Center    Phone Message    May a detailed message be left on voicemail: yes     Reason for Call: Other: Pt is requesting a call back to discuss some immunizations that are missing in her chart.      Pt states she is missing the flu shot, varicella and quantiferon gold.    Please call back to advise at # [phone]    Action Taken: Message routed to:  Other: [facility]    Travel Screening: Not Applicable     Date of Service:

## 2025-03-07 NOTE — PROGRESS NOTES
[facility]  OB/GYN Clinic Note    SUBJECTIVE:                                                   Hansa Ortiz is a 29 year old female who presents to clinic today for the following health issue(s):  Patient presents with:  Consult: Birth control consult, been on nuvaring for last 2 years, has periods about every other month, has a day of spotting or when wiping.  Only lasts couple days    Additional information: birth control consult currently using nuvaring, would like to switch to nexplanon or IUD    HPI:  Using Nuvaring for two years. Doing okay. Sometimes spots with it. When its time to change it, is when it happens. Last two three days. Used to get heavy periods.    Was on Depo Provera prior for 7-8 years and had spotting. Does not want to take a pill daily.   Did not want Nexplanon. Knows two people who had to have IUD surgically removed.   No changes to healthy history. Denies migraine HA, HTN, blood clots.     Patient's last menstrual period was 2025..   Patient is sexually active, .  Using nuvaring  for contraception.    reports that she has never smoked. She has never used smokeless tobacco.  STD testing offered?  Declined  Health maintenance updated:  yes    Today's PHQ-2 Score:       3/20/2025     9:38 AM   PHQ-2 (  [employer])   Q1: Little interest or pleasure in doing things 0   Q2: Feeling down, depressed or hopeless 0   PHQ-2 Score 0         Problem list and histories reviewed & adjusted, as indicated.  Additional history: as documented.    Patient Active Problem List   Diagnosis    OSEI III (cervical intraepithelial neoplasia III)    Anxiety and depression    Family history of deep venous thrombosis    H/O LEEP    History of marijuana use    Maternal mental disorder, antepartum    Poor social situation    Situational stress    Flank pain     Past Surgical History:   Procedure Laterality Date    COMBINED CYSTOSCOPY, RETROGRADES, URETEROSCOPY, LASER HOLMIUM  LITHOTRIPSY URETER(S), INSERT STENT Left 12/2/2023    Procedure: Cystoscopy and LEFT retrograde pyelogram and LEFT ureteroscopy with laser lithotripsy and  LEFT ureteroscopy with basketing of stones and LEFT JJ stent placement and  <1hr physician fluoroscopy time;  Surgeon: Jethro Simpson MD;  Location: [facility] OR    LEEP TX, CERVICAL  01/01/2019    OPERATIVE HYSTEROSCOPY WITH MORCELLATOR N/A 9/7/2022    Procedure: OPERATIVE HYSTEROSCOPY WITH MORCELLATOR (MYOSURE);  Surgeon: Rayna Noel MD;  Location:  OR      Social History     Tobacco Use    Smoking status: Never    Smokeless tobacco: Never   Substance Use Topics    Alcohol use: Yes     Comment: wine 1-2x/ week      Problem (# of Occurrences) Relation (Name,Age of Onset)    Clotting Disorder (1) Mother              Current Outpatient Medications   Medication Sig Dispense Refill    acetaminophen (TYLENOL) 325 MG tablet Take 2 tablets (650 mg) by mouth every 4 hours as needed for mild pain 60 tablet 0    cyclobenzaprine (FLEXERIL) 10 MG tablet Take 10 mg by mouth      Dermatological Products, Misc. (CERTAIN DRI EVERYDAY STRENGTH) 20 % STCK Externally apply 1 Application topically at bedtime. 74 g 11    docusate sodium (COLACE) 100 MG tablet Take 1 tablet (100 mg) by mouth daily 60 tablet 1    glycopyrrolate (GLYCATE) 2 MG tablet Start 1 tablet PO once daily. Then increase dose gradually to max dose of 3 mg daily (1.5 tablets). 90 tablet 2    ibuprofen (ADVIL/MOTRIN) 600 MG tablet Take 1 tablet (600 mg) by mouth every 6 hours as needed for moderate pain 30 tablet 0    NUVARING 0.12-0.015 MG/24HR vaginal ring Insert one (1) ring vaginally and leave in place for 3 consecutive weeks (21 days), then remove for 1 week. 3 each 4    ondansetron (ZOFRAN ODT) 4 MG ODT tab Take 1 tablet (4 mg) by mouth every 8 hours as needed for nausea. 15 tablet 0     No current facility-administered medications for this visit.     No Known Allergies      OBJECTIVE:     BP  "116/72   Ht 1.676 m (5' 6\")   Wt 73.2 kg (161 lb 6.4 oz)   LMP 03/21/2025   BMI 26.05 kg/m    Body mass index is 26.05 kg/m .    Exam:  Constitutional:  Appearance: Well nourished, well developed alert, in no acute distress         ASSESSMENT/PLAN:                                                        ICD-10-CM    1. Breakthrough bleeding with NuvaRing  N92.1     Z97.5       2. Uses vaginal contraceptive ring  Z78.9 NUVARING 0.12-0.015 MG/24HR vaginal ring        Hansa Ortiz is a 29 year old with breakthrough bleeding on continuous Nuvaring. No changes to health history or contraindications to use. Discussed removing Nuvaring at 3 weeks to induce menses every 3-4 months to reduce breakthrough bleeding.   Rx provided for Nuvaring brand- had issues with generic in the past.     Follow-up if BTB continues or if wanting to discuss alternative, otherwise in one year for refill.         Jennifer Gee MD, MHS  [facility]  03/25/25     "

## 2025-03-20 ENCOUNTER — OFFICE VISIT (OUTPATIENT)
Dept: DERMATOLOGY | Facility: CLINIC | Age: 30
End: 2025-03-20
Payer: COMMERCIAL

## 2025-03-20 DIAGNOSIS — L74.519 HYPERHIDROSIS, FOCAL, PRIMARY: ICD-10-CM

## 2025-03-20 DIAGNOSIS — L74.519 HYPERHIDROSIS, FOCAL, PRIMARY: Primary | ICD-10-CM

## 2025-03-20 RX ORDER — GLYCOPYRROLATE 2 MG/1
TABLET ORAL
Qty: 90 TABLET | Refills: 2 | Status: SHIPPED | OUTPATIENT
Start: 2025-03-20

## 2025-03-20 NOTE — PROGRESS NOTES
[facility] Dermatology Note  Encounter Date: Mar 20, 2025  Office Visit      Dermatology Problem List:  1. Hyperhidrosis  - Tx: Glycopyrrolate 3mg + Certain Dri  - past: clinical strength Deana Lume, oxybutynin 5mg, glycopyrrolate 1mg  ____________________________________________    Assessment & Plan:  # Primary Focal Hyperhidrosis - axillae, hands/palms, scalp,face, chest/breasts  - Discussed the etiology of this condition as well as treatment and their side effects  - Start on glycopyrrolate, Start 1 tablet PO once daily. Then increase dose gradually to max dose of 3 mg daily   - Start on Certain dri, apply at night to affected areas  - struggles with time management and adhering to schedule/routine so nightly application is better for her. Did not find improvement with 1mg robinul or 5mg ditropan. Will triam increased robinul dose. Would not be a good candidate for botox given hyperhidrosis is so diffuse  - minimize triggers when able - feels increased sweating with anxiety/nerves and in summer as well as at work  - future: propranolol prn for situational anxiety  - Follow up 3 months to check for improvement.     Procedures Performed:   None     Follow-up: 3 month(s) in-person, or earlier for new or changing lesions    Staff and scribe:     Scribe Disclosure:   I, TERRA SORENSON, am serving as a scribe; to document services personally performed by Milagro Diamond PA-C -based on data collection and the provider's statements to me.     Provider Disclosure:  I agree with above History, Review of Systems, Physical exam and Plan.  I have reviewed the content of the documentation and have edited it as needed. I have personally performed the services documented here and the documentation accurately represents those services and the decisions I have made.      Electronically signed by:      All risks, benefits and alternatives were discussed with patient.  Patient is in agreement and  understands the assessment and plan.  All questions were answered.    Milagro Diamond PA-C, MPAS  [facility] [place]: Phone: [phone], Fax: [phone]  [facility]: Phone: [phone],  Fax: [phone]  [facility]: Phone: [phone], Fax: [phone]  ____________________________________________    CC: Consult (hyperhidrosis)      Reviewed patients past medical history and pertinent chart review prior to patient's visit today.     HPI:  Ms. Hansa Ortiz is a 29 year old female who presents today as a new patient for for a hyperhidrosis. She reports that she experiences hyperhidrosis on her hands, neck, buttocks.     Usually flares with stress or when she is nervous. Can flare even with just getting in her car.     Has been trying to lose weight since she has possible noticed a relation to maybe her weight.     Her mother also has a hx of hyperhidrosis.     Has a tried glycopyrrolate with some improvement.     Has not tried use of alluminum chloride.      Patient is otherwise feeling well, without additional concerns.    Labs:  N/A    Physical Exam:  Vitals: There were no vitals taken for this visit.  SKIN: Sun-exposed skin, which includes the head/face, neck, both arms, digits, and/or nails was examined.    - no active sweating noticed. No lesions of concern.    - No other lesions of concern on areas examined.     Medications:  Current Outpatient Medications   Medication Sig Dispense Refill    acetaminophen (TYLENOL) 325 MG tablet Take 2 tablets (650 mg) by mouth every 4 hours as needed for mild pain 60 tablet 0    cyclobenzaprine (FLEXERIL) 10 MG tablet Take 10 mg by mouth      docusate sodium (COLACE) 100 MG tablet Take 1 tablet (100 mg) by mouth daily 60 tablet 1    etonogestrel-ethinyl estradiol (NUVARING) 0.12-0.015 MG/24HR vaginal ring Place 1 each vaginally every 28 days 3 each 3    ibuprofen  (ADVIL/MOTRIN) 600 MG tablet Take 1 tablet (600 mg) by mouth every 6 hours as needed for moderate pain 30 tablet 0    ondansetron (ZOFRAN ODT) 4 MG ODT tab Take 1 tablet (4 mg) by mouth every 8 hours as needed for nausea. 15 tablet 0     No current facility-administered medications for this visit.      Past Medical/Surgical History:   Patient Active Problem List   Diagnosis    OSEI III (cervical intraepithelial neoplasia III)    Anxiety and depression    Family history of deep venous thrombosis    H/O LEEP    History of marijuana use    Maternal mental disorder, antepartum    Poor social situation    Situational stress    Flank pain     Past Medical History:   Diagnosis Date    Abnormal uterine bleeding     Back pain     [place].  Has on going mid back pain    OSEI III (cervical intraepithelial neoplasia III) 01/03/2019 8/27/14 ASCUS 9/7/16 ASCUS 10/5/17 ASCUS 11/8/18 HSIL 11/27/18 Colpo ECC OSEI II, bx cervicitis 1/3/19 LEEP OSEI II-III completely excised, ECC neg 1/14/20 NIL pap, neg HPV 5/25/21 NIL pap, +HR HPV (not 16/18). Plan: colposcopy due before 8/25/21    Hx of abnormal cervical Pap smear     Trichomonas infection 01/2020    treated

## 2025-03-20 NOTE — LETTER
3/20/2025      Hansa Ortiz  [address]  [place] [address]      Dear Colleague,    Thank you for referring your patient, Hansa Ortiz, to the [facility]. Please see a copy of my visit note below.    [facility] Dermatology Note  Encounter Date: Mar 20, 2025  Office Visit      Dermatology Problem List:  1. Hyperhidrosis  - Tx: Glycopyrrolate 3mg + Certain Dri  - past: clinical strength Deoderant, Lume, oxybutynin 5mg, glycopyrrolate 1mg  ____________________________________________    Assessment & Plan:  # Primary Focal Hyperhidrosis - axillae, hands/palms, scalp,face, chest/breasts  - Discussed the etiology of this condition as well as treatment and their side effects  - Start on glycopyrrolate, Start 1 tablet PO once daily. Then increase dose gradually to max dose of 3 mg daily   - Start on Certain dri, apply at night to affected areas  - struggles with time management and adhering to schedule/routine so nightly application is better for her. Did not find improvement with 1mg robinul or 5mg ditropan. Will triam increased robinul dose. Would not be a good candidate for botox given hyperhidrosis is so diffuse  - minimize triggers when able - feels increased sweating with anxiety/nerves and in summer as well as at work  - future: propranolol prn for situational anxiety  - Follow up 3 months to check for improvement.     Procedures Performed:   None     Follow-up: 3 month(s) in-person, or earlier for new or changing lesions    Staff and scribe:     Scribe Disclosure:   I, TERRA SORENSON, am serving as a scribe; to document services personally performed by Milagro Diamond PA-C -based on data collection and the provider's statements to me.     Provider Disclosure:  I agree with above History, Review of Systems, Physical exam and Plan.  I have reviewed the content of the documentation and have edited it as needed. I have personally performed the  services documented here and the documentation accurately represents those services and the decisions I have made.      Electronically signed by:      All risks, benefits and alternatives were discussed with patient.  Patient is in agreement and understands the assessment and plan.  All questions were answered.    Milagro Diamond PA-C, MPAS  [facility] [place]: Phone: [phone], Fax: [phone]  [facility]: Phone: [phone],  Fax: [phone]  [facility]: Phone: [phone], Fax: [phone]  ____________________________________________    CC: Consult (hyperhidrosis)      Reviewed patients past medical history and pertinent chart review prior to patient's visit today.     HPI:  Ms. Hansa Ortiz is a 29 year old female who presents today as a new patient for for a hyperhidrosis. She reports that she experiences hyperhidrosis on her hands, neck, buttocks.     Usually flares with stress or when she is nervous. Can flare even with just getting in her car.     Has been trying to lose weight since she has possible noticed a relation to maybe her weight.     Her mother also has a hx of hyperhidrosis.     Has a tried glycopyrrolate with some improvement.     Has not tried use of alluminum chloride.      Patient is otherwise feeling well, without additional concerns.    Labs:  N/A    Physical Exam:  Vitals: There were no vitals taken for this visit.  SKIN: Sun-exposed skin, which includes the head/face, neck, both arms, digits, and/or nails was examined.    - no active sweating noticed. No lesions of concern.    - No other lesions of concern on areas examined.     Medications:  Current Outpatient Medications   Medication Sig Dispense Refill     acetaminophen (TYLENOL) 325 MG tablet Take 2 tablets (650 mg) by mouth every 4 hours as needed for mild pain 60 tablet 0     cyclobenzaprine (FLEXERIL) 10 MG tablet  Take 10 mg by mouth       docusate sodium (COLACE) 100 MG tablet Take 1 tablet (100 mg) by mouth daily 60 tablet 1     etonogestrel-ethinyl estradiol (NUVARING) 0.12-0.015 MG/24HR vaginal ring Place 1 each vaginally every 28 days 3 each 3     ibuprofen (ADVIL/MOTRIN) 600 MG tablet Take 1 tablet (600 mg) by mouth every 6 hours as needed for moderate pain 30 tablet 0     ondansetron (ZOFRAN ODT) 4 MG ODT tab Take 1 tablet (4 mg) by mouth every 8 hours as needed for nausea. 15 tablet 0     No current facility-administered medications for this visit.      Past Medical/Surgical History:   Patient Active Problem List   Diagnosis     OSEI III (cervical intraepithelial neoplasia III)     Anxiety and depression     Family history of deep venous thrombosis     H/O LEEP     History of marijuana use     Maternal mental disorder, antepartum     Poor social situation     Situational stress     Flank pain     Past Medical History:   Diagnosis Date     Abnormal uterine bleeding      Back pain     [place].  Has on going mid back pain     OSEI III (cervical intraepithelial neoplasia III) 01/03/2019 8/27/14 ASCUS 9/7/16 ASCUS 10/5/17 ASCUS 11/8/18 HSIL 11/27/18 Colpo ECC OSEI II, bx cervicitis 1/3/19 LEEP OSIE II-III completely excised, ECC neg 1/14/20 NIL pap, neg HPV 5/25/21 NIL pap, +HR HPV (not 16/18). Plan: colposcopy due before 8/25/21     Hx of abnormal cervical Pap smear      Trichomonas infection 01/2020    treated                        Again, thank you for allowing me to participate in the care of your patient.        Sincerely,        Milagro Diamond PA-C    Electronically signed

## 2025-03-20 NOTE — PATIENT INSTRUCTIONS
Proper skin care from [facility]:    -Eliminate harsh soaps as they strip the natural oils from the skin, often resulting in dry itchy skin ( i.e. Dial, Zest, Togolese Spring)  -Use mild soaps such as Cetaphil or Dove Sensitive Skin in the shower. You do not need to use soap on arms, legs, and trunk every time you shower unless visibly soiled.   -Avoid hot or cold showers.  -After showering, lightly dry off and apply moisturizing within 2-3 minutes. This will help trap moisture in the skin.   -Aggressive use of a moisturizer at least 1-2 times a day to the entire body (including -Vanicream, Cetaphil, Aquaphor or Cerave) and moisturize hands after every washing.  -We recommend using moisturizers that come in a tub that needs to be scooped out, not a pump. This has more of an oil base. It will hold moisture in your skin much better than a water base moisturizer. The above recommended are non-pore clogging.      Wear a sunscreen with at least SPF 30 on your face, ears, neck and V of the chest daily. Wear sunscreen on other areas of the body if those areas are exposed to the sun throughout the day. Sunscreens can contain physical and/or chemical blockers. Physical blockers are less likely to clog pores, these include zinc oxide and titanium dioxide. Reapply every two hour and after swimming.     Sunscreen examples: https://www.ewg.org/sunscreen/    UV radiation  UVA radiation remains constant throughout the day and throughout the year. It is a longer wavelength than UVB and therefore penetrates deeper into the skin leading to immediate and delayed tanning, photoaging, and skin cancer. 70-80% of UVA and UVB radiation occurs between the hours of 10am-2pm.  UVB radiation  UVB radiation causes the most harmful effects and is more significant during the summer months. However, snow and ice can reflect UVB radiation leading to skin damage during the winter months as well. UVB radiation is responsible for tanning,  burning, inflammation, delayed erythema (pinkness), pigmentation (brown spots), and skin cancer.     I recommend self monthly full body exams and yearly full body exams with a dermatology provider. If you develop a new or changing lesion please follow up for examination. Most skin cancers are pink and scaly or pink and pearly. However, we do see blue/brown/black skin cancers.  Consider the ABCDEs of melanoma when giving yourself your monthly full body exam ( don't forget the groin, buttocks, feet, toes, etc). A-asymmetry, B-borders, C-color, D-diameter, E-elevation or evolving. If you see any of these changes please follow up in clinic. If you cannot see your back I recommend purchasing a hand held mirror to use with a larger wall mirror.       Checking for Skin Cancer  You can find cancer early by checking your skin each month. There are 3 kinds of skin cancer. They are melanoma, basal cell carcinoma, and squamous cell carcinoma. Doing monthly skin checks is the best way to find new marks or skin changes. Follow the instructions below for checking your skin.   The ABCDEs of checking moles for melanoma   Check your moles or growths for signs of melanoma using ABCDE:   Asymmetry: the sides of the mole or growth don t match  Border: the edges are ragged, notched, or blurred  Color: the color within the mole or growth varies  Diameter: the mole or growth is larger than 6 mm (size of a pencil eraser)  Evolving: the size, shape, or color of the mole or growth is changing (evolving is not shown in the images below)    Checking for other types of skin cancer  Basal cell carcinoma or squamous cell carcinoma have symptoms such as:     A spot or mole that looks different from all other marks on your skin  Changes in how an area feels, such as itching, tenderness, or pain  Changes in the skin's surface, such as oozing, bleeding, or scaliness  A sore that does not heal  New swelling or redness beyond the border of a  mole    Who s at risk?  Anyone can get skin cancer. But you are at greater risk if you have:   Fair skin, light-colored hair, or light-colored eyes  Many moles or abnormal moles on your skin  A history of sunburns from sunlight or tanning beds  A family history of skin cancer  A history of exposure to radiation or chemicals  A weakened immune system  If you have had skin cancer in the past, you are at risk for recurring skin cancer.   How to check your skin  Do your monthly skin checkups in front of a full-length mirror. Check all parts of your body, including your:   Head (ears, face, neck, and scalp)  Torso (front, back, and sides)  Arms (tops, undersides, upper, and lower armpits)  Hands (palms, backs, and fingers, including under the nails)  Buttocks and genitals  Legs (front, back, and sides)  Feet (tops, soles, toes, including under the nails, and between toes)  If you have a lot of moles, take digital photos of them each month. Make sure to take photos both up close and from a distance. These can help you see if any moles change over time.   Most skin changes are not cancer. But if you see any changes in your skin, call your doctor right away. Only he or she can diagnose a problem. If you have skin cancer, seeing your doctor can be the first step toward getting the treatment that could save your life.   [employer] last reviewed this educational content on 4/1/2019 2000-2020 [employer]. [address] [place] [address], [place] [address]. All rights reserved. This information is not intended as a substitute for professional medical care. Always follow your healthcare professional's instructions.       When should I call my doctor?  If you are worsening or not improving, please, contact us or seek urgent care as noted below.     Who should I call with questions (adults)?    [facility] [phone]  For urgent needs outside of business hours call the [facility] at  [phone] and ask for the dermatology resident on call to be paged  If this is a medical emergency and you are unable to reach an ER, Call 911      If you need a prescription refill, please contact your pharmacy. Refills are approved or denied by our Physicians during normal business hours, Monday through Friday.  Per office policy, refills will not be granted if you have not been seen within the past year (or sooner depending on the condition).

## 2025-03-24 ENCOUNTER — TELEPHONE (OUTPATIENT)
Dept: OBGYN | Facility: CLINIC | Age: 30
End: 2025-03-24
Payer: COMMERCIAL

## 2025-03-24 DIAGNOSIS — Z30.9 CONTRACEPTION MANAGEMENT: Primary | ICD-10-CM

## 2025-03-24 DIAGNOSIS — Z30.015 ENCOUNTER FOR INITIAL PRESCRIPTION OF VAGINAL RING HORMONAL CONTRACEPTIVE: ICD-10-CM

## 2025-03-24 DIAGNOSIS — Z78.9 USES VAGINAL CONTRACEPTIVE RING: ICD-10-CM

## 2025-03-24 RX ORDER — ETONOGESTREL/ETHINYL ESTRADIOL .12-.015MG
RING, VAGINAL VAGINAL
Qty: 3 EACH | Refills: 0 | Status: SHIPPED | OUTPATIENT
Start: 2025-03-24 | End: 2025-03-25

## 2025-03-24 RX ORDER — ETONOGESTREL AND ETHINYL ESTRADIOL VAGINAL RING .015; .12 MG/D; MG/D
1 RING VAGINAL
Qty: 3 EACH | Refills: 3 | OUTPATIENT
Start: 2025-03-24

## 2025-03-24 NOTE — TELEPHONE ENCOUNTER
Prior Authorization Retail Medication Request - BRAND ONLY - RENEWAL   3/29/25    Medication/Dose: Medication request, call back   Diagnosis and ICD code (if different than what is on RX):  NUVARING 0.12-0.015 MG/24HR VA RING   New/renewal/insurance change PA/secondary ins. PA:  Previously Tried and Failed:  Office Visit 24  She states that she only has bleeding when it is time to change the Nuva Ring. She likes it and feels like her prior breakthrough bleeding has improved. Ok to continue on Nuva Ring. .  C/o dizziness with generic.      Insurance   Primary:   [employer] - Phone [phone] Fax [phone]     Pharmacy Information (if different than what is on RX)  Name:  [facility]/pharmacy #[id number] [place], [place] - [address]   PH: [phone]   FAX  [phone]     Clinic Information  Preferred routing pool for dept communication: [facility] PRIOR AUTH

## 2025-03-24 NOTE — TELEPHONE ENCOUNTER
Requested Prescriptions   Pending Prescriptions Disp Refills    etonogestrel-ethinyl estradiol (NUVARING) 0.12-0.015 MG/24HR vaginal ring 3 each 3     Sig: Place 1 each vaginally every 28 days.       Contraceptives Protocol Failed - 3/24/2025  9:33 AM        Failed - Medication indicated for associated diagnosis     Medication is associated with one or more of the following diagnoses:  Contraception  Acne  Dysmenorrhea  Menorrhagia  Amenorrhea  PCOS  Premenstrual Dysphoric Disorder  Irregular menses  Endometriosis  Contraceptive counseling  Finding of menstrual bleeding  Education about oral contraception  Uses contraception  Initial prescription of oral contraception  Oral contraception-no problem  Oral contraceptive repeat          Passed - Patient is not a current smoker if age is 35 or older        Passed - Medication is active on med list and the sig matches. RN to manually verify dose and sig if red X/fail.     If the protocol passes (green check), you do not need to verify med dose and sig.    A prescription matches if they are the same clinical intention.    For Example: once daily and every morning are the same.    The protocol can not identify upper and lower case letters as matching and will fail.     For Example: Take 1 tablet (50 mg) by mouth daily     TAKE 1 TABLET (50 MG) BY MOUTH DAILY    For all fails (red x), verify dose and sig.    If the refill does match what is on file, the RN can still proceed to approve the refill request.       If they do not match, route to the appropriate provider.             Passed - Recent (12 mo) or future (90 days) visit within the authorizing provider's specialty     The patient must have completed an in-person or virtual visit within the past 12 months or has a future visit scheduled within the next 90 days with the authorizing provider s specialty.  Urgent care and e-visits do not qualify as an office visit for this protocol.          Passed - No active pregnancy  on record        Passed - No positive pregnancy test in past 12 months           Last Written Prescription Date:  3/21/24  Last Fill Quantity: 3,  # refills: 3   Last office visit: 3/21/2024 ; last virtual visit: Visit date not found with prescribing provider:  Rayna Noel   Future Office Visit:      Refill available  Rochelle Kumar RN on 3/24/2025 at 10:31 AM

## 2025-03-24 NOTE — TELEPHONE ENCOUNTER
[facility] Call Center    Phone Message    May a detailed message be left on voicemail: yes     Reason for Call: Medication Question or concern regarding medication   Prescription Clarification  Name of Medication:   etonogestrel-ethinyl estradiol (NUVARING) 0.12-0.015 MG/24HR vaginal ring       Prescribing Provider: Prior DR Noel, now meeting Dr Gee   Pharmacy: -- changing to [facility] potentially will inform RN upon call back   What on the order needs clarification? Pt would like to speak with care team regarding current refill of above med. Pt states she is being given the incorrect type of birth control at [facility] and looking for clarification and possible new rx for the correct med. Please review  and contact pt as soon as able.       Action Taken: Other: [facility]    Travel Screening: Not Applicable     Date of Service:

## 2025-03-25 ENCOUNTER — OFFICE VISIT (OUTPATIENT)
Dept: OBGYN | Facility: CLINIC | Age: 30
End: 2025-03-25
Payer: COMMERCIAL

## 2025-03-25 VITALS
DIASTOLIC BLOOD PRESSURE: 72 MMHG | BODY MASS INDEX: 25.94 KG/M2 | HEIGHT: 66 IN | SYSTOLIC BLOOD PRESSURE: 116 MMHG | WEIGHT: 161.4 LBS

## 2025-03-25 DIAGNOSIS — Z97.5 BREAKTHROUGH BLEEDING WITH NUVARING: Primary | ICD-10-CM

## 2025-03-25 DIAGNOSIS — Z78.9 USES VAGINAL CONTRACEPTIVE RING: ICD-10-CM

## 2025-03-25 DIAGNOSIS — N92.1 BREAKTHROUGH BLEEDING WITH NUVARING: Primary | ICD-10-CM

## 2025-03-25 PROCEDURE — 3074F SYST BP LT 130 MM HG: CPT | Performed by: STUDENT IN AN ORGANIZED HEALTH CARE EDUCATION/TRAINING PROGRAM

## 2025-03-25 PROCEDURE — 99214 OFFICE O/P EST MOD 30 MIN: CPT | Performed by: STUDENT IN AN ORGANIZED HEALTH CARE EDUCATION/TRAINING PROGRAM

## 2025-03-25 PROCEDURE — 3078F DIAST BP <80 MM HG: CPT | Performed by: STUDENT IN AN ORGANIZED HEALTH CARE EDUCATION/TRAINING PROGRAM

## 2025-03-25 RX ORDER — ETONOGESTREL/ETHINYL ESTRADIOL .12-.015MG
RING, VAGINAL VAGINAL
Qty: 3 EACH | Refills: 4 | Status: SHIPPED | OUTPATIENT
Start: 2025-03-25

## 2025-03-25 NOTE — TELEPHONE ENCOUNTER
Prior Authorization Approval    Medication: NUVARING 0.12-0.015 MG/24HR VA RING  Authorization Effective Date: 2/23/2025  Authorization Expiration Date: 3/25/2026  Approved Dose/Quantity:   Reference #:     Insurance Company: [employer] - Phone [phone] Fax [phone]  Expected CoPay: $    CoPay Card Available:      Financial Assistance Needed:   Which Pharmacy is filling the prescription: [id number] [address]  Pharmacy Notified: YES  Patient Notified: **Instructed pharmacy to notify patient when script is ready to /ship.**

## 2025-03-25 NOTE — TELEPHONE ENCOUNTER
PA Initiation    Medication: NUVARING 0.12-0.015 MG/24HR [facility]  Insurance Company: [employer] - Phone [phone] Fax [phone]  Pharmacy Filling the Rx: [facility][id number] [place], [place] - [address] [facility] [address]  Filling Pharmacy Phone: [phone]  Filling Pharmacy Fax: [phone]  Start Date: 3/25/2025

## 2025-03-27 RX ORDER — GLYCOPYRROLATE 2 MG/1
TABLET ORAL
Qty: 180 TABLET | OUTPATIENT
Start: 2025-03-27

## 2025-03-27 NOTE — TELEPHONE ENCOUNTER
glycopyrrolate (GLYCATE) 2 MG tablet 90 tablet 2 3/20/2025   Addressed in a different encounter.

## 2025-05-10 ENCOUNTER — HEALTH MAINTENANCE LETTER (OUTPATIENT)
Age: 30
End: 2025-05-10

## 2025-06-26 ENCOUNTER — OFFICE VISIT (OUTPATIENT)
Dept: DERMATOLOGY | Facility: CLINIC | Age: 30
End: 2025-06-26
Payer: COMMERCIAL

## 2025-06-26 DIAGNOSIS — L74.519 HYPERHIDROSIS, FOCAL, PRIMARY: Primary | ICD-10-CM

## 2025-06-26 DIAGNOSIS — R61 GENERALIZED HYPERHIDROSIS: ICD-10-CM

## 2025-06-26 NOTE — PATIENT INSTRUCTIONS
Proper skin care from [facility]:    -Eliminate harsh soaps as they strip the natural oils from the skin, often resulting in dry itchy skin ( i.e. Dial, Zest, American Spring)  -Use mild soaps such as Cetaphil or Dove Sensitive Skin in the shower. You do not need to use soap on arms, legs, and trunk every time you shower unless visibly soiled.   -Avoid hot or cold showers.  -After showering, lightly dry off and apply moisturizing within 2-3 minutes. This will help trap moisture in the skin.   -Aggressive use of a moisturizer at least 1-2 times a day to the entire body (including -Vanicream, Cetaphil, Aquaphor or Cerave) and moisturize hands after every washing.  -We recommend using moisturizers that come in a tub that needs to be scooped out, not a pump. This has more of an oil base. It will hold moisture in your skin much better than a water base moisturizer. The above recommended are non-pore clogging.      Wear a sunscreen with at least SPF 30 on your face, ears, neck and V of the chest daily. Wear sunscreen on other areas of the body if those areas are exposed to the sun throughout the day. Sunscreens can contain physical and/or chemical blockers. Physical blockers are less likely to clog pores, these include zinc oxide and titanium dioxide. Reapply every two hour and after swimming.     Sunscreen examples: https://www.ewg.org/sunscreen/    UV radiation  UVA radiation remains constant throughout the day and throughout the year. It is a longer wavelength than UVB and therefore penetrates deeper into the skin leading to immediate and delayed tanning, photoaging, and skin cancer. 70-80% of UVA and UVB radiation occurs between the hours of 10am-2pm.  UVB radiation  UVB radiation causes the most harmful effects and is more significant during the summer months. However, snow and ice can reflect UVB radiation leading to skin damage during the winter months as well. UVB radiation is responsible for tanning,  burning, inflammation, delayed erythema (pinkness), pigmentation (brown spots), and skin cancer.     I recommend self monthly full body exams and yearly full body exams with a dermatology provider. If you develop a new or changing lesion please follow up for examination. Most skin cancers are pink and scaly or pink and pearly. However, we do see blue/brown/black skin cancers.  Consider the ABCDEs of melanoma when giving yourself your monthly full body exam ( don't forget the groin, buttocks, feet, toes, etc). A-asymmetry, B-borders, C-color, D-diameter, E-elevation or evolving. If you see any of these changes please follow up in clinic. If you cannot see your back I recommend purchasing a hand held mirror to use with a larger wall mirror.       Checking for Skin Cancer  You can find cancer early by checking your skin each month. There are 3 kinds of skin cancer. They are melanoma, basal cell carcinoma, and squamous cell carcinoma. Doing monthly skin checks is the best way to find new marks or skin changes. Follow the instructions below for checking your skin.   The ABCDEs of checking moles for melanoma   Check your moles or growths for signs of melanoma using ABCDE:   Asymmetry: the sides of the mole or growth don t match  Border: the edges are ragged, notched, or blurred  Color: the color within the mole or growth varies  Diameter: the mole or growth is larger than 6 mm (size of a pencil eraser)  Evolving: the size, shape, or color of the mole or growth is changing (evolving is not shown in the images below)    Checking for other types of skin cancer  Basal cell carcinoma or squamous cell carcinoma have symptoms such as:     A spot or mole that looks different from all other marks on your skin  Changes in how an area feels, such as itching, tenderness, or pain  Changes in the skin's surface, such as oozing, bleeding, or scaliness  A sore that does not heal  New swelling or redness beyond the border of a  mole    Who s at risk?  Anyone can get skin cancer. But you are at greater risk if you have:   Fair skin, light-colored hair, or light-colored eyes  Many moles or abnormal moles on your skin  A history of sunburns from sunlight or tanning beds  A family history of skin cancer  A history of exposure to radiation or chemicals  A weakened immune system  If you have had skin cancer in the past, you are at risk for recurring skin cancer.   How to check your skin  Do your monthly skin checkups in front of a full-length mirror. Check all parts of your body, including your:   Head (ears, face, neck, and scalp)  Torso (front, back, and sides)  Arms (tops, undersides, upper, and lower armpits)  Hands (palms, backs, and fingers, including under the nails)  Buttocks and genitals  Legs (front, back, and sides)  Feet (tops, soles, toes, including under the nails, and between toes)  If you have a lot of moles, take digital photos of them each month. Make sure to take photos both up close and from a distance. These can help you see if any moles change over time.   Most skin changes are not cancer. But if you see any changes in your skin, call your doctor right away. Only he or she can diagnose a problem. If you have skin cancer, seeing your doctor can be the first step toward getting the treatment that could save your life.   [employer] last reviewed this educational content on 4/1/2019 2000-2020 [employer]. [address] [place] [address], [place] [address]. All rights reserved. This information is not intended as a substitute for professional medical care. Always follow your healthcare professional's instructions.       When should I call my doctor?  If you are worsening or not improving, please, contact us or seek urgent care as noted below.     Who should I call with questions (adults)?    [facility] [phone]  For urgent needs outside of business hours call the [facility] at  [phone] and ask for the dermatology resident on call to be paged  If this is a medical emergency and you are unable to reach an ER, Call 911      If you need a prescription refill, please contact your pharmacy. Refills are approved or denied by our Physicians during normal business hours, Monday through Friday.  Per office policy, refills will not be granted if you have not been seen within the past year (or sooner depending on the condition).

## 2025-06-26 NOTE — PROGRESS NOTES
[facility] Dermatology Note  Encounter Date: Jun 26, 2025  Office Visit      Dermatology Problem List:  1. Hyperhidrosis - generalized and focal  - referral for neuro placed - patient would like to have sympathectomy  - past: Glycopyrrolate 2mg, clinical strength Deoderant, Lume, oxybutynin 5mg, glycopyrrolate 1mg  ____________________________________________    Assessment & Plan:  # Generalized and Primary Focal Hyperhidrosis - axillae, hands/palms, scalp,face, chest/breasts. Does not sweat much from the waist down. Patient struggles with remembering to take medication.   - edu that systemic treatments likely will have a better effect on her than procedural type treatments such as neurotoxin (botox) and sympathectomy - given that her sweating is more generalized rather than focal  - can continue lume and/or try certain dri as well as Robinul up to 3 mg daily - she did not feel any of these were helpful so she discontinued them after 1-2 mo. Never tried certain dri.  - discussed option of miradry - would need to see outside derm clinic for this  - patient requests referral for sympathectomy - neuro referral placed  - Follow up prn    Procedures Performed:   None     Follow-up: 3 month(s) in-person, or earlier for new or changing lesions    Staff and scribe:     Scribe Disclosure:   I, TERRA SORENSON, am serving as a scribe; to document services personally performed by Milagro Diamond PA-C -based on data collection and the provider's statements to me.     Provider Disclosure:  I agree with above History, Review of Systems, Physical exam and Plan.  I have reviewed the content of the documentation and have edited it as needed. I have personally performed the services documented here and the documentation accurately represents those services and the decisions I have made.      Electronically signed by:      All risks, benefits and alternatives were discussed with patient.  Patient is in agreement  and understands the assessment and plan.  All questions were answered.    Milagro Diamond PA-C, MPAS  [facility]: Phone: [phone], Fax: [phone]  [facility]: Phone: [phone],  Fax: [phone]  [facility]: Phone: [phone], Fax: [phone]  ____________________________________________    CC: RECHECK (f/u Hyperhidrosis - patient reports the same. )      Reviewed patients past medical history and pertinent chart review prior to patient's visit today.     HPI:  Ms. Hansa Ortiz is a 29 year old female who presents today as a return patient for for a hyperhidrosis. She reports that she wouldlike to discuss procedures for this condition,. She tried robinul, up to 2mg daily and it was not helpful. She also notes though that she struggles to remember to take medications. She notes Lumenot helpful. Would like to talk about botox and surgery.     Reported that she went up to 2 mg of glycopyrrolate.     Patient is otherwise feeling well, without additional concerns.    Labs:  N/A    Physical Exam:  Vitals: There were no vitals taken for this visit.  SKIN: Sun-exposed skin, which includes the head/face, neck, both arms, digits, and/or nails was examined.    - no active sweating noticed. No lesions of concern.    - No other lesions of concern on areas examined.     Medications:  Current Outpatient Medications   Medication Sig Dispense Refill    Dermatological Products, Misc. (CERTAIN DRI EVERYDAY STRENGTH) 20 % STCK Externally apply 1 Application topically at bedtime. 74 g 11    acetaminophen (TYLENOL) 325 MG tablet Take 2 tablets (650 mg) by mouth every 4 hours as needed for mild pain 60 tablet 0    cyclobenzaprine (FLEXERIL) 10 MG tablet Take 10 mg by mouth      docusate sodium (COLACE) 100 MG tablet Take 1 tablet (100 mg) by mouth daily 60 tablet 1    glycopyrrolate (GLYCATE) 2 MG tablet Start 1  tablet PO once daily. Then increase dose gradually to max dose of 3 mg daily (1.5 tablets). (Patient not taking: Reported on 6/26/2025) 90 tablet 2    ibuprofen (ADVIL/MOTRIN) 600 MG tablet Take 1 tablet (600 mg) by mouth every 6 hours as needed for moderate pain 30 tablet 0    NUVARING 0.12-0.015 MG/24HR vaginal ring Insert one (1) ring vaginally and leave in place for 3 consecutive weeks (21 days), then remove for 1 week. 3 each 4    ondansetron (ZOFRAN ODT) 4 MG ODT tab Take 1 tablet (4 mg) by mouth every 8 hours as needed for nausea. 15 tablet 0     No current facility-administered medications for this visit.      Past Medical/Surgical History:   Patient Active Problem List   Diagnosis    OSEI III (cervical intraepithelial neoplasia III)    Anxiety and depression    Family history of deep venous thrombosis    H/O LEEP    History of marijuana use    Maternal mental disorder, antepartum    Poor social situation    Situational stress    Flank pain     Past Medical History:   Diagnosis Date    Abnormal uterine bleeding     Back pain     [place] lot [place].  Has on going mid back pain    OSEI III (cervical intraepithelial neoplasia III) 01/03/2019 8/27/14 ASCUS 9/7/16 ASCUS 10/5/17 ASCUS 11/8/18 HSIL 11/27/18 Colpo ECC OSEI II, bx cervicitis 1/3/19 LEEP OSEI II-III completely excised, ECC neg 1/14/20 NIL pap, neg HPV 5/25/21 NIL pap, +HR HPV (not 16/18). Plan: colposcopy due before 8/25/21    Hx of abnormal cervical Pap smear     Trichomonas infection 01/2020    treated

## 2025-06-26 NOTE — LETTER
6/26/2025      Hansa Ortiz  [address]  [place] [address]      Dear Colleague,    Thank you for referring your patient, Hansa Ortiz, to the [facility]. Please see a copy of my visit note below.    [facility] Dermatology Note  Encounter Date: Jun 26, 2025  Office Visit      Dermatology Problem List:  1. Hyperhidrosis - generalized and focal  - referral for neuro placed - patient would like to have sympathectomy  - past: Glycopyrrolate 2mg, clinical strength Deoderant, Lume, oxybutynin 5mg, glycopyrrolate 1mg  ____________________________________________    Assessment & Plan:  # Generalized and Primary Focal Hyperhidrosis - axillae, hands/palms, scalp,face, chest/breasts. Does not sweat much from the waist down. Patient struggles with remembering to take medication.   - edu that systemic treatments likely will have a better effect on her than procedural type treatments such as neurotoxin (botox) and sympathectomy - given that her sweating is more generalized rather than focal  - can continue lume and/or try certain dri as well as Robinul up to 3 mg daily - she did not feel any of these were helpful so she discontinued them after 1-2 mo. Never tried certain dri.  - discussed option of miradry - would need to see outside derm clinic for this  - patient requests referral for sympathectomy - neuro referral placed  - Follow up prn    Procedures Performed:   None     Follow-up: 3 month(s) in-person, or earlier for new or changing lesions    Staff and scribe:     Scribe Disclosure:   I, TERRA SORENSON, am serving as a scribe; to document services personally performed by Milagro Diamond PA-C -based on data collection and the provider's statements to me.     Provider Disclosure:  I agree with above History, Review of Systems, Physical exam and Plan.  I have reviewed the content of the documentation and have edited it as needed. I have personally  performed the services documented here and the documentation accurately represents those services and the decisions I have made.      Electronically signed by:      All risks, benefits and alternatives were discussed with patient.  Patient is in agreement and understands the assessment and plan.  All questions were answered.    Milagro Diamond PA-C, MPAS  [facility]: Phone: [phone], Fax: [phone]  [facility]: Phone: [phone],  Fax: [phone]  [facility]: Phone: [phone], Fax: [phone]  ____________________________________________    CC: RECHECK (f/u Hyperhidrosis - patient reports the same. )      Reviewed patients past medical history and pertinent chart review prior to patient's visit today.     HPI:  Ms. Hansa Ortiz is a 29 year old female who presents today as a return patient for for a hyperhidrosis. She reports that she wouldlike to discuss procedures for this condition,. She tried robinul, up to 2mg daily and it was not helpful. She also notes though that she struggles to remember to take medications. She notes Lumenot helpful. Would like to talk about botox and surgery.     Reported that she went up to 2 mg of glycopyrrolate.     Patient is otherwise feeling well, without additional concerns.    Labs:  N/A    Physical Exam:  Vitals: There were no vitals taken for this visit.  SKIN: Sun-exposed skin, which includes the head/face, neck, both arms, digits, and/or nails was examined.    - no active sweating noticed. No lesions of concern.    - No other lesions of concern on areas examined.     Medications:  Current Outpatient Medications   Medication Sig Dispense Refill     Dermatological Products, Misc. (CERTAIN DRI EVERYDAY STRENGTH) 20 % STCK Externally apply 1 Application topically at bedtime. 74 g 11     acetaminophen (TYLENOL) 325 MG tablet Take 2 tablets (650 mg) by  mouth every 4 hours as needed for mild pain 60 tablet 0     cyclobenzaprine (FLEXERIL) 10 MG tablet Take 10 mg by mouth       docusate sodium (COLACE) 100 MG tablet Take 1 tablet (100 mg) by mouth daily 60 tablet 1     glycopyrrolate (GLYCATE) 2 MG tablet Start 1 tablet PO once daily. Then increase dose gradually to max dose of 3 mg daily (1.5 tablets). (Patient not taking: Reported on 6/26/2025) 90 tablet 2     ibuprofen (ADVIL/MOTRIN) 600 MG tablet Take 1 tablet (600 mg) by mouth every 6 hours as needed for moderate pain 30 tablet 0     NUVARING 0.12-0.015 MG/24HR vaginal ring Insert one (1) ring vaginally and leave in place for 3 consecutive weeks (21 days), then remove for 1 week. 3 each 4     ondansetron (ZOFRAN ODT) 4 MG ODT tab Take 1 tablet (4 mg) by mouth every 8 hours as needed for nausea. 15 tablet 0     No current facility-administered medications for this visit.      Past Medical/Surgical History:   Patient Active Problem List   Diagnosis     OSEI III (cervical intraepithelial neoplasia III)     Anxiety and depression     Family history of deep venous thrombosis     H/O LEEP     History of marijuana use     Maternal mental disorder, antepartum     Poor social situation     Situational stress     Flank pain     Past Medical History:   Diagnosis Date     Abnormal uterine bleeding      Back pain     [place].  Has on going mid back pain     OSEI III (cervical intraepithelial neoplasia III) 01/03/2019 8/27/14 ASCUS 9/7/16 ASCUS 10/5/17 ASCUS 11/8/18 HSIL 11/27/18 Colpo ECC OSEI II, bx cervicitis 1/3/19 LEEP OSEI II-III completely excised, ECC neg 1/14/20 NIL pap, neg HPV 5/25/21 NIL pap, +HR HPV (not 16/18). Plan: colposcopy due before 8/25/21     Hx of abnormal cervical Pap smear      Trichomonas infection 01/2020    treated                        Again, thank you for allowing me to participate in the care of your patient.        Sincerely,        Milagro Diamond,  AKBAR    Electronically signed

## 2025-06-30 ENCOUNTER — PATIENT OUTREACH (OUTPATIENT)
Dept: CARE COORDINATION | Facility: CLINIC | Age: 30
End: 2025-06-30
Payer: COMMERCIAL

## 2025-07-02 DIAGNOSIS — Z78.9 USES VAGINAL CONTRACEPTIVE RING: ICD-10-CM

## 2025-07-02 RX ORDER — ETONOGESTREL/ETHINYL ESTRADIOL .12-.015MG
RING, VAGINAL VAGINAL
Qty: 3 EACH | Refills: 3 | Status: SHIPPED | OUTPATIENT
Start: 2025-07-02

## 2025-07-02 NOTE — TELEPHONE ENCOUNTER
Requested Prescriptions   Pending Prescriptions Disp Refills    NUVARING 0.12-0.015 MG/24HR vaginal ring 3 each 4     Sig: Insert one (1) ring vaginally and leave in place for 3 consecutive weeks (21 days), then remove for 1 week.       Contraceptives Protocol Passed - 7/2/2025  7:56 AM        Passed - Patient is not a current smoker if age is 35 or older        Passed - Medication is active on med list and the sig matches. RN to manually verify dose and sig if red X/fail.     If the protocol passes (green check), you do not need to verify med dose and sig.    A prescription matches if they are the same clinical intention.    For Example: once daily and every morning are the same.    The protocol can not identify upper and lower case letters as matching and will fail.     For Example: Take 1 tablet (50 mg) by mouth daily     TAKE 1 TABLET (50 MG) BY MOUTH DAILY    For all fails (red x), verify dose and sig.    If the refill does match what is on file, the RN can still proceed to approve the refill request.       If they do not match, route to the appropriate provider.             Passed - Recent (12 month) or future (90 days) visit with authorizing provider's specialty (provided they have been seen in the past 15 months)     The patient must have completed an in-person or virtual visit within the past 12 months or has a future visit scheduled within the next 90 days with the authorizing provider s specialty.  Urgent care and e-visits do not qualify as an office visit for this protocol.          Passed - Medication indicated for associated diagnosis     Medication is associated with one or more of the following diagnoses:  Contraception  Acne  Dysmenorrhea  Menorrhagia  Amenorrhea  PCOS  Premenstrual Dysphoric Disorder  Irregular menses  Endometriosis  Contraceptive counseling  Finding of menstrual bleeding  Education about oral contraception  Uses contraception  Initial prescription of oral contraception  Oral  contraception-no problem  Oral contraceptive repeat          Passed - No active pregnancy on record        Passed - No positive pregnancy test in past 12 months            Last Written Prescription Date:  3/25/2025  Last Fill Quantity: 3,  # refills: 4   Last office visit: 3/25/2025 Visit date  found with prescribing provider:  Jennifer Gee   Future Office Visit:  none scheduled

## 2025-07-02 NOTE — TELEPHONE ENCOUNTER
Pt requesting pharmacy change    Rx approved    Lillie Lancaster RN on 7/2/2025 at 8:40 AM  [facility] Triage

## (undated) DEVICE — PACK TVT HYSTEROSCOPY SMA15HYFSE

## (undated) DEVICE — UROSEAL ADJUSTABLE ENDOSCOPIC VALVE

## (undated) DEVICE — SHEATH URETERAL ACCESS NAVIGATOR HD 11/13FRX36CM M0062502220

## (undated) DEVICE — TUBING IRRIG TUR Y TYPE 96" LF 6543-01

## (undated) DEVICE — DRSG TELFA 2X3"

## (undated) DEVICE — SOL NACL 0.9% IRRIG 3000ML BAG 2B7477

## (undated) DEVICE — RAD RX ISOVUE 300 (50ML) 61% IOPAMIDOL CHARGE PER ML

## (undated) DEVICE — SOL WATER IRRIG 1000ML BOTTLE 2F7114

## (undated) DEVICE — LINEN FULL SHEET 5511

## (undated) DEVICE — GUIDEWIRE SENSOR DUAL FLEX STR 0.035"X150CM M0066703080

## (undated) DEVICE — CATH INTERMITTENT CLEAN-CATH FEMALE 14FR 6" VINYL LF 420614

## (undated) DEVICE — PUMP SYSTEM SINGLE ACTION M0067201000

## (undated) DEVICE — DEVICE TISSUE REMOVAL HYSTEROSCOPIC MYOSURE LITE 30-401LITE

## (undated) DEVICE — GLOVE BIOGEL PI MICRO SZ 7.5 48575

## (undated) DEVICE — DECANTER BAG 2002S

## (undated) DEVICE — PACK CYSTO CUSTOM RIDGES

## (undated) DEVICE — Device

## (undated) DEVICE — GLOVE PROTEXIS MICRO 7.0  2D73PM70

## (undated) DEVICE — KIT PROCEDURE FLUENT IN/OUT FLOWPAK TISS TRAP FLT-112S

## (undated) DEVICE — SEAL SET MYOSURE ROD LENS SCOPE SINGLE USE 40-902

## (undated) DEVICE — LINEN HALF SHEET 5512

## (undated) DEVICE — SOL NACL 0.9% IRRIG 1000ML BOTTLE 2F7124

## (undated) DEVICE — WIRE GUIDE AMPLATZ SUPER STIFF 0.035"X145CM 46-524

## (undated) DEVICE — LINEN TOWEL PACK X5 5464

## (undated) DEVICE — GLOVE PROTEXIS BLUE W/NEU-THERA 7.0  2D73EB70

## (undated) DEVICE — FIBER LASER 200 UM DISPOSABLE TFL-FBX200S

## (undated) DEVICE — BAG CLEAR TRASH 1.3M 39X33" P4040C

## (undated) DEVICE — CATH TRAY FOLEY SURESTEP 16FR DRAIN BAG STATOCK A899916

## (undated) DEVICE — BASKET NITINOL TIPLESS HALO  1.5FRX120CM 554120

## (undated) DEVICE — CATH URETERAL FLEX TIP TIGERTAIL 06FRX70CM 139006

## (undated) RX ORDER — SCOLOPAMINE TRANSDERMAL SYSTEM 1 MG/1
PATCH, EXTENDED RELEASE TRANSDERMAL
Status: DISPENSED
Start: 2023-12-02

## (undated) RX ORDER — FENTANYL CITRATE 50 UG/ML
INJECTION, SOLUTION INTRAMUSCULAR; INTRAVENOUS
Status: DISPENSED
Start: 2022-09-07

## (undated) RX ORDER — FENTANYL CITRATE 0.05 MG/ML
INJECTION, SOLUTION INTRAMUSCULAR; INTRAVENOUS
Status: DISPENSED
Start: 2022-09-07

## (undated) RX ORDER — LIDOCAINE HYDROCHLORIDE 10 MG/ML
INJECTION, SOLUTION EPIDURAL; INFILTRATION; INTRACAUDAL; PERINEURAL
Status: DISPENSED
Start: 2023-12-02

## (undated) RX ORDER — BUPIVACAINE HYDROCHLORIDE 5 MG/ML
INJECTION, SOLUTION PERINEURAL
Status: DISPENSED
Start: 2022-06-07

## (undated) RX ORDER — OXYCODONE HYDROCHLORIDE 5 MG/1
TABLET ORAL
Status: DISPENSED
Start: 2022-09-07

## (undated) RX ORDER — PROPOFOL 10 MG/ML
INJECTION, EMULSION INTRAVENOUS
Status: DISPENSED
Start: 2023-12-02

## (undated) RX ORDER — FUROSEMIDE 10 MG/ML
INJECTION INTRAMUSCULAR; INTRAVENOUS
Status: DISPENSED
Start: 2023-12-02

## (undated) RX ORDER — CEFAZOLIN SODIUM 1 G/3ML
INJECTION, POWDER, FOR SOLUTION INTRAMUSCULAR; INTRAVENOUS
Status: DISPENSED
Start: 2022-09-07

## (undated) RX ORDER — ONDANSETRON 2 MG/ML
INJECTION INTRAMUSCULAR; INTRAVENOUS
Status: DISPENSED
Start: 2022-09-07

## (undated) RX ORDER — KETOROLAC TROMETHAMINE 30 MG/ML
INJECTION, SOLUTION INTRAMUSCULAR; INTRAVENOUS
Status: DISPENSED
Start: 2023-12-02

## (undated) RX ORDER — FENTANYL CITRATE 50 UG/ML
INJECTION, SOLUTION INTRAMUSCULAR; INTRAVENOUS
Status: DISPENSED
Start: 2023-12-02

## (undated) RX ORDER — ACETAMINOPHEN 325 MG/1
TABLET ORAL
Status: DISPENSED
Start: 2022-09-07